# Patient Record
Sex: FEMALE | Race: WHITE | NOT HISPANIC OR LATINO | Employment: PART TIME | URBAN - METROPOLITAN AREA
[De-identification: names, ages, dates, MRNs, and addresses within clinical notes are randomized per-mention and may not be internally consistent; named-entity substitution may affect disease eponyms.]

---

## 2018-03-06 ENCOUNTER — OFFICE VISIT (OUTPATIENT)
Dept: DERMATOLOGY | Facility: IMAGING CENTER | Age: 43
End: 2018-03-06
Payer: COMMERCIAL

## 2018-03-06 VITALS — TEMPERATURE: 97.9 F | HEIGHT: 63 IN | BODY MASS INDEX: 23.92 KG/M2 | WEIGHT: 135 LBS

## 2018-03-06 DIAGNOSIS — L21.9 SEBORRHEIC DERMATITIS: ICD-10-CM

## 2018-03-06 DIAGNOSIS — L70.0 ACNE VULGARIS: ICD-10-CM

## 2018-03-06 PROCEDURE — 99203 OFFICE O/P NEW LOW 30 MIN: CPT | Performed by: DERMATOLOGY

## 2018-03-06 RX ORDER — SPIRONOLACTONE 50 MG/1
TABLET, FILM COATED ORAL
Qty: 180 TAB | Refills: 1 | Status: SHIPPED | OUTPATIENT
Start: 2018-03-06 | End: 2018-09-09

## 2018-03-06 RX ORDER — TAZAROTENE 1 MG/G
CREAM TOPICAL NIGHTLY
COMMUNITY
End: 2019-06-06

## 2018-03-06 ASSESSMENT — ENCOUNTER SYMPTOMS
FEVER: 0
CHILLS: 0

## 2018-03-06 NOTE — PROGRESS NOTES
Dermatology New Patient Visit    Chief Complaint   Patient presents with   • Acne       Subjective:     HPI:   Evelyn Mullins is a 43 y.o. female presenting for    Acne   Has been an issue on and off since youth  Just finished 1 month of doxycycline, so face is doing very well right now, but states has been on multiple courses of antibiotics since teen years  Has flare-ups ~ every other week  Does not flare with menstrual cycles   Prior treatments: doxycycline , Tazorac , clindamycin cream, accutane, light therapy  No family h/o breast/gynecologic cancers    Notes rash around the nose  Red, itchy  Started summer 2017  No treatments -- seemed to improve on its own a month or so ago    History of skin cancer: No  History of biopsies:No  History of blistering/severe sunburns:Yes, Details:  Teenager   Family history of skin cancer:No  Family history of atypical moles:No        No past medical history on file.    Current Outpatient Prescriptions on File Prior to Visit   Medication Sig Dispense Refill   • Cetirizine HCl (ZYRTEC ALLERGY PO) Take  by mouth.     • levothyroxine (SYNTHROID) 150 MCG Tab Take 150 mcg by mouth Every morning on an empty stomach.     • clindamycin (CLEOCIN T) 1 % Gel Apply  to affected area(s) 2 times a day.       No current facility-administered medications on file prior to visit.        Allergies   Allergen Reactions   • Pcn [Penicillins] Anaphylaxis       No family history on file.    Social History     Social History   • Marital status: Single     Spouse name: N/A   • Number of children: N/A   • Years of education: N/A     Occupational History   • Not on file.     Social History Main Topics   • Smoking status: Not on file   • Smokeless tobacco: Not on file   • Alcohol use Not on file   • Drug use: Unknown   • Sexual activity: Not on file     Other Topics Concern   • Not on file     Social History Narrative   • No narrative on file       Review of Systems   Constitutional: Negative for chills  "and fever.   Skin: Negative for itching and rash.   All other systems reviewed and are negative.       Objective:     A focused cutaneous exam was completed including: scalp, hair, ears, face, eyelids, conjunctiva, lips, neck, upper chest with the following pertinent findings listed below. Remaining above-listed examined areas within normal limits / negative for rashes or lesions.    Temperature 36.6 °C (97.9 °F), height 1.6 m (5' 3\"), weight 61.2 kg (135 lb).    Physical Exam   Constitutional: She is oriented to person, place, and time and well-developed, well-nourished, and in no distress.   HENT:   Head: Normocephalic and atraumatic.       Eyes: Conjunctivae and lids are normal.   Neck: Normal range of motion.   Pulmonary/Chest: Effort normal.   Neurological: She is alert and oriented to person, place, and time.   Skin: Skin is warm and dry.   Psychiatric: Mood and affect normal.   Vitals reviewed.      DATA: none applicable to review    Assessment and Plan:     1. Acne vulgaris, inflammatory , moderate  - educated patient about diagnosis, management options, and expectations of treatment  - given patient has been on multiple different antibiotics, light therapy, as well as accuane during youth, we discussed the option of spironolactone for treatment, which patient would like to try  - Start spironolactone 50mg qhs for 2 weeks; if tolerating well w/o side effects, increase dose to 100mg qhs   Discussed side effects, possible risks, that it is not FDA approved for acne treatment. Patient needs to be on birth control if taking this medication (patient's partner has had a vasectomy -- I told her if partner ever changes, she needs to inform me). She is aware of the teratogenicity risk with this medication. Discussed the reported associated inc risk of gynecologic CA in the literature, black box warning of increased risk of tumor growth. Increased breast tenderness, irregular menstrual bleeding, dizziness, theoretical " increase in K+, agranulocytosis, discussed    2. Seborrheic dermatitis  - educated patient about diagnosis, management options, and expectations of treatment  - instructed to start hydrocortisone 1% cream twice daily to the affected area on the face prn  - side effects of topical steroids discussed, including skin thinning, appearance of stretch marks (striae), easy bruising, telangiectasias, and possible increased hair growth   - instructed to start head & shoulders shampoo (or other pyrithione zinc vs selenium sulfide containing shampoo) 2-3 times/week; to leave on the scalp for at least 5-10 minutes prior to rinsing.    Followup: Return in about 3 months (around 6/6/2018) for f/u acne.    Amirah Rodriguez M.D.

## 2018-06-12 ENCOUNTER — HOSPITAL ENCOUNTER (OUTPATIENT)
Dept: LAB | Facility: MEDICAL CENTER | Age: 43
End: 2018-06-12
Attending: DERMATOLOGY
Payer: COMMERCIAL

## 2018-06-12 ENCOUNTER — OFFICE VISIT (OUTPATIENT)
Dept: DERMATOLOGY | Facility: IMAGING CENTER | Age: 43
End: 2018-06-12
Payer: COMMERCIAL

## 2018-06-12 DIAGNOSIS — Z51.81 MEDICATION MONITORING ENCOUNTER: ICD-10-CM

## 2018-06-12 DIAGNOSIS — L70.0 ACNE VULGARIS: ICD-10-CM

## 2018-06-12 LAB — POTASSIUM SERPL-SCNC: 4 MMOL/L (ref 3.6–5.5)

## 2018-06-12 PROCEDURE — 84132 ASSAY OF SERUM POTASSIUM: CPT

## 2018-06-12 PROCEDURE — 99213 OFFICE O/P EST LOW 20 MIN: CPT | Performed by: DERMATOLOGY

## 2018-06-12 PROCEDURE — 36415 COLL VENOUS BLD VENIPUNCTURE: CPT

## 2018-06-12 ASSESSMENT — ENCOUNTER SYMPTOMS
CHILLS: 0
FEVER: 0

## 2018-06-12 NOTE — PROGRESS NOTES
Dermatology Return Patient Visit    Chief Complaint   Patient presents with   • Acne       Subjective:     HPI:   Evelyn Mullins is a 43 y.o. female presenting for    Follow up, acne  Much improved  Has not had a break out for several weeks, just 1 new spot on the chin over the past 2-3 days  Taking spironolactone 100mg qhs - tolerating w/o any side effects  Using tazorac topically from old sample as well  Does not like benzoyl peroxide gel (2/2 bleaching side effect)    Initial hx:  Has been an issue on and off since youth  Has been on multiple courses of antibiotics since teen years  Has flare-ups ~ every other week  Does not flare with menstrual cycles   Prior treatments: doxycycline , Tazorac , clindamycin cream, accutane, light therapy  No family h/o breast/gynecologic cancers    Seb derm - perinasal area  Improved  hc 1% cream prn  Red, itchy when active  Started summer 2017    History of skin cancer: No  History of biopsies:No  History of blistering/severe sunburns:Yes, Details:  Teenager   Family history of skin cancer:No  Family history of atypical moles:No        No past medical history on file.    Current Outpatient Prescriptions on File Prior to Visit   Medication Sig Dispense Refill   • spironolactone (ALDACTONE) 50 MG Tab 1 tablet by mouth nightly x 2 weeks, then increased to 2 tablets nightly 180 Tab 1   • tazorotene (TAZORAC) 0.1 % cream Apply  to affected area(s) every evening.     • Cetirizine HCl (ZYRTEC ALLERGY PO) Take  by mouth.     • clindamycin (CLEOCIN T) 1 % Gel Apply  to affected area(s) 2 times a day.     • levothyroxine (SYNTHROID) 150 MCG Tab Take 150 mcg by mouth Every morning on an empty stomach.       No current facility-administered medications on file prior to visit.        Allergies   Allergen Reactions   • Pcn [Penicillins] Anaphylaxis       No family history on file.    Social History     Social History   • Marital status: Single     Spouse name: N/A   • Number of children: N/A    • Years of education: N/A     Occupational History   • Not on file.     Social History Main Topics   • Smoking status: Not on file   • Smokeless tobacco: Not on file   • Alcohol use Not on file   • Drug use: Unknown   • Sexual activity: Not on file     Other Topics Concern   • Not on file     Social History Narrative   • No narrative on file       Review of Systems   Constitutional: Negative for chills and fever.   Skin: Negative for itching and rash.   All other systems reviewed and are negative.       Objective:     A focused cutaneous exam was completed including: scalp, hair, ears, face, eyelids, conjunctiva, lips, neck, upper chest with the following pertinent findings listed below. Remaining above-listed examined areas within normal limits / negative for rashes or lesions.    There were no vitals taken for this visit.    Physical Exam   Constitutional: She is oriented to person, place, and time and well-developed, well-nourished, and in no distress.   HENT:   Head: Normocephalic and atraumatic.       Eyes: Conjunctivae and lids are normal.   Neck: Normal range of motion.   Pulmonary/Chest: Effort normal.   Neurological: She is alert and oriented to person, place, and time.   Skin: Skin is warm and dry.   Psychiatric: Mood and affect normal.       DATA: none applicable to review    Assessment and Plan:     1. Acne vulgaris, inflammatory , moderate - imrpoved  - continue spironolactone 100mg qhs  - check K*  - re-discussed side effects, possible risks, that it is not FDA approved for acne treatment. Patient needs to be on birth control if taking this medication (patient's partner has had a vasectomy -- I told her if partner ever changes, she needs to inform me). She is aware of the teratogenicity risk with this medication. Discussed the reported associated inc risk of gynecologic CA in the literature, black box warning of increased risk of tumor growth. Increased breast tenderness, irregular menstrual  bleeding, dizziness, theoretical increase in K+, agranulocytosis, discussed  - recommended OTC La Roche Posay Effaclar Duo vs Differin -- will add RX strength in the future if not controlling (patient states will likely not get BP gel 2/2 her problems with bleaching    2. Seborrheic dermatitis - improved  - hydrocortisone 1% cream twice daily to the affected area on the face prn  - side effects of topical steroids discussed, including skin thinning, appearance of stretch marks (striae), easy bruising, telangiectasias, and possible increased hair growth   - recommended head & shoulders shampoo (or other pyrithione zinc vs selenium sulfide containing shampoo) 2-3 times/week; to leave on the scalp for at least 5-10 minutes prior to rinsing.    Followup: Return in about 6 months (around 12/12/2018) for f/u acne.    Amirah Rodriguez M.D.

## 2018-06-13 ENCOUNTER — TELEPHONE (OUTPATIENT)
Dept: DERMATOLOGY | Facility: IMAGING CENTER | Age: 43
End: 2018-06-13

## 2018-06-13 NOTE — TELEPHONE ENCOUNTER
----- Message from Amirah oRdriguez M.D. sent at 6/13/2018  6:27 AM PDT -----  [facility],     Can one of you notify this patient via letter of benign pathology results please?     Thank you!

## 2018-09-09 RX ORDER — SPIRONOLACTONE 100 MG/1
TABLET, FILM COATED ORAL
Qty: 90 TAB | Refills: 1 | Status: SHIPPED | OUTPATIENT
Start: 2018-09-09

## 2018-12-17 ENCOUNTER — HOSPITAL ENCOUNTER (OUTPATIENT)
Dept: RADIOLOGY | Facility: MEDICAL CENTER | Age: 43
End: 2018-12-17
Attending: FAMILY MEDICINE
Payer: COMMERCIAL

## 2018-12-17 DIAGNOSIS — Z12.31 VISIT FOR SCREENING MAMMOGRAM: ICD-10-CM

## 2018-12-17 PROCEDURE — 77067 SCR MAMMO BI INCL CAD: CPT

## 2019-01-24 ENCOUNTER — OFFICE VISIT (OUTPATIENT)
Dept: DERMATOLOGY | Facility: IMAGING CENTER | Age: 44
End: 2019-01-24
Payer: COMMERCIAL

## 2019-01-24 DIAGNOSIS — L70.0 ACNE VULGARIS: ICD-10-CM

## 2019-01-24 PROCEDURE — 99213 OFFICE O/P EST LOW 20 MIN: CPT | Performed by: DERMATOLOGY

## 2019-01-24 RX ORDER — TRETINOIN 0.8 MG/G
1 GEL TOPICAL
Qty: 50 G | Refills: 3 | Status: SHIPPED | OUTPATIENT
Start: 2019-01-24 | End: 2023-06-19

## 2019-01-24 RX ORDER — DAPSONE 75 MG/G
1 GEL TOPICAL DAILY
Qty: 30 G | Refills: 3 | Status: SHIPPED | OUTPATIENT
Start: 2019-01-24 | End: 2021-06-07

## 2019-01-24 ASSESSMENT — ENCOUNTER SYMPTOMS
CHILLS: 0
FEVER: 0

## 2019-01-24 NOTE — PROGRESS NOTES
Dermatology Return Patient Visit    Chief Complaint   Patient presents with   • Follow-Up       Subjective:     HPI:   Evelyn Mullins is a 43 y.o. female presenting for    Follow up acne   Not much improvement since last visit .  Has discontinued the Spirolactone - PCP discontinued 2/2 concern for cause of elevated LFTs - no change  using Tazorac cream daily  Her insurance will not cover the Tazorac , would like to discuss an alternative treatment  Currently active around the chin, eyebrows  Still flares ~monthly   Does not like benzoyl peroxide gel (2/2 bleaching side effect)    Initial hx:  Has been an issue on and off since youth  Has been on multiple courses of antibiotics since teen years  Has flare-ups ~ every other week  Does not flare with menstrual cycles   Prior treatments: doxycycline , Tazorac , clindamycin cream, accutane, light therapy  No family h/o breast/gynecologic cancers    Seb derm - under control, not discussed    History of skin cancer: No  History of biopsies:No  History of blistering/severe sunburns:Yes, Details:  Teenager   Family history of skin cancer:No  Family history of atypical moles:No        No past medical history on file.    Current Outpatient Prescriptions on File Prior to Visit   Medication Sig Dispense Refill   • spironolactone (ALDACTONE) 100 MG Tab TAKE ONE TABLET BY MOUTH NIGHTLY 90 Tab 1   • tazorotene (TAZORAC) 0.1 % cream Apply  to affected area(s) every evening.     • Cetirizine HCl (ZYRTEC ALLERGY PO) Take  by mouth.     • clindamycin (CLEOCIN T) 1 % Gel Apply  to affected area(s) 2 times a day.     • levothyroxine (SYNTHROID) 150 MCG Tab Take 150 mcg by mouth Every morning on an empty stomach.       No current facility-administered medications on file prior to visit.        Allergies   Allergen Reactions   • Pcn [Penicillins] Anaphylaxis     Review of Systems   Constitutional: Negative for chills and fever.   Skin: Negative for itching and rash.   All other systems  reviewed and are negative.       Objective:     A focused cutaneous exam was completed including: scalp, hair, ears, face, eyelids, conjunctiva, lips, neck, upper chest with the following pertinent findings listed below. Remaining above-listed examined areas within normal limits / negative for rashes or lesions.    There were no vitals taken for this visit.    Physical Exam   Constitutional: She is oriented to person, place, and time and well-developed, well-nourished, and in no distress.   HENT:   Head: Normocephalic and atraumatic.       Eyes: Conjunctivae and lids are normal.   Neck: Normal range of motion.   Pulmonary/Chest: Effort normal.   Neurological: She is alert and oriented to person, place, and time.   Skin: Skin is warm and dry.   Psychiatric: Mood and affect normal.       DATA: none applicable to review    Assessment and Plan:     1. Acne vulgaris, inflammatory ,mild/ moderate - flaring  - start dapsone 7.5% gel daily to affected area on the face  - Instructed to start retin-a 0.08% gel qhs. To use pea-sized amount on entire face; start 2-3 nights/week and titrate up as tolerated. S/e irritation discussed. Discussed cannot be used during pregnancy.  - sal acid wipes daily  - moisturizers discussed  - discussed importance of regular sun protection/sunscreen use, SPF 30 or greater with broad spectrum coverage, need for reapplication every  minutes    Followup: Return in about 3 months (around 4/24/2019).    Amirah Rodriguez M.D.

## 2019-04-25 ENCOUNTER — OFFICE VISIT (OUTPATIENT)
Dept: DERMATOLOGY | Facility: IMAGING CENTER | Age: 44
End: 2019-04-25
Payer: COMMERCIAL

## 2019-04-25 DIAGNOSIS — L71.0 PERIORIFICIAL DERMATITIS: ICD-10-CM

## 2019-04-25 PROCEDURE — 99213 OFFICE O/P EST LOW 20 MIN: CPT | Performed by: DERMATOLOGY

## 2019-04-25 RX ORDER — DOXYCYCLINE HYCLATE 100 MG
TABLET ORAL
Qty: 42 TAB | Refills: 0 | Status: SHIPPED | OUTPATIENT
Start: 2019-04-25 | End: 2019-09-23

## 2019-04-25 RX ORDER — PIMECROLIMUS 10 MG/G
1 CREAM TOPICAL 2 TIMES DAILY
Qty: 30 G | Refills: 3 | Status: SHIPPED | OUTPATIENT
Start: 2019-04-25 | End: 2019-04-28 | Stop reason: SDUPTHER

## 2019-04-25 ASSESSMENT — ENCOUNTER SYMPTOMS
CHILLS: 0
FEVER: 0

## 2019-04-25 NOTE — PROGRESS NOTES
Dermatology Return Patient Visit    Chief Complaint   Patient presents with   • Rash       Subjective:     HPI:   Evelyn Mullins is a 43 y.o. female presenting for    HPI: rash around eyes and nose   Has tried  Head and shoulders on scalp. States it was discussed at the last visit - did not help     Onset: 1 month ago  Aggravating factors: none   Alleviating factors: none  Itchy, red, bumpy  New creams/topicals: none  New medications (up to last 6 months): no  New travel: no  Other exposures: no  Treatments: none besides above  Last time this happened was summer 2017 - lasted awhile, then improved (no treatments), has not really flared since then  Stress is up in her life right now, which she wonders if this contributes    Acne doing well with dapsone / retin-a  Could not use tazorac 2/2 price  Does not like benzoyl peroxide gel (2/2 bleaching side effect)  Initial hx:  Has been an issue on and off since youth  Has been on multiple courses of antibiotics since teen years  Has flare-ups ~ every other week  Does not flare with menstrual cycles   Prior treatments: doxycycline , Tazorac , clindamycin cream, accutane, light therapy  No family h/o breast/gynecologic cancers    Seb derm - under control, not discussed    History of skin cancer: No  History of biopsies:No  History of blistering/severe sunburns:Yes, Details:  Teenager   Family history of skin cancer:No  Family history of atypical moles:No        No past medical history on file.    Current Outpatient Prescriptions on File Prior to Visit   Medication Sig Dispense Refill   • Dapsone 7.5 % Gel 1 Application by Apply externally route every day. 30 g 3   • Tretinoin Microsphere (RETIN-A MICRO PUMP) 0.08 % Gel 1 Application by Apply externally route every bedtime. 50 g 3   • spironolactone (ALDACTONE) 100 MG Tab TAKE ONE TABLET BY MOUTH NIGHTLY 90 Tab 1   • tazorotene (TAZORAC) 0.1 % cream Apply  to affected area(s) every evening.     • Cetirizine HCl (ZYRTEC  ALLERGY PO) Take  by mouth.     • levothyroxine (SYNTHROID) 150 MCG Tab Take 150 mcg by mouth Every morning on an empty stomach.       No current facility-administered medications on file prior to visit.        Allergies   Allergen Reactions   • Pcn [Penicillins] Anaphylaxis     Review of Systems   Constitutional: Negative for chills and fever.   Skin: Positive for itching and rash.   All other systems reviewed and are negative.       Objective:     A focused cutaneous exam was completed including: scalp, hair, ears, face, eyelids, conjunctiva, lips, neck, upper chest with the following pertinent findings listed below. Remaining above-listed examined areas within normal limits / negative for rashes or lesions.    There were no vitals taken for this visit.    Physical Exam   Constitutional: She is oriented to person, place, and time and well-developed, well-nourished, and in no distress.   HENT:   Head: Normocephalic and atraumatic.       Eyes: Conjunctivae and lids are normal.   Neck: Normal range of motion.   Pulmonary/Chest: Effort normal.   Neurological: She is alert and oriented to person, place, and time.   Skin: Skin is warm and dry.   Psychiatric: Mood and affect normal.       DATA: none applicable to review    Assessment and Plan:     1. Periorificial dermatitis  - start doxycycline 100mg BID x 2 weeks, then daily x 2 weeks. Instructed to take with food & water. Side effects including, but not limited to, GI upset, photosensitivity (and need for photoprotection) discussed.   - start Elidel 1% cream BID to the face. S/e burning discussed. Black box warning of cancer discussed  - discussed importance of regular sun protection/sunscreen use, SPF 30 or greater with broad spectrum coverage, need for reapplication every  minutes    Acne not discussed    Followup: Return in about 6 weeks (around 6/6/2019).    Amirah Rodriguez M.D.

## 2019-04-26 ENCOUNTER — TELEPHONE (OUTPATIENT)
Dept: DERMATOLOGY | Facility: IMAGING CENTER | Age: 44
End: 2019-04-26

## 2019-04-26 NOTE — TELEPHONE ENCOUNTER
Pt left message about Peter and the [facility] pharmacy, I called her back, no answer, left message for her to let me exactly what is going on.

## 2019-04-26 NOTE — TELEPHONE ENCOUNTER
Pt called back, [facility] does not take her insurance and medication will be $100. While talking to pt I discovered a coupon for RX Access program for [facility] and told pt about it. She knows her insurance can go through [facility], but pt wants to do mail order [facility]. I put that pharamcy in her chart and let her know I will send a messge to Dr Rodriguez to send that Elidel Rx to the mail order pharmacy. I gave pt all the info on the coupon card and told her to call and get registered. I also told her she will need to find out if mail order [facility] uses this coupon. Pt understood.

## 2019-04-28 RX ORDER — PIMECROLIMUS 10 MG/G
1 CREAM TOPICAL 2 TIMES DAILY
Qty: 30 G | Refills: 3 | Status: SHIPPED | OUTPATIENT
Start: 2019-04-28 | End: 2021-06-07

## 2019-06-06 ENCOUNTER — OFFICE VISIT (OUTPATIENT)
Dept: DERMATOLOGY | Facility: IMAGING CENTER | Age: 44
End: 2019-06-06
Payer: COMMERCIAL

## 2019-06-06 DIAGNOSIS — L71.9 ACNE ROSACEA: ICD-10-CM

## 2019-06-06 PROCEDURE — 11901 INJECT SKIN LESIONS >7: CPT | Performed by: DERMATOLOGY

## 2019-06-06 PROCEDURE — 99213 OFFICE O/P EST LOW 20 MIN: CPT | Mod: 25 | Performed by: DERMATOLOGY

## 2019-06-06 RX ORDER — DOXYCYCLINE 40 MG/1
40 CAPSULE ORAL EVERY MORNING
Qty: 90 CAP | Refills: 1 | Status: SHIPPED | OUTPATIENT
Start: 2019-06-06 | End: 2019-09-23

## 2019-06-06 ASSESSMENT — ENCOUNTER SYMPTOMS
FEVER: 0
CHILLS: 0

## 2019-06-06 NOTE — PROGRESS NOTES
Dermatology Return Patient Visit    Chief Complaint   Patient presents with   • Rash       Subjective:     HPI:   Evelyn Mullins is a 43 y.o. female presenting for    Follow up Periorificial dermatitis,  Finished doxy (1-2 weeks ago) and using generic elidel, has cleared up.  States acne is acting up, cysts around the nose that don't heal.  One one nasal tip, one on crease of r ala - both present x 2-3 weeks  Red, painful  Tretinoin, dapsone being used daily - no improvement    History of skin cancer: No  History of biopsies:No  History of blistering/severe sunburns:Yes, Details:  Teenager   Family history of skin cancer:No  Family history of atypical moles:No      Rash   Pertinent negatives include no fever.       No past medical history on file.    Current Outpatient Prescriptions on File Prior to Visit   Medication Sig Dispense Refill   • pimecrolimus (ELIDEL) 1 % cream Apply 1 Application to affected area(s) 2 times a day. face 30 g 3   • Dapsone 7.5 % Gel 1 Application by Apply externally route every day. 30 g 3   • Tretinoin Microsphere (RETIN-A MICRO PUMP) 0.08 % Gel 1 Application by Apply externally route every bedtime. 50 g 3   • levothyroxine (SYNTHROID) 150 MCG Tab Take 150 mcg by mouth Every morning on an empty stomach.     • doxycycline (VIBRAMYCIN) 100 MG Tab 1 pill twice daily for 2 weeks, then daily x 2 weeks (Patient not taking: Reported on 6/6/2019) 42 Tab 0   • spironolactone (ALDACTONE) 100 MG Tab TAKE ONE TABLET BY MOUTH NIGHTLY (Patient not taking: Reported on 6/6/2019) 90 Tab 1   • tazorotene (TAZORAC) 0.1 % cream Apply  to affected area(s) every evening.     • Cetirizine HCl (ZYRTEC ALLERGY PO) Take  by mouth.       No current facility-administered medications on file prior to visit.        Allergies   Allergen Reactions   • Pcn [Penicillins] Anaphylaxis     Review of Systems   Constitutional: Negative for chills and fever.   Skin: Positive for itching and rash.   All other systems reviewed  and are negative.       Objective:     A focused cutaneous exam was completed including: scalp, hair, ears, face, eyelids, conjunctiva, lips, neck, upper chest with the following pertinent findings listed below. Remaining above-listed examined areas within normal limits / negative for rashes or lesions.    There were no vitals taken for this visit.    Physical Exam   Constitutional: She is oriented to person, place, and time and well-developed, well-nourished, and in no distress.   HENT:   Head: Normocephalic and atraumatic.       Eyes: Conjunctivae and lids are normal.   Neck: Normal range of motion.   Pulmonary/Chest: Effort normal.   Neurological: She is alert and oriented to person, place, and time.   Skin: Skin is warm and dry.   Psychiatric: Mood and affect normal.       DATA: none applicable to review    Assessment and Plan:     1. Acne rosacea  - start Oracea 40mg daily; take with food and water; s/e GI upset sun sensitivity discussed  - cont dapsone 7.5% gel daily to affected area on the face  - cont retin-a 0.08% gel qhs. To use pea-sized amount on entire face; s/e irritation discussed. Discussed cannot be used during pregnancy.  - discussed dietary influence - patient has already tried/failed recs  INTRALESIONAL KENALOG:  Injection 2/2 pain in active lesion on nose. Discussed risks and benefits of intralesional kenalog injections, including skin atrophy, discoloration, minimal risk of infection. Patient verbally agreed. ILK 2.5mg/cc x 0.1cc injected into each lesion on the nose. Patient tolerated procedure well, no complications. Aftercare discussed.     Followup: Return in about 3 months (around 9/6/2019), or if symptoms worsen or fail to improve.    Amirah Rodriguez M.D.

## 2019-09-23 ENCOUNTER — OFFICE VISIT (OUTPATIENT)
Dept: DERMATOLOGY | Facility: IMAGING CENTER | Age: 44
End: 2019-09-23
Payer: COMMERCIAL

## 2019-09-23 DIAGNOSIS — L71.0 PERIORAL DERMATITIS: ICD-10-CM

## 2019-09-23 PROCEDURE — 99212 OFFICE O/P EST SF 10 MIN: CPT | Performed by: DERMATOLOGY

## 2019-09-23 RX ORDER — DOXYCYCLINE HYCLATE 20 MG
20 TABLET ORAL 2 TIMES DAILY
Qty: 180 TAB | Refills: 1 | Status: SHIPPED | OUTPATIENT
Start: 2019-09-23 | End: 2019-11-04 | Stop reason: SDUPTHER

## 2019-09-23 ASSESSMENT — ENCOUNTER SYMPTOMS
FEVER: 0
CHILLS: 0

## 2019-09-23 NOTE — PROGRESS NOTES
Dermatology Return Patient Visit    Chief Complaint   Patient presents with   • Rash       Subjective:     HPI:   Evelyn Mullins is a 43 y.o. female presenting for    Follow up perioral dermatitis  Flaring around the nose   elidel x 2 weeks, not improving  Never got oracea - too expensive  Using dapsone on chin intermittently, holding off on retin-a 2/2 irritation  Still has a spot on the nasal tip - has been picking off the scab  Also with scab/non-healing spot on right cheek x 1+ month    Rash on the right foot  Was cycling in [place] x 6 weeks prior  Got a blistering eruption - itchy  Used antifungal cream - improved, but still scaly  No longer itchy    History of skin cancer: No  History of biopsies:No  History of blistering/severe sunburns:Yes, Details:  Teenager   Family history of skin cancer:No  Family history of atypical moles:No    Rash   Pertinent negatives include no fever.       No past medical history on file.    Current Outpatient Medications on File Prior to Visit   Medication Sig Dispense Refill   • doxycycline (ORACEA) 40 MG capsule Take 1 Cap by mouth every morning. 90 Cap 1   • pimecrolimus (ELIDEL) 1 % cream Apply 1 Application to affected area(s) 2 times a day. face 30 g 3   • doxycycline (VIBRAMYCIN) 100 MG Tab 1 pill twice daily for 2 weeks, then daily x 2 weeks (Patient not taking: Reported on 6/6/2019) 42 Tab 0   • Dapsone 7.5 % Gel 1 Application by Apply externally route every day. 30 g 3   • Tretinoin Microsphere (RETIN-A MICRO PUMP) 0.08 % Gel 1 Application by Apply externally route every bedtime. 50 g 3   • spironolactone (ALDACTONE) 100 MG Tab TAKE ONE TABLET BY MOUTH NIGHTLY (Patient not taking: Reported on 6/6/2019) 90 Tab 1   • Cetirizine HCl (ZYRTEC ALLERGY PO) Take  by mouth.     • levothyroxine (SYNTHROID) 150 MCG Tab Take 150 mcg by mouth Every morning on an empty stomach.       No current facility-administered medications on file prior to visit.        Allergies   Allergen  Reactions   • Pcn [Penicillins] Anaphylaxis     Review of Systems   Constitutional: Negative for chills and fever.   Skin: Positive for itching and rash.   All other systems reviewed and are negative.       Objective:     A focused cutaneous exam was completed including: scalp, hair, ears, face, eyelids, conjunctiva, lips, neck, upper chest with the following pertinent findings listed below. Remaining above-listed examined areas within normal limits / negative for rashes or lesions.    There were no vitals taken for this visit.    Physical Exam   Constitutional: She is oriented to person, place, and time and well-developed, well-nourished, and in no distress.   HENT:   Head: Normocephalic and atraumatic.       Eyes: Conjunctivae and lids are normal.   Neck: Normal range of motion.   Pulmonary/Chest: Effort normal.   Neurological: She is alert and oriented to person, place, and time.   Skin: Skin is warm and dry.        Psychiatric: Mood and affect normal.       DATA: none applicable to review    Assessment and Plan:     1. Perioral dermatitis  - start doxy 20mg BID; take with food and water; s/e GI upset sun sensitivity discussed  - hold retina/dapsone for now  - trial soolantra BID - if no improvement, elidel/clinda  - needs to stop picking/traumatizing cheek/nose, use aquaphor locally bid until healed  - if no improvement, BX (nose)  - discussed dietary influence - patient has already tried/failed recs    2. Foot dermatitis - improved (?bullous tinea vs eczematous)  - aquaphor Bid  - consider adding TAC if no improvement/more symptomatic    Followup: Return in about 6 weeks (around 11/4/2019).    Amirah Rodriguez M.D.

## 2019-11-04 ENCOUNTER — OFFICE VISIT (OUTPATIENT)
Dept: DERMATOLOGY | Facility: IMAGING CENTER | Age: 44
End: 2019-11-04
Payer: COMMERCIAL

## 2019-11-04 DIAGNOSIS — L70.0 ACNE VULGARIS: ICD-10-CM

## 2019-11-04 DIAGNOSIS — L71.0 PERIORAL DERMATITIS: ICD-10-CM

## 2019-11-04 PROCEDURE — 99213 OFFICE O/P EST LOW 20 MIN: CPT | Performed by: DERMATOLOGY

## 2019-11-04 RX ORDER — DOXYCYCLINE HYCLATE 20 MG
20 TABLET ORAL 2 TIMES DAILY
Qty: 60 TAB | Refills: 6 | Status: SHIPPED | OUTPATIENT
Start: 2019-11-04 | End: 2021-06-07

## 2019-11-04 ASSESSMENT — ENCOUNTER SYMPTOMS
FEVER: 0
CHILLS: 0

## 2019-11-05 NOTE — PROGRESS NOTES
Dermatology Return Patient Visit    Chief Complaint   Patient presents with   • Follow-Up   • Rash       Subjective:     HPI:   Evelyn Mullins is a 43 y.o. female presenting for    Follow up perioral dermatitis on face   Significant improvement around the nose  taking doxy 20mg BID  soolantra did not help    Acne - havign breakouts on the chin/lower face occ  Using retina micro 0.08%  No dapsone    Food dermatitis improved    History of skin cancer: No  History of biopsies:No  History of blistering/severe sunburns:Yes, Details:  Teenager   Family history of skin cancer:No  Family history of atypical moles:No    Rash   Pertinent negatives include no fever.       No past medical history on file.    Current Outpatient Medications on File Prior to Visit   Medication Sig Dispense Refill   • doxycycline (PERIOSTAT) 20 MG tablet Take 1 Tab by mouth 2 times a day. 180 Tab 1   • pimecrolimus (ELIDEL) 1 % cream Apply 1 Application to affected area(s) 2 times a day. face 30 g 3   • Dapsone 7.5 % Gel 1 Application by Apply externally route every day. 30 g 3   • Tretinoin Microsphere (RETIN-A MICRO PUMP) 0.08 % Gel 1 Application by Apply externally route every bedtime. 50 g 3   • spironolactone (ALDACTONE) 100 MG Tab TAKE ONE TABLET BY MOUTH NIGHTLY (Patient not taking: Reported on 6/6/2019) 90 Tab 1   • Cetirizine HCl (ZYRTEC ALLERGY PO) Take  by mouth.     • levothyroxine (SYNTHROID) 150 MCG Tab Take 150 mcg by mouth Every morning on an empty stomach.       No current facility-administered medications on file prior to visit.        Allergies   Allergen Reactions   • Pcn [Penicillins] Anaphylaxis     Review of Systems   Constitutional: Negative for chills and fever.   Skin: Positive for rash. Negative for itching.        Objective:     A focused cutaneous exam was completed including: scalp, hair, ears, face, eyelids, conjunctiva, lips, neck, upper chest with the following pertinent findings listed below. Remaining  above-listed examined areas within normal limits / negative for rashes or lesions.    There were no vitals taken for this visit.    Physical Exam   Constitutional: She is oriented to person, place, and time and well-developed, well-nourished, and in no distress.   HENT:   Head: Normocephalic and atraumatic.       Eyes: Conjunctivae and lids are normal.   Neck: Normal range of motion.   Pulmonary/Chest: Effort normal.   Neurological: She is alert and oriented to person, place, and time.   Skin: Skin is warm and dry.        Psychiatric: Mood and affect normal.       DATA: none applicable to review    Assessment and Plan:     1. Perioral dermatitis - improved, minimal activity  - cont doxy 20mg BID; take with food and water; s/e GI upset sun sensitivity discussed  - no picking/traumatizing cheek/nose lesions  - discussed dietary influence - patient has already tried/failed recs    2. Acne vulgaris  - restart dapsone 7.5% gel daily to affected area on the face  -cont retin-a 0.08% gel qhs. To use pea-sized amount on entire face; start 2-3 nights/week and titrate up as tolerated. S/e irritation discussed. Discussed cannot be used during pregnancy.  - discussed importance of regular sun protection/sunscreen use, SPF 30 or greater with broad spectrum coverage, need for reapplication every  minutes    Followup: Return in about 6 months (around 5/4/2020).    Amirah Rodriguez M.D.

## 2020-01-30 ENCOUNTER — HOSPITAL ENCOUNTER (OUTPATIENT)
Dept: RADIOLOGY | Facility: MEDICAL CENTER | Age: 45
End: 2020-01-30
Attending: FAMILY MEDICINE
Payer: COMMERCIAL

## 2020-01-30 DIAGNOSIS — Z12.39 BREAST SCREENING: ICD-10-CM

## 2020-01-30 PROCEDURE — 77067 SCR MAMMO BI INCL CAD: CPT

## 2020-02-04 ENCOUNTER — HOSPITAL ENCOUNTER (OUTPATIENT)
Dept: RADIOLOGY | Facility: MEDICAL CENTER | Age: 45
End: 2020-02-04
Attending: FAMILY MEDICINE
Payer: COMMERCIAL

## 2020-02-04 DIAGNOSIS — R92.8 ABNORMAL MAMMOGRAM: ICD-10-CM

## 2020-02-04 PROCEDURE — G0279 TOMOSYNTHESIS, MAMMO: HCPCS | Mod: RT

## 2020-04-22 ENCOUNTER — APPOINTMENT (RX ONLY)
Dept: URBAN - METROPOLITAN AREA CLINIC 4 | Facility: CLINIC | Age: 45
Setting detail: DERMATOLOGY
End: 2020-04-22

## 2020-04-22 DIAGNOSIS — L70.0 ACNE VULGARIS: ICD-10-CM | Status: INADEQUATELY CONTROLLED

## 2020-04-22 PROCEDURE — ? TREATMENT REGIMEN

## 2020-04-22 PROCEDURE — ? COUNSELING

## 2020-04-22 PROCEDURE — 99202 OFFICE O/P NEW SF 15 MIN: CPT

## 2020-04-22 PROCEDURE — ? PRESCRIPTION

## 2020-04-22 PROCEDURE — ? ORDER TESTS

## 2020-04-22 NOTE — PROCEDURE: ORDER TESTS
Billing Type: Third-Party Bill
Bill For Surgical Tray: no
Expected Date Of Service: 04/22/2020
Performing Laboratory: [id number]
Lab Facility: [id number]

## 2020-04-22 NOTE — PROCEDURE: COUNSELING
Topical Clindamycin Counseling: Patient counseled that this medication may cause skin irritation or allergic reactions.  In the event of skin irritation, the patient was advised to reduce the amount of the drug applied or use it less frequently.   The patient verbalized understanding of the proper use and possible adverse effects of clindamycin.  All of the patient's questions and concerns were addressed.
Minocycline Pregnancy And Lactation Text: This medication is Pregnancy Category D and not consider safe during pregnancy. It is also excreted in breast milk.
Spironolactone Pregnancy And Lactation Text: This medication can cause feminization of the male fetus and should be avoided during pregnancy. The active metabolite is also found in breast milk.
Include Pregnancy/Lactation Warning?: No
Doxycycline Counseling:  Patient counseled regarding possible photosensitivity and increased risk for sunburn.  Patient instructed to avoid sunlight, if possible.  When exposed to sunlight, patients should wear protective clothing, sunglasses, and sunscreen.  The patient was instructed to call the office immediately if the following severe adverse effects occur:  hearing changes, easy bruising/bleeding, severe headache, or vision changes.  The patient verbalized understanding of the proper use and possible adverse effects of doxycycline.  All of the patient's questions and concerns were addressed.
Bactrim Pregnancy And Lactation Text: This medication is Pregnancy Category D and is known to cause fetal risk.  It is also excreted in breast milk.
Birth Control Pills Counseling: Birth Control Pill Counseling: I discussed with the patient the potential side effects of OCPs including but not limited to increased risk of stroke, heart attack, thrombophlebitis, deep venous thrombosis, hepatic adenomas, breast changes, GI upset, headaches, and depression.  The patient verbalized understanding of the proper use and possible adverse effects of OCPs. All of the patient's questions and concerns were addressed.
Tetracycline Counseling: Patient counseled regarding possible photosensitivity and increased risk for sunburn.  Patient instructed to avoid sunlight, if possible.  When exposed to sunlight, patients should wear protective clothing, sunglasses, and sunscreen.  The patient was instructed to call the office immediately if the following severe adverse effects occur:  hearing changes, easy bruising/bleeding, severe headache, or vision changes.  The patient verbalized understanding of the proper use and possible adverse effects of tetracycline.  All of the patient's questions and concerns were addressed. Patient understands to avoid pregnancy while on therapy due to potential birth defects.
Isotretinoin Pregnancy And Lactation Text: This medication is Pregnancy Category X and is considered extremely dangerous during pregnancy. It is unknown if it is excreted in breast milk.
Topical Retinoid counseling:  Patient advised to apply a pea-sized amount only at bedtime and wait 30 minutes after washing their face before applying.  If too drying, patient may add a non-comedogenic moisturizer. The patient verbalized understanding of the proper use and possible adverse effects of retinoids.  All of the patient's questions and concerns were addressed.
Doxycycline Pregnancy And Lactation Text: This medication is Pregnancy Category D and not consider safe during pregnancy. It is also excreted in breast milk but is considered safe for shorter treatment courses.
Sarecycline Counseling: Patient advised regarding possible photosensitivity and discoloration of the teeth, skin, lips, tongue and gums.  Patient instructed to avoid sunlight, if possible.  When exposed to sunlight, patients should wear protective clothing, sunglasses, and sunscreen.  The patient was instructed to call the office immediately if the following severe adverse effects occur:  hearing changes, easy bruising/bleeding, severe headache, or vision changes.  The patient verbalized understanding of the proper use and possible adverse effects of sarecycline.  All of the patient's questions and concerns were addressed.
Detail Level: Zone
Azithromycin Counseling:  I discussed with the patient the risks of azithromycin including but not limited to GI upset, allergic reaction, drug rash, diarrhea, and yeast infections.
Topical Clindamycin Pregnancy And Lactation Text: This medication is Pregnancy Category B and is considered safe during pregnancy. It is unknown if it is excreted in breast milk.
High Dose Vitamin A Counseling: Side effects reviewed, pt to contact office should one occur.
Birth Control Pills Pregnancy And Lactation Text: This medication should be avoided if pregnant and for the first 30 days post-partum.
Topical Retinoid Pregnancy And Lactation Text: This medication is Pregnancy Category C. It is unknown if this medication is excreted in breast milk.
Erythromycin Counseling:  I discussed with the patient the risks of erythromycin including but not limited to GI upset, allergic reaction, drug rash, diarrhea, increase in liver enzymes, and yeast infections.
Tazorac Counseling:  Patient advised that medication is irritating and drying.  Patient may need to apply sparingly and wash off after an hour before eventually leaving it on overnight.  The patient verbalized understanding of the proper use and possible adverse effects of tazorac.  All of the patient's questions and concerns were addressed.
Topical Sulfur Applications Counseling: Topical Sulfur Counseling: Patient counseled that this medication may cause skin irritation or allergic reactions.  In the event of skin irritation, the patient was advised to reduce the amount of the drug applied or use it less frequently.   The patient verbalized understanding of the proper use and possible adverse effects of topical sulfur application.  All of the patient's questions and concerns were addressed.
Azithromycin Pregnancy And Lactation Text: This medication is considered safe during pregnancy and is also secreted in breast milk.
Topical Sulfur Applications Pregnancy And Lactation Text: This medication is Pregnancy Category C and has an unknown safety profile during pregnancy. It is unknown if this topical medication is excreted in breast milk.
High Dose Vitamin A Pregnancy And Lactation Text: High dose vitamin A therapy is contraindicated during pregnancy and breast feeding.
Bactrim Counseling:  I discussed with the patient the risks of sulfa antibiotics including but not limited to GI upset, allergic reaction, drug rash, diarrhea, dizziness, photosensitivity, and yeast infections.  Rarely, more serious reactions can occur including but not limited to aplastic anemia, agranulocytosis, methemoglobinemia, blood dyscrasias, liver or kidney failure, lung infiltrates or desquamative/blistering drug rashes.
Dapsone Counseling: I discussed with the patient the risks of dapsone including but not limited to hemolytic anemia, agranulocytosis, rashes, methemoglobinemia, kidney failure, peripheral neuropathy, headaches, GI upset, and liver toxicity.  Patients who start dapsone require monitoring including baseline LFTs and weekly CBCs for the first month, then every month thereafter.  The patient verbalized understanding of the proper use and possible adverse effects of dapsone.  All of the patient's questions and concerns were addressed.
Dapsone Pregnancy And Lactation Text: This medication is Pregnancy Category C and is not considered safe during pregnancy or breast feeding.
Spironolactone Counseling: Patient advised regarding risks of diarrhea, abdominal pain, hyperkalemia, birth defects (for female patients), liver toxicity and renal toxicity. The patient may need blood work to monitor liver and kidney function and potassium levels while on therapy. The patient verbalized understanding of the proper use and possible adverse effects of spironolactone.  All of the patient's questions and concerns were addressed.
Benzoyl Peroxide Counseling: Patient counseled that medicine may cause skin irritation and bleach clothing.  In the event of skin irritation, the patient was advised to reduce the amount of the drug applied or use it less frequently.   The patient verbalized understanding of the proper use and possible adverse effects of benzoyl peroxide.  All of the patient's questions and concerns were addressed.
Erythromycin Pregnancy And Lactation Text: This medication is Pregnancy Category B and is considered safe during pregnancy. It is also excreted in breast milk.
Isotretinoin Counseling: Patient should get monthly blood tests, not donate blood, not drive at night if vision affected, not share medication, and not undergo elective surgery for 6 months after tx completed. Side effects reviewed, pt to contact office should one occur.
Benzoyl Peroxide Pregnancy And Lactation Text: This medication is Pregnancy Category C. It is unknown if benzoyl peroxide is excreted in breast milk.
Minocycline Counseling: Patient advised regarding possible photosensitivity and discoloration of the teeth, skin, lips, tongue and gums.  Patient instructed to avoid sunlight, if possible.  When exposed to sunlight, patients should wear protective clothing, sunglasses, and sunscreen.  The patient was instructed to call the office immediately if the following severe adverse effects occur:  hearing changes, easy bruising/bleeding, severe headache, or vision changes.  The patient verbalized understanding of the proper use and possible adverse effects of minocycline.  All of the patient's questions and concerns were addressed.
Tazorac Pregnancy And Lactation Text: This medication is not safe during pregnancy. It is unknown if this medication is excreted in breast milk.

## 2020-04-22 NOTE — PROCEDURE: TREATMENT REGIMEN
Discontinue Regimen: doxycycline
Continue Regimen: retin-A micro QHS
Detail Level: Zone
Plan: will follow labs closely, if any changes, will d/c\\nnot good candidate for isotretinoin given undergoing liver w/u
Initiate Treatment: spironolactone

## 2020-09-11 ENCOUNTER — APPOINTMENT (RX ONLY)
Dept: URBAN - METROPOLITAN AREA CLINIC 4 | Facility: CLINIC | Age: 45
Setting detail: DERMATOLOGY
End: 2020-09-11

## 2020-09-11 DIAGNOSIS — L70.0 ACNE VULGARIS: ICD-10-CM | Status: RESOLVING

## 2020-09-11 DIAGNOSIS — B35.3 TINEA PEDIS: ICD-10-CM

## 2020-09-11 PROCEDURE — ? COUNSELING

## 2020-09-11 PROCEDURE — ? PRESCRIPTION

## 2020-09-11 PROCEDURE — 99213 OFFICE O/P EST LOW 20 MIN: CPT

## 2020-09-11 PROCEDURE — ? ADDITIONAL NOTES

## 2020-09-11 RX ORDER — CICLOPIROX OLAMINE 7.7 MG/G
1 CREAM TOPICAL BID
Qty: 1 | Refills: 3 | Status: ERX | COMMUNITY
Start: 2020-09-11

## 2020-09-11 RX ORDER — ECONAZOLE NITRATE 10 MG/G
1 CREAM TOPICAL BID
Qty: 1 | Refills: 3 | Status: ERX | COMMUNITY
Start: 2020-09-11

## 2020-09-11 RX ADMIN — ECONAZOLE NITRATE 1: 10 CREAM TOPICAL at 00:00

## 2020-09-11 RX ADMIN — CICLOPIROX OLAMINE 1: 7.7 CREAM TOPICAL at 00:00

## 2020-09-11 ASSESSMENT — LOCATION DETAILED DESCRIPTION DERM
LOCATION DETAILED: INFERIOR MID FOREHEAD
LOCATION DETAILED: RIGHT DORSAL FOOT

## 2020-09-11 ASSESSMENT — LOCATION SIMPLE DESCRIPTION DERM
LOCATION SIMPLE: INFERIOR FOREHEAD
LOCATION SIMPLE: RIGHT FOOT

## 2020-09-11 ASSESSMENT — LOCATION ZONE DERM
LOCATION ZONE: FEET
LOCATION ZONE: FACE

## 2020-09-11 NOTE — PROCEDURE: COUNSELING
Dapsone Pregnancy And Lactation Text: This medication is Pregnancy Category C and is not considered safe during pregnancy or breast feeding.
Azithromycin Counseling:  I discussed with the patient the risks of azithromycin including but not limited to GI upset, allergic reaction, drug rash, diarrhea, and yeast infections.
High Dose Vitamin A Counseling: Side effects reviewed, pt to contact office should one occur.
Doxycycline Pregnancy And Lactation Text: This medication is Pregnancy Category D and not consider safe during pregnancy. It is also excreted in breast milk but is considered safe for shorter treatment courses.
Minocycline Counseling: Patient advised regarding possible photosensitivity and discoloration of the teeth, skin, lips, tongue and gums.  Patient instructed to avoid sunlight, if possible.  When exposed to sunlight, patients should wear protective clothing, sunglasses, and sunscreen.  The patient was instructed to call the office immediately if the following severe adverse effects occur:  hearing changes, easy bruising/bleeding, severe headache, or vision changes.  The patient verbalized understanding of the proper use and possible adverse effects of minocycline.  All of the patient's questions and concerns were addressed.
Tetracycline Pregnancy And Lactation Text: This medication is Pregnancy Category D and not consider safe during pregnancy. It is also excreted in breast milk.
Topical Clindamycin Counseling: Patient counseled that this medication may cause skin irritation or allergic reactions.  In the event of skin irritation, the patient was advised to reduce the amount of the drug applied or use it less frequently.   The patient verbalized understanding of the proper use and possible adverse effects of clindamycin.  All of the patient's questions and concerns were addressed.
Use Enhanced Medication Counseling?: No
Benzoyl Peroxide Pregnancy And Lactation Text: This medication is Pregnancy Category C. It is unknown if benzoyl peroxide is excreted in breast milk.
Topical Sulfur Applications Counseling: Topical Sulfur Counseling: Patient counseled that this medication may cause skin irritation or allergic reactions.  In the event of skin irritation, the patient was advised to reduce the amount of the drug applied or use it less frequently.   The patient verbalized understanding of the proper use and possible adverse effects of topical sulfur application.  All of the patient's questions and concerns were addressed.
Bactrim Counseling:  I discussed with the patient the risks of sulfa antibiotics including but not limited to GI upset, allergic reaction, drug rash, diarrhea, dizziness, photosensitivity, and yeast infections.  Rarely, more serious reactions can occur including but not limited to aplastic anemia, agranulocytosis, methemoglobinemia, blood dyscrasias, liver or kidney failure, lung infiltrates or desquamative/blistering drug rashes.
Erythromycin Pregnancy And Lactation Text: This medication is Pregnancy Category B and is considered safe during pregnancy. It is also excreted in breast milk.
Sarecycline Counseling: Patient advised regarding possible photosensitivity and discoloration of the teeth, skin, lips, tongue and gums.  Patient instructed to avoid sunlight, if possible.  When exposed to sunlight, patients should wear protective clothing, sunglasses, and sunscreen.  The patient was instructed to call the office immediately if the following severe adverse effects occur:  hearing changes, easy bruising/bleeding, severe headache, or vision changes.  The patient verbalized understanding of the proper use and possible adverse effects of sarecycline.  All of the patient's questions and concerns were addressed.
Birth Control Pills Counseling: Birth Control Pill Counseling: I discussed with the patient the potential side effects of OCPs including but not limited to increased risk of stroke, heart attack, thrombophlebitis, deep venous thrombosis, hepatic adenomas, breast changes, GI upset, headaches, and depression.  The patient verbalized understanding of the proper use and possible adverse effects of OCPs. All of the patient's questions and concerns were addressed.
Detail Level: Zone
Topical Retinoid Pregnancy And Lactation Text: This medication is Pregnancy Category C. It is unknown if this medication is excreted in breast milk.
Spironolactone Counseling: Patient advised regarding risks of diarrhea, abdominal pain, hyperkalemia, birth defects (for female patients), liver toxicity and renal toxicity. The patient may need blood work to monitor liver and kidney function and potassium levels while on therapy. The patient verbalized understanding of the proper use and possible adverse effects of spironolactone.  All of the patient's questions and concerns were addressed.
Isotretinoin Pregnancy And Lactation Text: This medication is Pregnancy Category X and is considered extremely dangerous during pregnancy. It is unknown if it is excreted in breast milk.
Dapsone Counseling: I discussed with the patient the risks of dapsone including but not limited to hemolytic anemia, agranulocytosis, rashes, methemoglobinemia, kidney failure, peripheral neuropathy, headaches, GI upset, and liver toxicity.  Patients who start dapsone require monitoring including baseline LFTs and weekly CBCs for the first month, then every month thereafter.  The patient verbalized understanding of the proper use and possible adverse effects of dapsone.  All of the patient's questions and concerns were addressed.
Tazorac Pregnancy And Lactation Text: This medication is not safe during pregnancy. It is unknown if this medication is excreted in breast milk.
High Dose Vitamin A Pregnancy And Lactation Text: High dose vitamin A therapy is contraindicated during pregnancy and breast feeding.
Tetracycline Counseling: Patient counseled regarding possible photosensitivity and increased risk for sunburn.  Patient instructed to avoid sunlight, if possible.  When exposed to sunlight, patients should wear protective clothing, sunglasses, and sunscreen.  The patient was instructed to call the office immediately if the following severe adverse effects occur:  hearing changes, easy bruising/bleeding, severe headache, or vision changes.  The patient verbalized understanding of the proper use and possible adverse effects of tetracycline.  All of the patient's questions and concerns were addressed. Patient understands to avoid pregnancy while on therapy due to potential birth defects.
Topical Clindamycin Pregnancy And Lactation Text: This medication is Pregnancy Category B and is considered safe during pregnancy. It is unknown if it is excreted in breast milk.
Doxycycline Counseling:  Patient counseled regarding possible photosensitivity and increased risk for sunburn.  Patient instructed to avoid sunlight, if possible.  When exposed to sunlight, patients should wear protective clothing, sunglasses, and sunscreen.  The patient was instructed to call the office immediately if the following severe adverse effects occur:  hearing changes, easy bruising/bleeding, severe headache, or vision changes.  The patient verbalized understanding of the proper use and possible adverse effects of doxycycline.  All of the patient's questions and concerns were addressed.
Azithromycin Pregnancy And Lactation Text: This medication is considered safe during pregnancy and is also secreted in breast milk.
Erythromycin Counseling:  I discussed with the patient the risks of erythromycin including but not limited to GI upset, allergic reaction, drug rash, diarrhea, increase in liver enzymes, and yeast infections.
Benzoyl Peroxide Counseling: Patient counseled that medicine may cause skin irritation and bleach clothing.  In the event of skin irritation, the patient was advised to reduce the amount of the drug applied or use it less frequently.   The patient verbalized understanding of the proper use and possible adverse effects of benzoyl peroxide.  All of the patient's questions and concerns were addressed.
Topical Retinoid counseling:  Patient advised to apply a pea-sized amount only at bedtime and wait 30 minutes after washing their face before applying.  If too drying, patient may add a non-comedogenic moisturizer. The patient verbalized understanding of the proper use and possible adverse effects of retinoids.  All of the patient's questions and concerns were addressed.
Topical Sulfur Applications Pregnancy And Lactation Text: This medication is Pregnancy Category C and has an unknown safety profile during pregnancy. It is unknown if this topical medication is excreted in breast milk.
Bactrim Pregnancy And Lactation Text: This medication is Pregnancy Category D and is known to cause fetal risk.  It is also excreted in breast milk.
Birth Control Pills Pregnancy And Lactation Text: This medication should be avoided if pregnant and for the first 30 days post-partum.
Isotretinoin Counseling: Patient should get monthly blood tests, not donate blood, not drive at night if vision affected, not share medication, and not undergo elective surgery for 6 months after tx completed. Side effects reviewed, pt to contact office should one occur.
Tazorac Counseling:  Patient advised that medication is irritating and drying.  Patient may need to apply sparingly and wash off after an hour before eventually leaving it on overnight.  The patient verbalized understanding of the proper use and possible adverse effects of tazorac.  All of the patient's questions and concerns were addressed.
Spironolactone Pregnancy And Lactation Text: This medication can cause feminization of the male fetus and should be avoided during pregnancy. The active metabolite is also found in breast milk.

## 2020-09-11 NOTE — PROCEDURE: ADDITIONAL NOTES
Additional Notes: avoiding systemic given ongoing w/u for ?unknown elevated LFTs (so far labs, imaging unrevealing per GI - next step is BX)
Detail Level: Simple
Additional Notes: continue spironolactone 100mg QHS\\ncontinue retin-A qHS

## 2021-02-19 ENCOUNTER — RX ONLY (OUTPATIENT)
Age: 46
Setting detail: RX ONLY
End: 2021-02-19

## 2021-02-19 RX ORDER — SPIRONOLACTONE 50 MG/1
1 TABLET, FILM COATED ORAL BID
Qty: 180 | Refills: 3 | Status: ERX

## 2021-04-29 ENCOUNTER — RX ONLY (OUTPATIENT)
Age: 46
Setting detail: RX ONLY
End: 2021-04-29

## 2021-04-29 RX ORDER — TRETINOIN 0.8 MG/G
GEL TOPICAL
Qty: 1 | Refills: 0 | Status: ERX | COMMUNITY
Start: 2021-04-29

## 2021-06-07 PROBLEM — S42.025D: Status: ACTIVE | Noted: 2021-06-07

## 2022-02-03 ENCOUNTER — HOSPITAL ENCOUNTER (OUTPATIENT)
Dept: RADIOLOGY | Facility: MEDICAL CENTER | Age: 47
End: 2022-02-03
Attending: FAMILY MEDICINE
Payer: COMMERCIAL

## 2022-02-03 DIAGNOSIS — Z12.31 VISIT FOR SCREENING MAMMOGRAM: ICD-10-CM

## 2022-02-03 PROCEDURE — 77063 BREAST TOMOSYNTHESIS BI: CPT

## 2022-11-22 ENCOUNTER — APPOINTMENT (RX ONLY)
Dept: URBAN - METROPOLITAN AREA CLINIC 6 | Facility: CLINIC | Age: 47
Setting detail: DERMATOLOGY
End: 2022-11-22

## 2022-11-22 DIAGNOSIS — L70.0 ACNE VULGARIS: ICD-10-CM | Status: STABLE

## 2022-11-22 DIAGNOSIS — B35.3 TINEA PEDIS: ICD-10-CM | Status: STABLE

## 2022-11-22 PROCEDURE — ? ADDITIONAL NOTES

## 2022-11-22 PROCEDURE — 99213 OFFICE O/P EST LOW 20 MIN: CPT

## 2022-11-22 PROCEDURE — ? PRESCRIPTION MEDICATION MANAGEMENT

## 2022-11-22 PROCEDURE — ? TREATMENT REGIMEN

## 2022-11-22 PROCEDURE — ? PRESCRIPTION

## 2022-11-22 PROCEDURE — ? COUNSELING

## 2022-11-22 RX ORDER — SPIRONOLACTONE 50 MG/1
2 TABLET, FILM COATED ORAL QHS
Qty: 60 | Refills: 11 | Status: ERX | COMMUNITY
Start: 2022-11-22

## 2022-11-22 RX ADMIN — SPIRONOLACTONE 2: 50 TABLET, FILM COATED ORAL at 00:00

## 2022-11-22 ASSESSMENT — LOCATION SIMPLE DESCRIPTION DERM
LOCATION SIMPLE: INFERIOR FOREHEAD
LOCATION SIMPLE: RIGHT FOOT

## 2022-11-22 ASSESSMENT — LOCATION ZONE DERM
LOCATION ZONE: FACE
LOCATION ZONE: FEET

## 2022-11-22 ASSESSMENT — LOCATION DETAILED DESCRIPTION DERM
LOCATION DETAILED: RIGHT DORSAL FOOT
LOCATION DETAILED: INFERIOR MID FOREHEAD

## 2022-11-22 NOTE — PROCEDURE: TREATMENT REGIMEN
Plan: Discussed if worsens can RTC for reevaluation and potentially prescribe oral medication.
Detail Level: Zone

## 2022-11-22 NOTE — PROCEDURE: COUNSELING
Detail Level: Zone
Dapsone Pregnancy And Lactation Text: This medication is Pregnancy Category C and is not considered safe during pregnancy or breast feeding.
Azithromycin Counseling:  I discussed with the patient the risks of azithromycin including but not limited to GI upset, allergic reaction, drug rash, diarrhea, and yeast infections.
High Dose Vitamin A Counseling: Side effects reviewed, pt to contact office should one occur.
Doxycycline Pregnancy And Lactation Text: This medication is Pregnancy Category D and not consider safe during pregnancy. It is also excreted in breast milk but is considered safe for shorter treatment courses.
Minocycline Counseling: Patient advised regarding possible photosensitivity and discoloration of the teeth, skin, lips, tongue and gums.  Patient instructed to avoid sunlight, if possible.  When exposed to sunlight, patients should wear protective clothing, sunglasses, and sunscreen.  The patient was instructed to call the office immediately if the following severe adverse effects occur:  hearing changes, easy bruising/bleeding, severe headache, or vision changes.  The patient verbalized understanding of the proper use and possible adverse effects of minocycline.  All of the patient's questions and concerns were addressed.
Tetracycline Pregnancy And Lactation Text: This medication is Pregnancy Category D and not consider safe during pregnancy. It is also excreted in breast milk.
Topical Clindamycin Counseling: Patient counseled that this medication may cause skin irritation or allergic reactions.  In the event of skin irritation, the patient was advised to reduce the amount of the drug applied or use it less frequently.   The patient verbalized understanding of the proper use and possible adverse effects of clindamycin.  All of the patient's questions and concerns were addressed.
Use Enhanced Medication Counseling?: No
Benzoyl Peroxide Pregnancy And Lactation Text: This medication is Pregnancy Category C. It is unknown if benzoyl peroxide is excreted in breast milk.
Topical Sulfur Applications Counseling: Topical Sulfur Counseling: Patient counseled that this medication may cause skin irritation or allergic reactions.  In the event of skin irritation, the patient was advised to reduce the amount of the drug applied or use it less frequently.   The patient verbalized understanding of the proper use and possible adverse effects of topical sulfur application.  All of the patient's questions and concerns were addressed.
Bactrim Counseling:  I discussed with the patient the risks of sulfa antibiotics including but not limited to GI upset, allergic reaction, drug rash, diarrhea, dizziness, photosensitivity, and yeast infections.  Rarely, more serious reactions can occur including but not limited to aplastic anemia, agranulocytosis, methemoglobinemia, blood dyscrasias, liver or kidney failure, lung infiltrates or desquamative/blistering drug rashes.
Erythromycin Pregnancy And Lactation Text: This medication is Pregnancy Category B and is considered safe during pregnancy. It is also excreted in breast milk.
Sarecycline Counseling: Patient advised regarding possible photosensitivity and discoloration of the teeth, skin, lips, tongue and gums.  Patient instructed to avoid sunlight, if possible.  When exposed to sunlight, patients should wear protective clothing, sunglasses, and sunscreen.  The patient was instructed to call the office immediately if the following severe adverse effects occur:  hearing changes, easy bruising/bleeding, severe headache, or vision changes.  The patient verbalized understanding of the proper use and possible adverse effects of sarecycline.  All of the patient's questions and concerns were addressed.
Birth Control Pills Counseling: Birth Control Pill Counseling: I discussed with the patient the potential side effects of OCPs including but not limited to increased risk of stroke, heart attack, thrombophlebitis, deep venous thrombosis, hepatic adenomas, breast changes, GI upset, headaches, and depression.  The patient verbalized understanding of the proper use and possible adverse effects of OCPs. All of the patient's questions and concerns were addressed.
Topical Retinoid Pregnancy And Lactation Text: This medication is Pregnancy Category C. It is unknown if this medication is excreted in breast milk.
Spironolactone Counseling: Patient advised regarding risks of diarrhea, abdominal pain, hyperkalemia, birth defects (for female patients), liver toxicity and renal toxicity. The patient may need blood work to monitor liver and kidney function and potassium levels while on therapy. The patient verbalized understanding of the proper use and possible adverse effects of spironolactone.  All of the patient's questions and concerns were addressed.
Isotretinoin Pregnancy And Lactation Text: This medication is Pregnancy Category X and is considered extremely dangerous during pregnancy. It is unknown if it is excreted in breast milk.
Dapsone Counseling: I discussed with the patient the risks of dapsone including but not limited to hemolytic anemia, agranulocytosis, rashes, methemoglobinemia, kidney failure, peripheral neuropathy, headaches, GI upset, and liver toxicity.  Patients who start dapsone require monitoring including baseline LFTs and weekly CBCs for the first month, then every month thereafter.  The patient verbalized understanding of the proper use and possible adverse effects of dapsone.  All of the patient's questions and concerns were addressed.
Tazorac Pregnancy And Lactation Text: This medication is not safe during pregnancy. It is unknown if this medication is excreted in breast milk.
High Dose Vitamin A Pregnancy And Lactation Text: High dose vitamin A therapy is contraindicated during pregnancy and breast feeding.
Tetracycline Counseling: Patient counseled regarding possible photosensitivity and increased risk for sunburn.  Patient instructed to avoid sunlight, if possible.  When exposed to sunlight, patients should wear protective clothing, sunglasses, and sunscreen.  The patient was instructed to call the office immediately if the following severe adverse effects occur:  hearing changes, easy bruising/bleeding, severe headache, or vision changes.  The patient verbalized understanding of the proper use and possible adverse effects of tetracycline.  All of the patient's questions and concerns were addressed. Patient understands to avoid pregnancy while on therapy due to potential birth defects.
Topical Clindamycin Pregnancy And Lactation Text: This medication is Pregnancy Category B and is considered safe during pregnancy. It is unknown if it is excreted in breast milk.
Doxycycline Counseling:  Patient counseled regarding possible photosensitivity and increased risk for sunburn.  Patient instructed to avoid sunlight, if possible.  When exposed to sunlight, patients should wear protective clothing, sunglasses, and sunscreen.  The patient was instructed to call the office immediately if the following severe adverse effects occur:  hearing changes, easy bruising/bleeding, severe headache, or vision changes.  The patient verbalized understanding of the proper use and possible adverse effects of doxycycline.  All of the patient's questions and concerns were addressed.
Azithromycin Pregnancy And Lactation Text: This medication is considered safe during pregnancy and is also secreted in breast milk.
Erythromycin Counseling:  I discussed with the patient the risks of erythromycin including but not limited to GI upset, allergic reaction, drug rash, diarrhea, increase in liver enzymes, and yeast infections.
Benzoyl Peroxide Counseling: Patient counseled that medicine may cause skin irritation and bleach clothing.  In the event of skin irritation, the patient was advised to reduce the amount of the drug applied or use it less frequently.   The patient verbalized understanding of the proper use and possible adverse effects of benzoyl peroxide.  All of the patient's questions and concerns were addressed.
Topical Retinoid counseling:  Patient advised to apply a pea-sized amount only at bedtime and wait 30 minutes after washing their face before applying.  If too drying, patient may add a non-comedogenic moisturizer. The patient verbalized understanding of the proper use and possible adverse effects of retinoids.  All of the patient's questions and concerns were addressed.
Topical Sulfur Applications Pregnancy And Lactation Text: This medication is Pregnancy Category C and has an unknown safety profile during pregnancy. It is unknown if this topical medication is excreted in breast milk.
Bactrim Pregnancy And Lactation Text: This medication is Pregnancy Category D and is known to cause fetal risk.  It is also excreted in breast milk.
Birth Control Pills Pregnancy And Lactation Text: This medication should be avoided if pregnant and for the first 30 days post-partum.
Isotretinoin Counseling: Patient should get monthly blood tests, not donate blood, not drive at night if vision affected, not share medication, and not undergo elective surgery for 6 months after tx completed. Side effects reviewed, pt to contact office should one occur.
Tazorac Counseling:  Patient advised that medication is irritating and drying.  Patient may need to apply sparingly and wash off after an hour before eventually leaving it on overnight.  The patient verbalized understanding of the proper use and possible adverse effects of tazorac.  All of the patient's questions and concerns were addressed.
Spironolactone Pregnancy And Lactation Text: This medication can cause feminization of the male fetus and should be avoided during pregnancy. The active metabolite is also found in breast milk.

## 2022-11-22 NOTE — PROCEDURE: PRESCRIPTION MEDICATION MANAGEMENT
Plan: Advised patient can taper off medication if remains clear, RTC if flares/not well controlled with current RX
Detail Level: Zone
Render In Strict Bullet Format?: No
Continue Regimen: Spironolactone 100mg Qhs

## 2022-11-29 RX ORDER — SPIRONOLACTONE 50 MG/1
2 TABLET, FILM COATED ORAL QHS
Qty: 180 | Refills: 4 | Status: ERX

## 2023-02-06 ENCOUNTER — HOSPITAL ENCOUNTER (OUTPATIENT)
Dept: RADIOLOGY | Facility: MEDICAL CENTER | Age: 48
End: 2023-02-06
Attending: FAMILY MEDICINE
Payer: COMMERCIAL

## 2023-02-06 DIAGNOSIS — Z12.31 VISIT FOR SCREENING MAMMOGRAM: ICD-10-CM

## 2023-02-06 PROCEDURE — 77063 BREAST TOMOSYNTHESIS BI: CPT

## 2023-02-23 ENCOUNTER — HOSPITAL ENCOUNTER (OUTPATIENT)
Dept: RADIOLOGY | Facility: MEDICAL CENTER | Age: 48
End: 2023-02-23
Attending: FAMILY MEDICINE
Payer: COMMERCIAL

## 2023-02-23 DIAGNOSIS — R92.8 ABNORMAL MAMMOGRAM: ICD-10-CM

## 2023-02-23 PROCEDURE — 77065 DX MAMMO INCL CAD UNI: CPT | Mod: LT

## 2023-03-23 ENCOUNTER — APPOINTMENT (OUTPATIENT)
Dept: RADIOLOGY | Facility: MEDICAL CENTER | Age: 48
End: 2023-03-23
Attending: FAMILY MEDICINE
Payer: COMMERCIAL

## 2023-05-26 ENCOUNTER — HOSPITAL ENCOUNTER (OUTPATIENT)
Dept: RADIOLOGY | Facility: MEDICAL CENTER | Age: 48
End: 2023-05-26
Attending: FAMILY MEDICINE
Payer: COMMERCIAL

## 2023-05-26 DIAGNOSIS — R92.8 ABNORMAL FINDINGS ON DIAGNOSTIC IMAGING OF BREAST: ICD-10-CM

## 2023-05-26 LAB — PATHOLOGY CONSULT NOTE: NORMAL

## 2023-05-26 PROCEDURE — 88342 IMHCHEM/IMCYTCHM 1ST ANTB: CPT

## 2023-05-26 PROCEDURE — 88341 IMHCHEM/IMCYTCHM EA ADD ANTB: CPT

## 2023-05-26 PROCEDURE — 88360 TUMOR IMMUNOHISTOCHEM/MANUAL: CPT | Mod: 91

## 2023-05-26 PROCEDURE — 88305 TISSUE EXAM BY PATHOLOGIST: CPT

## 2023-05-26 PROCEDURE — 77065 DX MAMMO INCL CAD UNI: CPT | Mod: LT

## 2023-05-30 ENCOUNTER — TELEPHONE (OUTPATIENT)
Dept: RADIOLOGY | Facility: MEDICAL CENTER | Age: 48
End: 2023-05-30
Payer: COMMERCIAL

## 2023-05-31 ENCOUNTER — TELEPHONE (OUTPATIENT)
Dept: RADIOLOGY | Facility: MEDICAL CENTER | Age: 48
End: 2023-05-31
Payer: COMMERCIAL

## 2023-06-13 ENCOUNTER — PATIENT OUTREACH (OUTPATIENT)
Dept: ONCOLOGY | Facility: MEDICAL CENTER | Age: 48
End: 2023-06-13
Payer: COMMERCIAL

## 2023-06-13 ENCOUNTER — APPOINTMENT (OUTPATIENT)
Dept: ADMISSIONS | Facility: MEDICAL CENTER | Age: 48
End: 2023-06-13
Attending: SURGERY
Payer: COMMERCIAL

## 2023-06-13 DIAGNOSIS — Z65.8 PSYCHOSOCIAL DISTRESS: ICD-10-CM

## 2023-06-13 NOTE — PROGRESS NOTES
"  ONCOLOGY NURSE NAVIGATION (ONN) ASSESSMENT:  Patient attended the Newly Diagnosed Breast Cancer Class today.  She arrived alone to the class.  Patient reported a pre-education distress score of 8. Jenni Yin & Zohaib introduced ourselves, I shared my role and resources at [facility]. Patient received packet of information/flyers on resources.  MIRA Penny taught the class & together we answered questions as we went along.  Patient has seen Dr. Au and has surgery scheduled for 6/27/23.  She shared a new patient appointment with Dr. Morrisberg is pending after her surgery.  She shared Dr. Au discussed both chemotherapy and radiation therapy consultation visits.      The practical problems contributing to her distress were insurance/financial, work, dealing with partner, nervousness, disrupted eating/sleeping and nausea.  MIRA Yin explained I am here to provide support, education and guidance throughout her healthcare journey and to assist with any barriers to care.  Patient shared her understanding of the information provided, but admitted being overwhelmed.  Patient has an understanding of the plan of care and that she will have consultation visits with medical oncology and radiation oncology.   ONCIERRA reviewed possible barriers for care.  She shared \"my job is detail oriented and it's been difficult to focus\".   We discussed counseling and FMLA paperwork (continuous vs intermittent) MIRA reviewed the Oncology Support Services team member's roles.  Urgent OSW referral placed today.  Patient verbalized understanding of information provided.  Patient's support team is her significant other, Zev, family, friends &running/dietary coaches.  MIRA Yin spoke with Tiffanie, our registered dietitian, and she will reach out to the patient per the patient's request.  Tiffanie denied need for official referral.  MIRA Yin escorted patient to [facility] 2nd floor to view Same Day Surgery & 1st " floor to view pre-admit.  On ground floor MIRA Yin shared Radiation Oncology Department.  Patient's current questions have been answered & she is encouraged to call for any future assistance.           BARRIERS ASSESSMENT:    TRANSPORTATION:  Denies barriers   EMPLOYMENT:  Patient works part-time as a .  States her employer is supportive.  Shared some mental distraction at work since diagnosis.  FINANCIAL:  Denies barriers.  [facility]  SUPPORT SYSTEM:  Significant other, Zev.  Family, friends, running and dietary coaches.    PSYCHOLOGICAL: Patient visibly anxious/distressed.  Reviewed OSW role and benefit of counseling.  Referral placed for OSW today.  DST pre-education=8 , post-education=7.  COMMUNICATION:   No barriers.  FAMILY CARE:   No barriers.  SELF CARE:   Independent    INTERVENTION:    Class tailored for patient's specific plan of care.  Packet provided with ONN introduction letter & business card.  Multiple other informational flyers/calenders provided today.  Patient encouraged to [employer].

## 2023-06-15 ENCOUNTER — TELEPHONE (OUTPATIENT)
Dept: RADIATION ONCOLOGY | Facility: MEDICAL CENTER | Age: 48
End: 2023-06-15
Payer: COMMERCIAL

## 2023-06-15 NOTE — TELEPHONE ENCOUNTER
Nutrition Services:  Telephone Encounter  RD received a verbal referral from MIRA. RD called pt and spoke to her on the phone. Per pt, she only has 5 minutes to talk and prefers to talk with RD next week. We discussed a f/u phone call next Tuesday 6/20. RD provided RD contact info.     Please contact as needed.  [phone]

## 2023-06-16 ENCOUNTER — PATIENT OUTREACH (OUTPATIENT)
Dept: ONCOLOGY | Facility: MEDICAL CENTER | Age: 48
End: 2023-06-16
Payer: COMMERCIAL

## 2023-06-16 NOTE — PROGRESS NOTES
"On June 16, 2023, Oncology Social Worker Anabell Nunn contacted pt. via telephone to follow up on urgent referral from Oncology Nurse Navigation.  OSW Nunn introduced herself and could hear a lot of noise.  OSW Edouard asked if this was a good time to speak.  Pt. stated it was not and asked for OSOBDULIA Nunn to contact her on Monday.  OSW Edouard informed pt. she would be out of the office all next week and informed pt. she could connect the following week.  Pt. shared next week was better and stated \"have a great time off.\"  OSOBDULIA Nunn attempted to explain she would be out of the office for a professional conference.  OSOBDULIA Nunn informed pt. she would send her contact information and encouraged pt. to reach out following week if she was interested in resources and/or support.    "

## 2023-06-16 NOTE — PROGRESS NOTES
"Nurse Navigator placed call to patient to evaluate level of distress following class on 6/13/23.  Patient answered & difficult to hear due to loud background noises.  Patient stated she was \"busy\"  She requested we \"catchup on Monday\" as she was trying to \"start enjoying the weekend\".  NN confirmed I will call back on Monday.  Patient ended call.  "

## 2023-06-19 ENCOUNTER — PRE-ADMISSION TESTING (OUTPATIENT)
Dept: ADMISSIONS | Facility: MEDICAL CENTER | Age: 48
End: 2023-06-19
Attending: SURGERY
Payer: COMMERCIAL

## 2023-06-20 ENCOUNTER — PATIENT OUTREACH (OUTPATIENT)
Dept: ONCOLOGY | Facility: MEDICAL CENTER | Age: 48
End: 2023-06-20
Payer: COMMERCIAL

## 2023-06-20 ENCOUNTER — TELEPHONE (OUTPATIENT)
Dept: RADIATION ONCOLOGY | Facility: MEDICAL CENTER | Age: 48
End: 2023-06-20
Payer: COMMERCIAL

## 2023-06-20 ENCOUNTER — HOSPITAL ENCOUNTER (OUTPATIENT)
Dept: HEMATOLOGY ONCOLOGY | Facility: MEDICAL CENTER | Age: 48
End: 2023-06-20
Attending: INTERNAL MEDICINE
Payer: COMMERCIAL

## 2023-06-20 ENCOUNTER — PRE-ADMISSION TESTING (OUTPATIENT)
Dept: ADMISSIONS | Facility: MEDICAL CENTER | Age: 48
End: 2023-06-20
Attending: SURGERY
Payer: COMMERCIAL

## 2023-06-20 ENCOUNTER — DOCUMENTATION (OUTPATIENT)
Dept: RADIATION ONCOLOGY | Facility: MEDICAL CENTER | Age: 48
End: 2023-06-20

## 2023-06-20 VITALS
RESPIRATION RATE: 15 BRPM | DIASTOLIC BLOOD PRESSURE: 70 MMHG | SYSTOLIC BLOOD PRESSURE: 104 MMHG | OXYGEN SATURATION: 97 % | WEIGHT: 141.43 LBS | HEIGHT: 63 IN | HEART RATE: 80 BPM | TEMPERATURE: 99.2 F | BODY MASS INDEX: 25.06 KG/M2

## 2023-06-20 DIAGNOSIS — Z17.0 MALIGNANT NEOPLASM OF UPPER-OUTER QUADRANT OF LEFT BREAST IN FEMALE, ESTROGEN RECEPTOR POSITIVE (HCC): ICD-10-CM

## 2023-06-20 DIAGNOSIS — C50.412 MALIGNANT NEOPLASM OF UPPER-OUTER QUADRANT OF LEFT BREAST IN FEMALE, ESTROGEN RECEPTOR POSITIVE (HCC): ICD-10-CM

## 2023-06-20 DIAGNOSIS — Z01.812 PRE-OPERATIVE LABORATORY EXAMINATION: ICD-10-CM

## 2023-06-20 LAB
ANION GAP SERPL CALC-SCNC: 9 MMOL/L (ref 7–16)
BASOPHILS # BLD AUTO: 0.5 % (ref 0–1.8)
BASOPHILS # BLD: 0.05 K/UL (ref 0–0.12)
BUN SERPL-MCNC: 18 MG/DL (ref 8–22)
CALCIUM SERPL-MCNC: 9.7 MG/DL (ref 8.5–10.5)
CHLORIDE SERPL-SCNC: 102 MMOL/L (ref 96–112)
CO2 SERPL-SCNC: 25 MMOL/L (ref 20–33)
CREAT SERPL-MCNC: 0.96 MG/DL (ref 0.5–1.4)
EOSINOPHIL # BLD AUTO: 0.32 K/UL (ref 0–0.51)
EOSINOPHIL NFR BLD: 3.4 % (ref 0–6.9)
ERYTHROCYTE [DISTWIDTH] IN BLOOD BY AUTOMATED COUNT: 48.2 FL (ref 35.9–50)
GFR SERPLBLD CREATININE-BSD FMLA CKD-EPI: 72 ML/MIN/1.73 M 2
GLUCOSE SERPL-MCNC: 83 MG/DL (ref 65–99)
HCT VFR BLD AUTO: 38.4 % (ref 37–47)
HGB BLD-MCNC: 12.8 G/DL (ref 12–16)
IMM GRANULOCYTES # BLD AUTO: 0.02 K/UL (ref 0–0.11)
IMM GRANULOCYTES NFR BLD AUTO: 0.2 % (ref 0–0.9)
LYMPHOCYTES # BLD AUTO: 2.41 K/UL (ref 1–4.8)
LYMPHOCYTES NFR BLD: 25.4 % (ref 22–41)
MCH RBC QN AUTO: 33.7 PG (ref 27–33)
MCHC RBC AUTO-ENTMCNC: 33.3 G/DL (ref 32.2–35.5)
MCV RBC AUTO: 101.1 FL (ref 81.4–97.8)
MONOCYTES # BLD AUTO: 0.49 K/UL (ref 0–0.85)
MONOCYTES NFR BLD AUTO: 5.2 % (ref 0–13.4)
NEUTROPHILS # BLD AUTO: 6.21 K/UL (ref 1.82–7.42)
NEUTROPHILS NFR BLD: 65.3 % (ref 44–72)
NRBC # BLD AUTO: 0 K/UL
NRBC BLD-RTO: 0 /100 WBC (ref 0–0.2)
PLATELET # BLD AUTO: 295 K/UL (ref 164–446)
PMV BLD AUTO: 10.3 FL (ref 9–12.9)
POTASSIUM SERPL-SCNC: 3.9 MMOL/L (ref 3.6–5.5)
RBC # BLD AUTO: 3.8 M/UL (ref 4.2–5.4)
SODIUM SERPL-SCNC: 136 MMOL/L (ref 135–145)
WBC # BLD AUTO: 9.5 K/UL (ref 4.8–10.8)

## 2023-06-20 PROCEDURE — 99204 OFFICE O/P NEW MOD 45 MIN: CPT | Performed by: INTERNAL MEDICINE

## 2023-06-20 PROCEDURE — 80048 BASIC METABOLIC PNL TOTAL CA: CPT

## 2023-06-20 PROCEDURE — 99212 OFFICE O/P EST SF 10 MIN: CPT | Performed by: INTERNAL MEDICINE

## 2023-06-20 PROCEDURE — 85025 COMPLETE CBC W/AUTO DIFF WBC: CPT

## 2023-06-20 PROCEDURE — 36415 COLL VENOUS BLD VENIPUNCTURE: CPT

## 2023-06-20 RX ORDER — FLUTICASONE PROPIONATE 50 MCG
1 SPRAY, SUSPENSION (ML) NASAL DAILY
COMMUNITY

## 2023-06-20 ASSESSMENT — ENCOUNTER SYMPTOMS
NEUROLOGICAL NEGATIVE: 1
MUSCULOSKELETAL NEGATIVE: 1
EYES NEGATIVE: 1
CONSTITUTIONAL NEGATIVE: 1
CARDIOVASCULAR NEGATIVE: 1
GASTROINTESTINAL NEGATIVE: 1
RESPIRATORY NEGATIVE: 1
PSYCHIATRIC NEGATIVE: 1

## 2023-06-20 NOTE — PROGRESS NOTES
Nurse Navigator placed a call to patient after listening to a voice message left today at 0824.  No answer.  Navigator left a voice message requesting a call back at there first convenience and provided my contact information.    6/20/23 @ 1500.  Patient returned call to Navigator.  She is at [facility] & able to drop off her Advanced Directives paperwork to scan into her chart.  She entered Radiation Therapy jacque delacruz.  MIRA Yin copied her document and returned the original.  She shared she has spoken with the OSW and oncology dietitian.  Patient and her significant other's questions have been answered and she will call with any future questions or concerns.

## 2023-06-20 NOTE — ADDENDUM NOTE
Encounter addended by: Jose Miguel Fields, Med Ass't on: 6/20/2023 3:32 PM   Actions taken: Charge Capture section accepted

## 2023-06-20 NOTE — PROGRESS NOTES
Consult:  Hematology/Oncology      Referring Physician:  Cynthia Sanders M.D.  Primary Care:  Cynthia Sanders M.D.    Diagnosis: At least microinvasive ductal carcinoma    Chief Complaint: Newly diagnosed at least microinvasive ductal carcinoma    History of Presenting Illness:  Evelyn Mullins is a 48 y.o. premenopausal white female who had a routine mammogram on 2/23/2023 that showed determinate calcifications in the left breast BI-RADS 4A.  On 5/26/2023 she underwent a stereotactic biopsy of this area which showed DCIS with at least microinvasive ductal carcinoma, grade 2, ER positive greater than 90%, MA positive greater than 90%, Ki-67 less than 10%, HER2 indeterminate as there was not enough tissue to make a diagnosis.  She has seen Dr. Au in consultation and will undergo partial mastectomy and sentinel lymph node biopsy next week.  She is nulliparous with a somewhat irregular.  But no hot flashes.  No family history of breast cancer.  Genetic testing is pending.  She is extremely healthy other than hypothyroidism well controlled and runs ultramarathons.      Past Medical History:   Diagnosis Date    Bronchitis     Cancer (HCC) 05/30/23    High cholesterol     Technically high but not being treated    Urinary incontinence     Sneezing, jumping       Past Surgical History:   Procedure Laterality Date    NO PERTINENT PAST SURGICAL HISTORY         Social History     Tobacco Use    Smoking status: Never     Passive exposure: Never    Smokeless tobacco: Never   Vaping Use    Vaping Use: Never used   Substance Use Topics    Alcohol use: Not Currently    Drug use: Not Currently     Types: Inhaled     Comment: Tried marijuana a few times        History reviewed. No pertinent family history.    Allergies as of 06/20/2023 - Reviewed 06/20/2023   Allergen Reaction Noted    Pcn [penicillins] Anaphylaxis 08/06/2016    Shellfish allergy Swelling 06/19/2023         Current Outpatient Medications:     Omega-3  Fatty Acids (FISH OIL PO), Take 1,200 mg by mouth every day., Disp: , Rfl:     VITAMIN D PO, Take 2,000 Int'l Units by mouth every day., Disp: , Rfl:     MAGNESIUM PO, Take 400 mg by mouth every day., Disp: , Rfl:     Coenzyme Q10 (COQ10 PO), Take 100 mg by mouth every day., Disp: , Rfl:     Glutamine 500 MG Cap, Take 2,000 mg by mouth every day., Disp: , Rfl:     COLLAGEN PO, Take 10 mg by mouth every day., Disp: , Rfl:     multivitamin Tab, Take 1 Tablet by mouth every day., Disp: , Rfl:     spironolactone (ALDACTONE) 100 MG Tab, TAKE ONE TABLET BY MOUTH NIGHTLY (Patient taking differently: Take 100 mg by mouth every evening.), Disp: 90 Tab, Rfl: 1    Cetirizine HCl (ZYRTEC ALLERGY PO), Take 10 mg by mouth every day., Disp: , Rfl:     levothyroxine (SYNTHROID) 150 MCG Tab, Take 175 mcg by mouth every morning on an empty stomach. 5 days per week, 150 mg 2 days per week, Disp: , Rfl:     Review of Systems:  Review of Systems   Constitutional: Negative.    HENT: Negative.     Eyes: Negative.    Respiratory: Negative.     Cardiovascular: Negative.    Gastrointestinal: Negative.    Genitourinary: Negative.    Musculoskeletal: Negative.    Skin: Negative.    Neurological: Negative.    Endo/Heme/Allergies: Negative.    Psychiatric/Behavioral: Negative.            Physical Exam:  There were no vitals filed for this visit.    DESC; KARNOFSKY SCALE WITH ECOG EQUIVALENT: 100, Fully active, able to carry on all pre-disease performed without restriction (ECOG equivalent 0)    DISTRESS LEVEL: no acute distress    Physical Exam was not performed today      Labs:  Hospital Outpatient Visit on 05/26/2023   Component Date Value Ref Range Status    Pathology Request 05/26/2023 Sent to Histo   Final       Imaging:   All listed images below have been independently reviewed by me. I agree with the findings as summarized below:    SB-STEREOTACTIC BIOPSY LEFT INITIAL LESION    Addendum Date: 6/5/2023    Addendum dictated on 5/30/2023 by  Dr. Grace as follows: Biopsy results were discussed with the patient 5/30/2023 approximately 1545 hours. Pathologic result: FINAL DIAGNOSIS: A. Left breast, calcifications, posterior to nipple, slight superior, stereotactic biopsy: At least micro-invasive ductal carcinoma with associated DCIS. Provisional stGstrstastdstest:st st1st Tubule score: 3, Nuclear score: 2, Mitotic score: 1 Tumor Size: at least 2 mm (this limited core sample may not be reflective of the actual tumor size). Microcalcifications: Not identified Lymphovascular Invasion: Not identified Prognostic Studies: Estrogen receptors: Positive, moderate, > 90% Progesterone receptors: Positive, strong, > 90% Ki-67: < 10% tumor cells staining HER2: [pending will be attempted on block A1] Comment: This case has been reviewed by a second pathologist, Dr. Motley, who concurs with the diagnosis. The radiologic and pathologic results are concordant. Surgical consultation is recommended and that should be facilitated by her primary care physician Cynthia Sanders. She has no adverse symptoms referrable to the biopsy site.    Result Date: 6/5/2023 5/26/2023 12:39 PM HISTORY/REASON FOR EXAM:  Group of calcifications anterior superior left breast COMPARISON:  02/23/2023 TECHNIQUE/EXAM DESCRIPTION AND NUMBER OF VIEWS:  Left breast stereotactic-guided vacuum-assisted core biopsy. The procedure was discussed with the patient. Risks, benefits, and alternatives were discussed. Informed written consent was obtained. A timeout was performed. The calcifications was targeted from a CC approach. The skin was cleansed with Betadine. The overlying skin and underlying breast tissue were anesthetized with 2 mL of 1% lidocaine. A small skin incision was made. 10 mL 1% lidocaine with epinephrine was injected through the biopsy needle. 12 cores were obtained using a 9g Eviva Petite biopsy device. A Eviva hourglass clip marker was placed at biopsy site. Calcifications are noted within the specimen.  Hemostasis was obtained with direct pressure. There were no apparent complications. Following stereotactic biopsy the patient was sent to a dedicated mammography unit for mammogram of the left breast there is successful marker deployment. Some residual punctate calcifications are noted in the region.. FINDINGS:  The calcifications was targeted from a CC approach. The skin was cleansed with Betadine. The overlying skin and underlying breast tissue were anesthetized with 2 mL of 1% lidocaine. A small skin incision was made. 10 mL 1% lidocaine with epinephrine was injected through the biopsy needle. 12 cores were obtained using a 9g Eviva Petite biopsy device. A Eviva hourglass clip marker was placed at biopsy site. Calcifications are noted within the specimen. Hemostasis was obtained with direct pressure. There were no apparent complications. Following stereotactic biopsy the patient was sent to a dedicated mammography unit for mammogram of the left breast there is successful marker deployment. Some residual punctate calcifications are noted in the region..     1.  Successful stereotactic biopsy of group of calcifications superior left breast near 12:00 position. 2.  Stereotactically guided placement of biopsy marker at the biopsy site. 3.  Two-view procedure mammogram demonstrates successful marker clip deployment.        Pathology:      Assessment & Plan:    1.  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  She is scheduled for lumpectomy and sentinel lymph node biopsy    Plan: She will be a good candidate for whole breast radiation therapy and likely endocrine therapy with tamoxifen based on the final pathologic results.  The course of action was reviewed with her in detail and she understands and agrees.    Any questions and concerns raised by the  patient were answered to the best of my ability. Thank you for allowing me to participate in the care for this patient. Please feel free to contact me for any questions or concerns.     Dirk Womack M.D.

## 2023-06-20 NOTE — LETTER
[facility]   [address],   [address]  [place] [address]  Phone: [phone]  Fax: [phone]              Evelyn Mullins  1975    Encounter Date: 6/20/2023    Dirk Womack M.D.          PROGRESS NOTE:  No notes on file      Cynthia Sanders M.D.  [address]  [place] [address]  Via Fax: [phone]     Antionette Au M.D.  [address] [place] [address] [address]  [place] [address]  Via Fax: [phone]

## 2023-06-20 NOTE — TELEPHONE ENCOUNTER
Nutrition Services: Telephone Encounter     RD called for nutrition check-in and establish. Py did not answer, though RD able to leave voicemessage with contact info for callback.     Please contact [phone]

## 2023-06-20 NOTE — PROGRESS NOTES
"Nutrition Services: RD Consultation  Evelyn Mullins is a 48 y.o. female with diagnosis of left breast cancer per Dr. Au    RD called pt to assess current intake, appetite, and nutritional status.  Pt answers, confirms is scheduled for a lumpectomy on 6/27. States appetite has decreased relative to stress associated with diagnosis. Mentions activity level is high and has worked with nutrition coaches and sports dietitians before. Pt wondering what she should be eating relative to her diagnosis. States also has an appointment with Dr. Womack today. Pt did not express any further nutrition-related questions or concerns at this time.     Dietary intake pattern:  Describes diet as plant-forward;; denies lactose intolerance, does not eat eggplant and has allergy to shellfish:  Breakfast: oats with fruit and yogurt or avocado toast with eggs  Lunch: sandwich with meat and salad on the side, sometimes including brown rice or crackers for added carbohydrates  Dinner: fish or chicken or red meat, starch, and vegetable    Snacks: fruit with deli meat or fig bars, sometimes includes smoothie with a protein source (tofu/ whey protein powder), fruit and oats    Past Medical History:   Diagnosis Date    Bronchitis     Cancer (HCC) 05/30/23    High cholesterol     Technically high but not being treated    Urinary incontinence     Sneezing, jumping     Past Surgical History:   Procedure Laterality Date    NO PERTINENT PAST SURGICAL HISTORY       Biochemical/ Anthropometric Assessment:  Treatment Regimen: TBD, surgical intervention with Dr. Au   Pertinent Labs: reviewed lab work from 2018  Pertinent Meds: synthroid, fish oil, vitamin D, magnesium, coenzyme Q10, glutamine, collagen peptides, MVM   Wt Readings from Last 1 Encounters:   06/20/23 64.1 kg (141 lb 6.8 oz)      Ht Readings from Last 1 Encounters:   06/20/23 1.6 m (5' 3\")      BMI Readings from Last 1 Encounters:   06/20/23 25.05 kg/m²   BMI Classification: " Overweight   Nutrition-Focused Physical Exam: Unable t assess given telephone encounter     Weight History:  Wt Readings from Last 6 Encounters:   06/20/23 64.1 kg (141 lb 6.8 oz)   06/07/21 61.2 kg (135 lb)   03/06/18 61.2 kg (135 lb)   08/06/16 66.7 kg (147 lb)     Weight Change/Malnutrition Risk: limited wt hx for review. Does not present with malnutrition at this time in congruence with ASPEN criteria related to current information obtained.     Interventions:  Introduced self and discussed role of dietitian throughout treatment process   Supported current dietary intake as does align with AICR recommendations for healthy diet.   Discussed incorporating smoothie given decrease in appetite if eating remains challenging.   Discussed reviewing supplements with Dr. Womack and can review again with RD pending changes to POC following appointment.  RD to reach out following surgery to reassess POC and recommendations accordingly.     Pt reports understanding and was receptive to information provided.   RD provided contact information. Encouraged pt to reach out as questions/concerns arise.   RD available PRN   [phone]

## 2023-06-21 ENCOUNTER — NON-PROVIDER VISIT (OUTPATIENT)
Dept: GENETICS | Facility: MEDICAL CENTER | Age: 48
End: 2023-06-21
Payer: COMMERCIAL

## 2023-06-21 DIAGNOSIS — Z17.0 MALIGNANT NEOPLASM OF UPPER-OUTER QUADRANT OF LEFT BREAST IN FEMALE, ESTROGEN RECEPTOR POSITIVE (HCC): ICD-10-CM

## 2023-06-21 DIAGNOSIS — C50.412 MALIGNANT NEOPLASM OF UPPER-OUTER QUADRANT OF LEFT BREAST IN FEMALE, ESTROGEN RECEPTOR POSITIVE (HCC): ICD-10-CM

## 2023-06-21 NOTE — PROGRESS NOTES
Evelyn Mullins is a 48 y.o. female here for a non-provider visit for: Lab Draws  on 6/21/2023 at 9:24 AM    Procedure Performed: Venipuncture     Anatomical site: Left Antecubital Area (AC)    Equipment used: 25 butterfly    Labs drawn: [employer] (two lavender EDTA tubes)    Ordering Provider: [email or web address]     Driss By: Debra Benitez, Med Ass't

## 2023-06-27 ENCOUNTER — HOSPITAL ENCOUNTER (OUTPATIENT)
Facility: MEDICAL CENTER | Age: 48
End: 2023-06-27
Attending: SURGERY | Admitting: SURGERY
Payer: COMMERCIAL

## 2023-06-27 ENCOUNTER — APPOINTMENT (OUTPATIENT)
Dept: RADIOLOGY | Facility: MEDICAL CENTER | Age: 48
End: 2023-06-27
Attending: SURGERY
Payer: COMMERCIAL

## 2023-06-27 ENCOUNTER — ANESTHESIA EVENT (OUTPATIENT)
Dept: SURGERY | Facility: MEDICAL CENTER | Age: 48
End: 2023-06-27
Payer: COMMERCIAL

## 2023-06-27 ENCOUNTER — APPOINTMENT (OUTPATIENT)
Dept: HEMATOLOGY ONCOLOGY | Facility: MEDICAL CENTER | Age: 48
End: 2023-06-27
Payer: COMMERCIAL

## 2023-06-27 ENCOUNTER — ANESTHESIA (OUTPATIENT)
Dept: SURGERY | Facility: MEDICAL CENTER | Age: 48
End: 2023-06-27
Payer: COMMERCIAL

## 2023-06-27 VITALS
HEIGHT: 63 IN | DIASTOLIC BLOOD PRESSURE: 78 MMHG | HEART RATE: 53 BPM | RESPIRATION RATE: 13 BRPM | BODY MASS INDEX: 24.61 KG/M2 | TEMPERATURE: 97 F | OXYGEN SATURATION: 99 % | WEIGHT: 138.89 LBS | SYSTOLIC BLOOD PRESSURE: 118 MMHG

## 2023-06-27 DIAGNOSIS — G89.18 POSTOPERATIVE PAIN: ICD-10-CM

## 2023-06-27 DIAGNOSIS — Z17.0 MALIGNANT NEOPLASM OF OVERLAPPING SITES OF LEFT BREAST IN FEMALE, ESTROGEN RECEPTOR POSITIVE (HCC): ICD-10-CM

## 2023-06-27 DIAGNOSIS — C50.812 MALIGNANT NEOPLASM OF OVERLAPPING SITES OF LEFT BREAST IN FEMALE, ESTROGEN RECEPTOR POSITIVE (HCC): ICD-10-CM

## 2023-06-27 LAB
HCG UR QL: NEGATIVE
PATHOLOGY CONSULT NOTE: NORMAL

## 2023-06-27 PROCEDURE — 88307 TISSUE EXAM BY PATHOLOGIST: CPT

## 2023-06-27 PROCEDURE — 38792 RA TRACER ID OF SENTINL NODE: CPT | Mod: LT

## 2023-06-27 PROCEDURE — 700101 HCHG RX REV CODE 250: Performed by: STUDENT IN AN ORGANIZED HEALTH CARE EDUCATION/TRAINING PROGRAM

## 2023-06-27 PROCEDURE — 700101 HCHG RX REV CODE 250: Performed by: SURGERY

## 2023-06-27 PROCEDURE — 160035 HCHG PACU - 1ST 60 MINS PHASE I: Performed by: SURGERY

## 2023-06-27 PROCEDURE — 700102 HCHG RX REV CODE 250 W/ 637 OVERRIDE(OP): Performed by: SURGERY

## 2023-06-27 PROCEDURE — 81025 URINE PREGNANCY TEST: CPT

## 2023-06-27 PROCEDURE — A9270 NON-COVERED ITEM OR SERVICE: HCPCS | Performed by: SURGERY

## 2023-06-27 PROCEDURE — 700105 HCHG RX REV CODE 258: Performed by: SURGERY

## 2023-06-27 PROCEDURE — 160047 HCHG PACU  - EA ADDL 30 MINS PHASE II: Performed by: SURGERY

## 2023-06-27 PROCEDURE — 160046 HCHG PACU - 1ST 60 MINS PHASE II: Performed by: SURGERY

## 2023-06-27 PROCEDURE — 160002 HCHG RECOVERY MINUTES (STAT): Performed by: SURGERY

## 2023-06-27 PROCEDURE — 160025 RECOVERY II MINUTES (STATS): Performed by: SURGERY

## 2023-06-27 PROCEDURE — 01610 ANES ALL PX NRV MUSC SHO&AX: CPT | Performed by: STUDENT IN AN ORGANIZED HEALTH CARE EDUCATION/TRAINING PROGRAM

## 2023-06-27 PROCEDURE — 160041 HCHG SURGERY MINUTES - EA ADDL 1 MIN LEVEL 4: Performed by: SURGERY

## 2023-06-27 PROCEDURE — 700111 HCHG RX REV CODE 636 W/ 250 OVERRIDE (IP): Performed by: STUDENT IN AN ORGANIZED HEALTH CARE EDUCATION/TRAINING PROGRAM

## 2023-06-27 PROCEDURE — 160048 HCHG OR STATISTICAL LEVEL 1-5: Performed by: SURGERY

## 2023-06-27 PROCEDURE — 160009 HCHG ANES TIME/MIN: Performed by: SURGERY

## 2023-06-27 PROCEDURE — 19281 PERQ DEVICE BREAST 1ST IMAG: CPT | Mod: LT

## 2023-06-27 PROCEDURE — 700111 HCHG RX REV CODE 636 W/ 250 OVERRIDE (IP): Performed by: SURGERY

## 2023-06-27 PROCEDURE — 160029 HCHG SURGERY MINUTES - 1ST 30 MINS LEVEL 4: Performed by: SURGERY

## 2023-06-27 PROCEDURE — 88342 IMHCHEM/IMCYTCHM 1ST ANTB: CPT

## 2023-06-27 PROCEDURE — 76098 X-RAY EXAM SURGICAL SPECIMEN: CPT | Mod: LT

## 2023-06-27 RX ORDER — DIPHENHYDRAMINE HYDROCHLORIDE 50 MG/ML
12.5 INJECTION INTRAMUSCULAR; INTRAVENOUS
Status: DISCONTINUED | OUTPATIENT
Start: 2023-06-27 | End: 2023-06-27 | Stop reason: HOSPADM

## 2023-06-27 RX ORDER — ONDANSETRON 2 MG/ML
4 INJECTION INTRAMUSCULAR; INTRAVENOUS
Status: DISCONTINUED | OUTPATIENT
Start: 2023-06-27 | End: 2023-06-27 | Stop reason: HOSPADM

## 2023-06-27 RX ORDER — CEFAZOLIN SODIUM 1 G/3ML
INJECTION, POWDER, FOR SOLUTION INTRAMUSCULAR; INTRAVENOUS PRN
Status: DISCONTINUED | OUTPATIENT
Start: 2023-06-27 | End: 2023-06-27 | Stop reason: SURG

## 2023-06-27 RX ORDER — BUPIVACAINE HYDROCHLORIDE AND EPINEPHRINE 5; 5 MG/ML; UG/ML
INJECTION, SOLUTION EPIDURAL; INTRACAUDAL; PERINEURAL
Status: DISCONTINUED | OUTPATIENT
Start: 2023-06-27 | End: 2023-06-27 | Stop reason: HOSPADM

## 2023-06-27 RX ORDER — TRAMADOL HYDROCHLORIDE 50 MG/1
50 TABLET ORAL EVERY 6 HOURS PRN
Qty: 8 TABLET | Refills: 0 | Status: SHIPPED | OUTPATIENT
Start: 2023-06-27 | End: 2023-06-29

## 2023-06-27 RX ORDER — LABETALOL HYDROCHLORIDE 5 MG/ML
5 INJECTION, SOLUTION INTRAVENOUS
Status: DISCONTINUED | OUTPATIENT
Start: 2023-06-27 | End: 2023-06-27 | Stop reason: HOSPADM

## 2023-06-27 RX ORDER — ONDANSETRON 4 MG/1
4 TABLET, FILM COATED ORAL EVERY 8 HOURS PRN
Qty: 9 EACH | Refills: 0 | Status: SHIPPED | OUTPATIENT
Start: 2023-06-27 | End: 2023-06-30

## 2023-06-27 RX ORDER — LIDOCAINE AND PRILOCAINE 25; 25 MG/G; MG/G
CREAM TOPICAL ONCE
Status: COMPLETED | OUTPATIENT
Start: 2023-06-27 | End: 2023-06-27

## 2023-06-27 RX ORDER — LIDOCAINE HYDROCHLORIDE 20 MG/ML
INJECTION, SOLUTION EPIDURAL; INFILTRATION; INTRACAUDAL; PERINEURAL PRN
Status: DISCONTINUED | OUTPATIENT
Start: 2023-06-27 | End: 2023-06-27 | Stop reason: SURG

## 2023-06-27 RX ORDER — HYDROMORPHONE HYDROCHLORIDE 1 MG/ML
0.2 INJECTION, SOLUTION INTRAMUSCULAR; INTRAVENOUS; SUBCUTANEOUS
Status: DISCONTINUED | OUTPATIENT
Start: 2023-06-27 | End: 2023-06-27 | Stop reason: HOSPADM

## 2023-06-27 RX ORDER — ISOSULFAN BLUE 50 MG/5ML
INJECTION, SOLUTION SUBCUTANEOUS
Status: DISCONTINUED | OUTPATIENT
Start: 2023-06-27 | End: 2023-06-27 | Stop reason: HOSPADM

## 2023-06-27 RX ORDER — BUPIVACAINE HYDROCHLORIDE AND EPINEPHRINE 5; 5 MG/ML; UG/ML
INJECTION, SOLUTION EPIDURAL; INTRACAUDAL; PERINEURAL
Status: DISCONTINUED
Start: 2023-06-27 | End: 2023-06-27 | Stop reason: HOSPADM

## 2023-06-27 RX ORDER — EPHEDRINE SULFATE 50 MG/ML
5 INJECTION, SOLUTION INTRAVENOUS
Status: DISCONTINUED | OUTPATIENT
Start: 2023-06-27 | End: 2023-06-27 | Stop reason: HOSPADM

## 2023-06-27 RX ORDER — HYDRALAZINE HYDROCHLORIDE 20 MG/ML
5 INJECTION INTRAMUSCULAR; INTRAVENOUS
Status: DISCONTINUED | OUTPATIENT
Start: 2023-06-27 | End: 2023-06-27 | Stop reason: HOSPADM

## 2023-06-27 RX ORDER — OXYCODONE HCL 5 MG/5 ML
10 SOLUTION, ORAL ORAL
Status: DISCONTINUED | OUTPATIENT
Start: 2023-06-27 | End: 2023-06-27 | Stop reason: HOSPADM

## 2023-06-27 RX ORDER — MIDAZOLAM HYDROCHLORIDE 1 MG/ML
INJECTION INTRAMUSCULAR; INTRAVENOUS PRN
Status: DISCONTINUED | OUTPATIENT
Start: 2023-06-27 | End: 2023-06-27 | Stop reason: SURG

## 2023-06-27 RX ORDER — EPHEDRINE SULFATE 50 MG/ML
INJECTION, SOLUTION INTRAVENOUS PRN
Status: DISCONTINUED | OUTPATIENT
Start: 2023-06-27 | End: 2023-06-27 | Stop reason: SURG

## 2023-06-27 RX ORDER — SODIUM CHLORIDE, SODIUM LACTATE, POTASSIUM CHLORIDE, CALCIUM CHLORIDE 600; 310; 30; 20 MG/100ML; MG/100ML; MG/100ML; MG/100ML
INJECTION, SOLUTION INTRAVENOUS CONTINUOUS
Status: DISCONTINUED | OUTPATIENT
Start: 2023-06-27 | End: 2023-06-27 | Stop reason: HOSPADM

## 2023-06-27 RX ORDER — MEPERIDINE HYDROCHLORIDE 25 MG/ML
12.5 INJECTION INTRAMUSCULAR; INTRAVENOUS; SUBCUTANEOUS
Status: DISCONTINUED | OUTPATIENT
Start: 2023-06-27 | End: 2023-06-27 | Stop reason: HOSPADM

## 2023-06-27 RX ORDER — ALPRAZOLAM 0.25 MG/1
.25-.5 TABLET ORAL
Status: COMPLETED | OUTPATIENT
Start: 2023-06-27 | End: 2023-06-27

## 2023-06-27 RX ORDER — OXYCODONE HCL 5 MG/5 ML
5 SOLUTION, ORAL ORAL
Status: DISCONTINUED | OUTPATIENT
Start: 2023-06-27 | End: 2023-06-27 | Stop reason: HOSPADM

## 2023-06-27 RX ORDER — HYDROMORPHONE HYDROCHLORIDE 1 MG/ML
0.4 INJECTION, SOLUTION INTRAMUSCULAR; INTRAVENOUS; SUBCUTANEOUS
Status: DISCONTINUED | OUTPATIENT
Start: 2023-06-27 | End: 2023-06-27 | Stop reason: HOSPADM

## 2023-06-27 RX ORDER — HALOPERIDOL 5 MG/ML
1 INJECTION INTRAMUSCULAR
Status: DISCONTINUED | OUTPATIENT
Start: 2023-06-27 | End: 2023-06-27 | Stop reason: HOSPADM

## 2023-06-27 RX ORDER — ISOSULFAN BLUE 50 MG/5ML
INJECTION, SOLUTION SUBCUTANEOUS
Status: DISCONTINUED
Start: 2023-06-27 | End: 2023-06-27 | Stop reason: HOSPADM

## 2023-06-27 RX ORDER — HYDROMORPHONE HYDROCHLORIDE 1 MG/ML
0.1 INJECTION, SOLUTION INTRAMUSCULAR; INTRAVENOUS; SUBCUTANEOUS
Status: DISCONTINUED | OUTPATIENT
Start: 2023-06-27 | End: 2023-06-27 | Stop reason: HOSPADM

## 2023-06-27 RX ORDER — DEXAMETHASONE SODIUM PHOSPHATE 4 MG/ML
INJECTION, SOLUTION INTRA-ARTICULAR; INTRALESIONAL; INTRAMUSCULAR; INTRAVENOUS; SOFT TISSUE PRN
Status: DISCONTINUED | OUTPATIENT
Start: 2023-06-27 | End: 2023-06-27 | Stop reason: SURG

## 2023-06-27 RX ADMIN — PROPOFOL 70 MG: 10 INJECTION, EMULSION INTRAVENOUS at 10:28

## 2023-06-27 RX ADMIN — DEXAMETHASONE SODIUM PHOSPHATE 4 MG: 4 INJECTION INTRA-ARTICULAR; INTRALESIONAL; INTRAMUSCULAR; INTRAVENOUS; SOFT TISSUE at 10:33

## 2023-06-27 RX ADMIN — EPHEDRINE SULFATE 5 MG: 50 INJECTION, SOLUTION INTRAVENOUS at 10:28

## 2023-06-27 RX ADMIN — SODIUM CHLORIDE, POTASSIUM CHLORIDE, SODIUM LACTATE AND CALCIUM CHLORIDE: 600; 310; 30; 20 INJECTION, SOLUTION INTRAVENOUS at 10:23

## 2023-06-27 RX ADMIN — CEFAZOLIN 2 G: 1 INJECTION, POWDER, FOR SOLUTION INTRAMUSCULAR; INTRAVENOUS at 10:26

## 2023-06-27 RX ADMIN — FENTANYL CITRATE 50 MCG: 50 INJECTION, SOLUTION INTRAMUSCULAR; INTRAVENOUS at 10:50

## 2023-06-27 RX ADMIN — ALPRAZOLAM 0.5 MG: 0.25 TABLET ORAL at 07:03

## 2023-06-27 RX ADMIN — MIDAZOLAM 2 MG: 1 INJECTION, SOLUTION INTRAMUSCULAR; INTRAVENOUS at 10:23

## 2023-06-27 RX ADMIN — FENTANYL CITRATE 50 MCG: 50 INJECTION, SOLUTION INTRAMUSCULAR; INTRAVENOUS at 10:48

## 2023-06-27 RX ADMIN — LIDOCAINE HYDROCHLORIDE 100 MG: 20 INJECTION, SOLUTION EPIDURAL; INFILTRATION; INTRACAUDAL at 10:28

## 2023-06-27 RX ADMIN — LIDOCAINE AND PRILOCAINE 2.5 %: 25; 25 CREAM TOPICAL at 06:59

## 2023-06-27 ASSESSMENT — PAIN DESCRIPTION - PAIN TYPE
TYPE: SURGICAL PAIN

## 2023-06-27 NOTE — OP REPORT
Operative Report    Date: 6/27/2023      Pre-operative Diagnosis: Malignant neoplasm left upper breast (overlapping sites)    Post-operative Diagnosis: Same    Procedure:   Wire localized left partial mastectomy with lattice  Donut mastopexy  Left sentinel lymph node mapping including axillary reverse mapping, with excision of deep axillary nodes      Surgeon: Antionette Au M.D.    Assistant: None    Anesthesiologist: Jaren Wells DO      Anesthesia:  General    Pre-Op Meds:  Ancef    ASA class:  2    Indications:   This is a 48 year old female with left breast cancer, cT1mi cN0 M0.  After discussing risks and benefits of her options, she is ready to proceed with surgery.     Findings:   Two sentinel nodes, counts to 2000.  No blue lymphatics present.    Clip and residual biopsy cavity present, along with intact wire tip.  Pathologist felt the closest margin to the evident lesion/cavity was 2mm from inferior, which was re-excised (suture marks new margin)    Summary:   The patient underwent wire localization and radiotracer injection by Radiology.  She was taken to the operating room and anesthetized, then prepped and draped in sterile fashion.  Time out was confirmed.  Local anesthetic was injected prior to all incisions.    For axillary reverse mapping, 3cc of isosulfan blue dye and 7cc of saline was injected into the upper inner arm, and lymphatic massage was performed.    I started with a small axillary incision following her skin lines.  The clavipectoral fascia was incised and the gamma probe was used to locate the sentinel nodes which were excised using the harmonic scalpel, also using suture ligation to reapproximate lymphatic channels.   No blue arm lymphatics were present.   Background counts were less than 10% and there were no suspicious palpable nodes.  I irrigated and ensured hemostasis. I closed the fascia and deep dermal layer, then 4-0 monocryl was used for skin.      I then made a circumareolar  incision, followed by a wider concentric incision.  I de-epithelialized in between, then dissected along the anterior mammary fascial plane to the area localized in the superior breast.  There was a residual superficial post biopsy area that abutted the skin near the wire.  I carefully excised the thick firm biopsy site from the skin, then excised widely around the remaining palpable mass.  I placed usual orienting sutures and specimen mammogram was performed.    Pathology evaluated the specimen grossly for margins.  Additional inferior margin was excised.  I mobilized additional tissue medially and laterally to minimize dimpling.  I ensured hemostasis, then placed small clips to debby the edges of the cavity.  I used a 2-0 PDS to create a two layer lattice.      The larger outer incision was advanced with adjacent vascularized skin to the inner incision to decrease the breast envelope and minimize skin rippling.  3-0 vicryls were used for buried interrupted, and 4-0 Fiberwire was used for purse string at the deep dermal layer.  4-0 Stratafix was used for skin.  Dressings were placed and I was informed all counts were correct.       [place] Node Biopsy for Breast Cancer  Operation performed with curative intent Yes   Tracer(s) used to identify sentinel nodes in the upfront surgery (non-neoadjuvant) setting Radioactive tracer   Tracer(s) used to identify sentinel nodes in the neoadjuvant setting N/A   All nodes (colored or non-colored) present at the end of a dye-filled lymphatic channel were removed N/A   All significantly radioactive nodes were removed Yes   All palpably suspicious nodes were removed N/A   Biopsy-proven positive nodes marked with clips prior to chemotherapy were identified and removed N/A

## 2023-06-27 NOTE — ANESTHESIA TIME REPORT
Anesthesia Start and Stop Event Times     Date Time Event    6/27/2023 1008 Ready for Procedure     1023 Anesthesia Start     1215 Anesthesia Stop        Responsible Staff  06/27/23    Name Role Begin End    Jaren Wells D.O. Anesth 1023 1215        Overtime Reason:  no overtime (within assigned shift)    Comments:

## 2023-06-27 NOTE — ANESTHESIA POSTPROCEDURE EVALUATION
Patient: Evelyn Mullins    Procedure Summary     Date: 06/27/23 Room / Location: [facility] ROOM 28 / SURGERY SAME DAY [facility]    Anesthesia Start: 1023 Anesthesia Stop: 1215    Procedures:       WIRE LOCALIZED LEFT PARTIAL MASTECTOMY, MASTOPEXY, LEFT SENTINEL LYMPH INJECTION WITH EXCISION OF AXILLARY NODES (Left: Breast)      MASTOPEXY (Left: Breast)      BIOPSY, LYMPH NODE, SENTINEL (Left: Axilla) Diagnosis: (LEFT BREAST CANCER FEMALE OVERLAPPING SITES)    Surgeons: Antionette Au M.D. Responsible Provider: Jaren Wells D.O.    Anesthesia Type: general ASA Status: 2          Final Anesthesia Type: general  Last vitals  BP   Blood Pressure: 113/62    Temp   36.1 °C (97 °F)    Pulse   85   Resp   (!) 26    SpO2   98 %      Anesthesia Post Evaluation    Patient location during evaluation: PACU  Patient participation: complete - patient participated  Level of consciousness: awake and alert    Airway patency: patent  Anesthetic complications: no  Cardiovascular status: hemodynamically stable  Respiratory status: acceptable  Hydration status: euvolemic    PONV: none          No notable events documented.     Nurse Pain Score: 2 (NPRS)

## 2023-06-27 NOTE — DISCHARGE INSTRUCTIONS
HOME CARE INSTRUCTIONS    ACTIVITY: Rest and take it easy for the first 24 hours.  A responsible adult is recommended to remain with you during that time.  It is normal to feel sleepy.  We encourage you to not do anything that requires balance, judgment or coordination.    FOR 24 HOURS DO NOT:  Drive, operate machinery or run household appliances.  Drink beer or alcoholic beverages.  Make important decisions or sign legal documents.    SPECIAL INSTRUCTIONS: May shower, but no baths for four weeks.  Wear binder over sports bra for compression 24/7 except when showering.  Walk frequently.  Follow your post op instructions provided by Dr. Au in pre-op packet.    DIET: To avoid nausea, slowly advance diet as tolerated, avoiding spicy or greasy foods for the first day.  Add more substantial food to your diet according to your physician's instructions.  Babies can be fed formula or breast milk as soon as they are hungry.  INCREASE FLUIDS AND FIBER TO AVOID CONSTIPATION.    MEDICATIONS: Resume taking daily medication.  Take prescribed pain medication with food.  If no medication is prescribed, you may take non-aspirin pain medication if needed.  PAIN MEDICATION CAN BE VERY CONSTIPATING.  Take a stool softener or laxative such as senokot, pericolace, or milk of magnesia if needed.    Prescription given for: Tramadol and Zofran    A follow-up appointment should be arranged with your doctor; call to schedule.    You should CALL YOUR PHYSICIAN if you develop:  Fever greater than 101 degrees F.  Pain not relieved by medication, or persistent nausea or vomiting.  Excessive bleeding (blood soaking through dressing) or unexpected drainage from the wound.  Extreme redness or swelling around the incision site, drainage of pus or foul smelling drainage.  Inability to urinate or empty your bladder within 8 hours.  Problems with breathing or chest pain.    You should call 911 if you develop problems with breathing or chest pain.  If  you are unable to contact your doctor or surgical center, you should go to the nearest emergency room or urgent care center.    Physician's telephone #: Dr. Au [phone]    MILD FLU-LIKE SYMPTOMS ARE NORMAL.  YOU MAY EXPERIENCE GENERALIZED MUSCLE ACHES, THROAT IRRITATION, HEADACHE AND/OR SOME NAUSEA.    If any questions arise, call your doctor.  If your doctor is not available, please feel free to call the Surgical Center at [phone].  The Center is open Monday through Friday from 7AM to 7PM.      A registered nurse may call you a few days after your surgery to see how you are doing after your procedure.    You may also receive a survey in the mail within the next two weeks and we ask that you take a few moments to complete the survey and return it to us.  Our goal is to provide you with very good care and we value your comments.     Depression / Suicide Risk    As you are discharged from this [facility] facility, it is important to learn how to keep safe from harming yourself.    Recognize the warning signs:  Abrupt changes in personality, positive or negative- including increase in energy   Giving away possessions  Change in eating patterns- significant weight changes-  positive or negative  Change in sleeping patterns- unable to sleep or sleeping all the time   Unwillingness or inability to communicate  Depression  Unusual sadness, discouragement and loneliness  Talk of wanting to die  Neglect of personal appearance   Rebelliousness- reckless behavior  Withdrawal from people/activities they love  Confusion- inability to concentrate     If you or a loved one observes any of these behaviors or has concerns about self-harm, here's what you can do:  Talk about it- your feelings and reasons for harming yourself  Remove any means that you might use to hurt yourself (examples: pills, rope, extension cords, firearm)  Get professional help from the community (Mental Health, Substance Abuse, psychological  counseling)  Do not be alone:Call your Safe Contact- someone whom you trust who will be there for you.  Call your local CRISIS HOTLINE 856-2447 or [phone]  Call your local Children's Mobile Crisis Response Team [place] (721) 812-5157 or www.[employer]  Call the toll free National Suicide Prevention Hotlines   National Suicide Prevention Lifeline 589-277-PEYD (5361)  [facility] Line Network 800-SUICIDE (976-6564)    I acknowledge receipt and understanding of these Home Care instructions.

## 2023-06-27 NOTE — ANESTHESIA PREPROCEDURE EVALUATION
Case: [id number] Date/Time: 06/27/23 1015    Procedures:       WIRE LOCALIZED LEFT PARTIAL MASTECTOMY, POSSIBLE MASTOPEXY, LEFT SENTINEL LYMPH INJECTION WITH EXCISION OF AXILLARY NODES      MASTOPEXY      BIOPSY, LYMPH NODE, SENTINEL    Pre-op diagnosis: LEFT BREAST CANCER OVERLAPPING SITES    Location: [facility] ROOM 28 / SURGERY SAME DAY [facility]    Surgeons: Antionette Au M.D.          Relevant Problems   No relevant active problems       Physical Exam    Airway   Mallampati: II  TM distance: >3 FB  Neck ROM: full       Cardiovascular - normal exam  Rhythm: regular  Rate: normal  (-) murmur     Dental - normal exam           Pulmonary - normal exam  Breath sounds clear to auscultation     Abdominal    Neurological - normal exam                 Anesthesia Plan    ASA 2       Plan - general       Airway plan will be LMA          Induction: intravenous    Postoperative Plan: Postoperative administration of opioids is intended.    Pertinent diagnostic labs and testing reviewed    Informed Consent:    Anesthetic plan and risks discussed with patient.    Use of blood products discussed with: patient whom consented to blood products.

## 2023-06-27 NOTE — OR NURSING
1208- Patient arrived to PACU. Report received from anesthesia and OR RN. Patient on 5L of oxygen via mask, oral airway in place. Placed on monitor. Patients surgical site      1225 Oral airway Dc'ed    1245 Patient having sips of water and juice, denies pain or nausea.     1330 Spouse brought to bedside. No additional needs at this time.    1414 Patient discharged with spouse. Discharge education provided and questions answered. Educated on medications sent to pharmacy. Binder and sports bra in place. PIV removed. All belongings gathered and sent with patient.

## 2023-07-05 ASSESSMENT — LIFESTYLE VARIABLES
SMOKING_STATUS: NO
TOBACCO_USE: NO

## 2023-07-06 ENCOUNTER — HOSPITAL ENCOUNTER (OUTPATIENT)
Dept: RADIATION ONCOLOGY | Facility: MEDICAL CENTER | Age: 48
End: 2023-07-31
Attending: RADIOLOGY
Payer: COMMERCIAL

## 2023-07-06 VITALS
DIASTOLIC BLOOD PRESSURE: 82 MMHG | SYSTOLIC BLOOD PRESSURE: 118 MMHG | WEIGHT: 138.67 LBS | BODY MASS INDEX: 24.57 KG/M2 | HEIGHT: 63 IN | HEART RATE: 85 BPM | OXYGEN SATURATION: 98 %

## 2023-07-06 DIAGNOSIS — Z17.0 MALIGNANT NEOPLASM OF UPPER-OUTER QUADRANT OF LEFT BREAST IN FEMALE, ESTROGEN RECEPTOR POSITIVE (HCC): ICD-10-CM

## 2023-07-06 DIAGNOSIS — C50.412 MALIGNANT NEOPLASM OF UPPER-OUTER QUADRANT OF LEFT BREAST IN FEMALE, ESTROGEN RECEPTOR POSITIVE (HCC): ICD-10-CM

## 2023-07-06 PROCEDURE — 99205 OFFICE O/P NEW HI 60 MIN: CPT | Performed by: RADIOLOGY

## 2023-07-06 PROCEDURE — 99214 OFFICE O/P EST MOD 30 MIN: CPT | Performed by: RADIOLOGY

## 2023-07-06 RX ORDER — LEVOTHYROXINE SODIUM 175 UG/1
TABLET ORAL
COMMUNITY
Start: 2023-05-14

## 2023-07-06 ASSESSMENT — PAIN SCALES - GENERAL: PAINLEVEL: NO PAIN

## 2023-07-06 NOTE — CT SIMULATION
PATIENT NAME Evelyn Mullins   PRIMARY PHYSICIAN Cynthia Sanders [id number]   REFERRING PHYSICIAN Antionette Au M.D. 1975     Malignant neoplasm of upper-outer quadrant of left breast in female, estrogen receptor positive (HCC)  Staging form: Breast, AJCC 8th Edition  - Pathologic stage from 7/6/2023: Stage IA (pT1c, pN0(i+), cM0, G1, ER+, TN+, HER2-) - Signed by Adi Macias M.D. on 7/6/2023  Stage prefix: Initial diagnosis  Histologic grading system: 3 grade system         Treatment Planning CT Simulation        Order Questions       Question Answer Comment    Is this for a new course of treatment? Yes     Is this an Addendum? No     Implanted Device/Pacemaker No     Simulation Status Initial     Treatment Site Breast     Laterality Left     Treatment Technique 3D CRT     Other Technique(s) 3D     Treatment Pattern/Frequency Daily     Concurrent Chemotherapy No     CT Technique 3D DIBH possible     DIBH     Slice Thickness 3mm     Scan Extent Chest     Bowel Preparation No     Treatment Device(s) Vac Sravani      Wing Board     Treatment Marker Scar     Patient Attire Gown     Patient Position Supine     Patient Orientation Head First     Arm Position Up     Treatment Machine No preference     Treatment Image Guidance Ports      CBCT     Frequency (ports) Weekly     Frequency (CBCT) Daily for boost    Other Orders Weekly Physics Check                   Comments       Awaiting Oncotype results

## 2023-07-06 NOTE — PROGRESS NOTES
"Patient was seen today in clinic with Dr. Macias for consultation.  Vitals signs and weight were obtained and pain assessment was completed.  Allergies and medications were reviewed with the patient.      Vitals/Pain:  Vitals:    07/06/23 1409   BP: 118/82   BP Location: Right arm   Patient Position: Sitting   BP Cuff Size: Adult   Pulse: 85   SpO2: 98%   Weight: 62.9 kg (138 lb 10.7 oz)   Height: 1.6 m (5' 3\")   Pain Score: No pain        Allergies:   Pcn [penicillins] and Shellfish allergy    Current Medications:  Current Outpatient Medications   Medication Sig Dispense Refill    levothyroxine (SYNTHROID) 175 MCG Tab       fluticasone (FLONASE) 50 MCG/ACT nasal spray Administer 1 Spray into affected nostril(S) every day.      VITAMIN D PO Take 2,000 Int'l Units by mouth every day.      MAGNESIUM PO Take 400 mg by mouth every day.      multivitamin Tab Take 1 Tablet by mouth every day.      spironolactone (ALDACTONE) 100 MG Tab TAKE ONE TABLET BY MOUTH NIGHTLY (Patient taking differently: Take 100 mg by mouth every evening.) 90 Tab 1    Cetirizine HCl (ZYRTEC ALLERGY PO) Take 10 mg by mouth every day.      levothyroxine (SYNTHROID) 150 MCG Tab Take 175 mcg by mouth every morning on an empty stomach. 5 days per week, 150 mg 2 days per week       No current facility-administered medications for this encounter.         PCP:  Tommy Santos R.N.   "

## 2023-07-06 NOTE — CONSULTS
RADIATION ONCOLOGY CONSULT    DATE OF SERVICE: 7/6/2023    IDENTIFICATION:  Stage IA ([id number]) G1 invasive ductal carcinoma of the left upper outer quadrant of breast ER/SC positive HER2/adalberto negative with a Ki-67 of less than 10% status post lumpectomy and sentinel lymph node biopsy on 6/27/2023.      HISTORY OF PRESENT ILLNESS: I had the pleasure of seeing Ms. Mullins today in consultation at the request of Dr. Au for her breast cancer.  Patient is a healthy 48-year-old woman who by mammography was appreciated to have heterogeneously dense breasts with new calcifications in the left upper outer quadrant of her breast.  Diagnostic mammogram and ultrasound showed persistent calcifications in this location.  Biopsy confirmed at least microinvasive invasive ductal carcinoma with associated ductal carcinoma in situ.  This was felt to appear grade 2 in nature.  Tumor was ER/SC positive HER2/adalberto negative with a Ki-67 of less than 10%.  Patient elected to proceed with breast conservation surgery and had a lumpectomy and sentinel lymph node biopsy on 6/27/2023.  This confirmed a 1.6 cm invasive ductal carcinoma with associated intermediate to high-grade DCIS.  The invasive component was downgraded to grade 1.  Her margins were negative.  She did have 1 of 2 lymph nodes with isolated tumor cells.  She does plan to proceed with endocrine therapy.  She is premenopausal in nature.  She will see Dr. Womack back on July 12 to discuss her surgical findings.  She presents with me to further discuss radiation as a part of her breast conservation therapy.    PAST MEDICAL HISTORY:   Past Medical History:   Diagnosis Date    Bronchitis     Cancer (HCC) 05/30/23    High cholesterol     Technically high but not being treated    Malignant neoplasm of upper-outer quadrant of left breast in female, estrogen receptor positive (HCC) 6/20/2023    Urinary incontinence     Sneezing, jumping       PAST SURGICAL HISTORY:  Past Surgical  History:   Procedure Laterality Date    PB MASTECTOMY, PARTIAL Left 2023    Procedure: WIRE LOCALIZED LEFT PARTIAL MASTECTOMY, MASTOPEXY, LEFT SENTINEL LYMPH INJECTION WITH EXCISION OF AXILLARY NODES;  Surgeon: Antionette Au M.D.;  Location: SURGERY SAME DAY [facility];  Service: General    NE SUSPENSION OF BREAST Left 2023    Procedure: MASTOPEXY;  Surgeon: Antionette Au M.D.;  Location: SURGERY SAME DAY [facility];  Service: General    NODE BIOPSY SENTINEL Left 2023    Procedure: BIOPSY, LYMPH NODE, SENTINEL;  Surgeon: Antionette Au M.D.;  Location: SURGERY SAME DAY [facility];  Service: General    NO PERTINENT PAST SURGICAL HISTORY         GYNECOLOGICAL STATUS:  : 0    HORMONE USE:  Contraceptive hormone use 15-20 years    CURRENT MEDICATIONS:  Current Outpatient Medications   Medication Sig Dispense Refill    levothyroxine (SYNTHROID) 175 MCG Tab       fluticasone (FLONASE) 50 MCG/ACT nasal spray Administer 1 Spray into affected nostril(S) every day.      VITAMIN D PO Take 2,000 Int'l Units by mouth every day.      MAGNESIUM PO Take 400 mg by mouth every day.      multivitamin Tab Take 1 Tablet by mouth every day.      spironolactone (ALDACTONE) 100 MG Tab TAKE ONE TABLET BY MOUTH NIGHTLY (Patient taking differently: Take 100 mg by mouth every evening.) 90 Tab 1    Cetirizine HCl (ZYRTEC ALLERGY PO) Take 10 mg by mouth every day.      levothyroxine (SYNTHROID) 150 MCG Tab Take 175 mcg by mouth every morning on an empty stomach. 5 days per week, 150 mg 2 days per week       No current facility-administered medications for this encounter.       ALLERGIES:    Pcn [penicillins] and Shellfish allergy    FAMILY HISTORY:    History reviewed. No pertinent family history.    SOCIAL HISTORY:    Social History     Tobacco Use    Smoking status: Never     Passive exposure: Never    Smokeless tobacco: Never   Vaping Use    Vaping Use: Never used   Substance Use Topics    Alcohol use: Not Currently     "Drug use: Not Currently     Types: Inhaled     Comment: Tried marijuana a few times     Patient is working as a  .   Lives with: Life Partner     REVIEW OF SYSTEMS:  A complete review of systems was completed in patient's chart on 2023.  All are negative with relationship to this diagnosis with the exception of:  Patient is healing well from surgery, does not have any suspicious lesions in the breast or axilla, denies any acute areas of bony tenderness or deformity, denies any new headaches or neurologic symptoms    PHYSICAL EXAM:    Vitals:    23 1409   BP: 118/82   BP Location: Right arm   Patient Position: Sitting   BP Cuff Size: Adult   Pulse: 85   SpO2: 98%   Weight: 62.9 kg (138 lb 10.7 oz)   Height: 1.6 m (5' 3\")   Pain Score: No pain        ECO= Fully active, able to carry on all pre-disease performance without restriction.    PAIN:  Patient denies any acute/ chronic pain     GENERAL: No apparent distress.  HEENT:  Pupils are equal, round, and reactive to light.  Extraocular muscles   are intact. Sclerae nonicteric.  Conjunctivae pink.  Oral cavity, tongue   protrudes midline.   NECK:  Supple without evidence of thyromegaly.  NODES:  No peripheral adenopathy of the neck, supraclavicular fossa or axillae   bilaterally.  LUNGS:  Clear to ascultation bilaterally   HEART:  Regular rate and rhythm.  No murmur appreciated  BREAST: No suspicious lesions found in either breast or axilla.  ABDOMEN:  Soft. No evidence of hepatosplenomegaly.  Positive bowel sounds.  EXTREMITIES:  Without Edema.  NEUROLOGIC:  Cranial nerves II through XII were intact. Normal stance and gait motor and sensory grossly within normal limits       IMPRESSION:    Stage IA ([id number]) G1 invasive ductal carcinoma of the left upper outer quadrant of breast ER/[place] positive HER2/adalberto negative with a Ki-67 of less than 10% status post lumpectomy and sentinel lymph node biopsy on 2023.        RECOMMENDATIONS:    I " discussed the diagnosis, prognosis, and treatment options over a 1 hr 5 min time period, 95% of that time dedicated to ongoing treatment management.  I discussed with the patient and her partner the variables important for her staging.  We went over the role of hormone receptor status and hormone directed therapy.  Next we discussed the possibility of Oncotype testing.  I stated she would go over this further with Dr. Womack as to whether it is required in her case.  She does however have a tumor over 1 cm in size, premenopausal, and an isolated tumor cell lymph node.  After discussing the pros and cons she is inclined to want to know Oncotype status.  Next we discussed the role of radiation.  We discussed the data surrounding lumpectomy and radiation versus mastectomy.  We discussed lumpectomy with and without radiation.  I would anticipate hypofractionated radiation utilizing a high tangent followed by a tumor bed boost.  She understands that if chemotherapy were required it would precede radiation.  In addition she has a trip planned for August.  If she delayed radiation until after her trip this would still be within the optimal time for initiation of radiation.  Given the delay however she may want to consider starting her hormone directed therapy prior to her trip instead of after completion of radiation.  After her visit with Dr. Womack she will contact us for simulation after considering the timing.    We discussed the risks, benefits and side effects of treatment and the patient is amenable to treatment.  If patient has any questions or concerns, she should feel free to contact me.    Thank you for the opportunity to participate in her care.  If any questions or comments, please do not hesitate in calling.

## 2023-07-07 ENCOUNTER — TELEPHONE (OUTPATIENT)
Dept: RADIATION ONCOLOGY | Facility: MEDICAL CENTER | Age: 48
End: 2023-07-07
Payer: COMMERCIAL

## 2023-07-07 NOTE — TELEPHONE ENCOUNTER
Nutrition Services: Brief Update    RD called pt to check in following surgery. Per pt, she is doing well so far with recovery following her surgery. Pt reports she does not have much appetite, and has been in a calorie deficit with recent weight loss. She reports a weight loss of 2 lbs which is not significant for her. Per pt, she was drinking smoothies after surgery with whey protein. Pt states she is in a recovery period for 3 weeks and figuring out a plan for treatment, if she will be getting XRT or chemo. Pt asked if she should remain in a calorie deficit and continue to lose weight? She states she has a good understanding of nutrition and tracks her calories and protein as needed on [employer].     Plan/Recommend:  Recommended for pt to continue her current healthy diet regimen and including snacks between meals for added calories and protein given report recent weight loss and decreased appetite  Discussed that weight maintenance is usually recommended during treatment unless otherwise specified by MD Lesterussed RD to f/u once she starts treatment. Encouraged her to contact RD with any nutrition questions or concerns. She states she has RD phone number    Pt verbalizes understanding and is receptive to information provided.   RD available PRN and will make further recommendations as indicated.   [phone]

## 2023-07-12 ENCOUNTER — HOSPITAL ENCOUNTER (OUTPATIENT)
Dept: HEMATOLOGY ONCOLOGY | Facility: MEDICAL CENTER | Age: 48
End: 2023-07-12
Attending: INTERNAL MEDICINE
Payer: COMMERCIAL

## 2023-07-12 VITALS
BODY MASS INDEX: 24.65 KG/M2 | WEIGHT: 139.11 LBS | HEART RATE: 77 BPM | HEIGHT: 63 IN | TEMPERATURE: 99 F | SYSTOLIC BLOOD PRESSURE: 100 MMHG | OXYGEN SATURATION: 99 % | DIASTOLIC BLOOD PRESSURE: 62 MMHG

## 2023-07-12 DIAGNOSIS — C50.412 MALIGNANT NEOPLASM OF UPPER-OUTER QUADRANT OF LEFT BREAST IN FEMALE, ESTROGEN RECEPTOR POSITIVE (HCC): ICD-10-CM

## 2023-07-12 DIAGNOSIS — Z17.0 MALIGNANT NEOPLASM OF UPPER-OUTER QUADRANT OF LEFT BREAST IN FEMALE, ESTROGEN RECEPTOR POSITIVE (HCC): ICD-10-CM

## 2023-07-12 PROCEDURE — 99214 OFFICE O/P EST MOD 30 MIN: CPT | Performed by: INTERNAL MEDICINE

## 2023-07-12 PROCEDURE — 99212 OFFICE O/P EST SF 10 MIN: CPT | Performed by: INTERNAL MEDICINE

## 2023-07-12 ASSESSMENT — ENCOUNTER SYMPTOMS
RESPIRATORY NEGATIVE: 1
CONSTITUTIONAL NEGATIVE: 1
PSYCHIATRIC NEGATIVE: 1
EYES NEGATIVE: 1
NEUROLOGICAL NEGATIVE: 1
MUSCULOSKELETAL NEGATIVE: 1
GASTROINTESTINAL NEGATIVE: 1
CARDIOVASCULAR NEGATIVE: 1

## 2023-07-12 ASSESSMENT — PAIN SCALES - GENERAL: PAINLEVEL: 2=MINIMAL-SLIGHT

## 2023-07-12 NOTE — PROGRESS NOTES
Medical oncology follow-up visit: 7/12/2023      Referring Physician:  Cynthia Sanders M.D.  Primary Care:  Cynthia Sanders M.D.    Diagnosis: At least microinvasive ductal carcinoma    Chief Complaint: Newly diagnosed at least microinvasive ductal carcinoma    History of Presenting Illness:  Evelyn Mullins is a 48 y.o. premenopausal white female who had a routine mammogram on 2/23/2023 that showed determinate calcifications in the left breast BI-RADS 4A.  On 5/26/2023 she underwent a stereotactic biopsy of this area which showed DCIS with at least microinvasive ductal carcinoma, grade 2, ER positive greater than 90%, CO positive greater than 90%, Ki-67 less than 10%, HER2 indeterminate as there was not enough tissue to make a diagnosis.  She has seen Dr. Au in consultation and will undergo partial mastectomy and sentinel lymph node biopsy next week.  She is nulliparous with a somewhat irregular.  But no hot flashes.  No family history of breast cancer.  Genetic testing is pending.  She is extremely healthy other than hypothyroidism well controlled and runs ultramarathons.    Interval history 7/12/2023.  She underwent partial mastectomy and sentinel lymph node biopsy on 6/27/2023.  Pathology showed invasive ductal carcinoma, grade 1, with extensive intermediate to high-grade DCIS.  The invasive tumor measured 1.6 cm in greatest dimension.  Final margins were clear both invasive and noninvasive tumor.  1 lymph node was positive for isolated tumor cells in 1 lymph node was benign.  Postoperative course was unremarkable and she was back doing full exercise within a week after surgery.  HER2 is still pending.  She is seeing radiation and wishes to defer this till beginning of September due to social events that are planned.      Past Medical History:   Diagnosis Date    Bronchitis     Cancer (HCC) 05/30/23    High cholesterol     Technically high but not being treated    Malignant neoplasm of  upper-outer quadrant of left breast in female, estrogen receptor positive (HCC) 6/20/2023    Urinary incontinence     Sneezing, jumping       Past Surgical History:   Procedure Laterality Date    PB MASTECTOMY, PARTIAL Left 6/27/2023    Procedure: WIRE LOCALIZED LEFT PARTIAL MASTECTOMY, MASTOPEXY, LEFT SENTINEL LYMPH INJECTION WITH EXCISION OF AXILLARY NODES;  Surgeon: Antionette Au M.D.;  Location: SURGERY SAME DAY [facility];  Service: General    LA SUSPENSION OF BREAST Left 6/27/2023    Procedure: MASTOPEXY;  Surgeon: Antionette Au M.D.;  Location: SURGERY SAME DAY [facility];  Service: General    NODE BIOPSY SENTINEL Left 6/27/2023    Procedure: BIOPSY, LYMPH NODE, SENTINEL;  Surgeon: Antionette Au M.D.;  Location: SURGERY SAME DAY [facility];  Service: General    NO PERTINENT PAST SURGICAL HISTORY         Social History     Tobacco Use    Smoking status: Never     Passive exposure: Never    Smokeless tobacco: Never   Vaping Use    Vaping Use: Never used   Substance Use Topics    Alcohol use: Not Currently    Drug use: Not Currently     Types: Inhaled     Comment: Tried marijuana a few times        History reviewed. No pertinent family history.    Allergies as of 07/12/2023 - Reviewed 07/12/2023   Allergen Reaction Noted    Pcn [penicillins] Anaphylaxis 08/06/2016    Shellfish allergy Swelling 06/19/2023         Current Outpatient Medications:     levothyroxine (SYNTHROID) 175 MCG Tab, , Disp: , Rfl:     fluticasone (FLONASE) 50 MCG/ACT nasal spray, Administer 1 Spray into affected nostril(S) every day., Disp: , Rfl:     VITAMIN D PO, Take 2,000 Int'l Units by mouth every day., Disp: , Rfl:     MAGNESIUM PO, Take 400 mg by mouth every day., Disp: , Rfl:     multivitamin Tab, Take 1 Tablet by mouth every day., Disp: , Rfl:     spironolactone (ALDACTONE) 100 MG Tab, TAKE ONE TABLET BY MOUTH NIGHTLY (Patient taking differently: Take 100 mg by mouth every evening.), Disp: 90 Tab, Rfl: 1    Cetirizine HCl (ZYRTEC  "ALLERGY PO), Take 10 mg by mouth every day., Disp: , Rfl:     levothyroxine (SYNTHROID) 150 MCG Tab, Take 175 mcg by mouth every morning on an empty stomach. 5 days per week, 150 mg 2 days per week, Disp: , Rfl:     Review of Systems:  Review of Systems   Constitutional: Negative.    HENT: Negative.     Eyes: Negative.    Respiratory: Negative.     Cardiovascular: Negative.    Gastrointestinal: Negative.    Genitourinary: Negative.    Musculoskeletal: Negative.    Skin: Negative.    Neurological: Negative.    Endo/Heme/Allergies: Negative.    Psychiatric/Behavioral: Negative.            Physical Exam:  Vitals:    07/12/23 1532   BP: 100/62   BP Location: Right arm   Patient Position: Sitting   Pulse: 77   Temp: 37.2 °C (99 °F)   TempSrc: Temporal   SpO2: 99%   Weight: 63.1 kg (139 lb 1.8 oz)   Height: 1.6 m (5' 3\")       DESC; KARNOFSKY SCALE WITH ECOG EQUIVALENT: 100, Fully active, able to carry on all pre-disease performed without restriction (ECOG equivalent 0)    DISTRESS LEVEL: no acute distress    Physical Exam was not performed today      Labs:  Hospital Outpatient Visit on 05/26/2023   Component Date Value Ref Range Status    Pathology Request 05/26/2023 Sent to Histo   Final       Imaging:   All listed images below have been independently reviewed by me. I agree with the findings as summarized below:    SB-STEREOTACTIC BIOPSY LEFT INITIAL LESION    Addendum Date: 6/5/2023    Addendum dictated on 5/30/2023 by Dr. Grace as follows: Biopsy results were discussed with the patient 5/30/2023 approximately 1545 hours. Pathologic result: FINAL DIAGNOSIS: A. Left breast, calcifications, posterior to nipple, slight superior, stereotactic biopsy: At least micro-invasive ductal carcinoma with associated DCIS. Provisional rdGrdrrdarddrderd:rd rd3rd Tubule score: 3, Nuclear score: 2, Mitotic score: 1 Tumor Size: at least 2 mm (this limited core sample may not be reflective of the actual tumor size). Microcalcifications: Not identified " Lymphovascular Invasion: Not identified Prognostic Studies: Estrogen receptors: Positive, moderate, > 90% Progesterone receptors: Positive, strong, > 90% Ki-67: < 10% tumor cells staining HER2: [pending will be attempted on block A1] Comment: This case has been reviewed by a second pathologist, Dr. Motley, who concurs with the diagnosis. The radiologic and pathologic results are concordant. Surgical consultation is recommended and that should be facilitated by her primary care physician Cynthia Sanders. She has no adverse symptoms referrable to the biopsy site.    Result Date: 6/5/2023 5/26/2023 12:39 PM HISTORY/REASON FOR EXAM:  Group of calcifications anterior superior left breast COMPARISON:  02/23/2023 TECHNIQUE/EXAM DESCRIPTION AND NUMBER OF VIEWS:  Left breast stereotactic-guided vacuum-assisted core biopsy. The procedure was discussed with the patient. Risks, benefits, and alternatives were discussed. Informed written consent was obtained. A timeout was performed. The calcifications was targeted from a CC approach. The skin was cleansed with Betadine. The overlying skin and underlying breast tissue were anesthetized with 2 mL of 1% lidocaine. A small skin incision was made. 10 mL 1% lidocaine with epinephrine was injected through the biopsy needle. 12 cores were obtained using a 9g Eviva Petite biopsy device. A Eviva hourglass clip marker was placed at biopsy site. Calcifications are noted within the specimen. Hemostasis was obtained with direct pressure. There were no apparent complications. Following stereotactic biopsy the patient was sent to a dedicated mammography unit for mammogram of the left breast there is successful marker deployment. Some residual punctate calcifications are noted in the region.. FINDINGS:  The calcifications was targeted from a CC approach. The skin was cleansed with Betadine. The overlying skin and underlying breast tissue were anesthetized with 2 mL of 1% lidocaine. A small skin  incision was made. 10 mL 1% lidocaine with epinephrine was injected through the biopsy needle. 12 cores were obtained using a 9g Eviva Petite biopsy device. A Eviva hourglass clip marker was placed at biopsy site. Calcifications are noted within the specimen. Hemostasis was obtained with direct pressure. There were no apparent complications. Following stereotactic biopsy the patient was sent to a dedicated mammography unit for mammogram of the left breast there is successful marker deployment. Some residual punctate calcifications are noted in the region..     1.  Successful stereotactic biopsy of group of calcifications superior left breast near 12:00 position. 2.  Stereotactically guided placement of biopsy marker at the biopsy site. 3.  Two-view procedure mammogram demonstrates successful marker clip deployment.        Pathology:      Assessment & Plan:    1.  At least microinvasive ductal carcinoma in a background of DCIS of the left breast, ER positive greater than 90%, WI positive greater than 90%, Ki-67 less than 10%, HER2 not able to be done based on lack of tissue.  Had partial mastectomy she had stage I cancer (pT1c, pN0i+), grade 1.  HER2 still pending.    Plan: We will obtain an Oncotype DX recurrence score testing to ensure that chemotherapy is not indicated.  We will see her back in 12 weeks assuming her Oncotype is low.  Any questions and concerns raised by the patient were answered to the best of my ability. Thank you for allowing me to participate in the care for this patient. Please feel free to contact me for any questions or concerns.     Dirk Womack M.D.

## 2023-07-12 NOTE — LETTER
[facility]   [address] [place] [address],   [address]  [place] [address]  Phone: [phone]  Fax: [phone]              Evelyn Mullins  1975    Encounter Date: 7/12/2023    Dirk Womack M.D.          PROGRESS NOTE:  Medical oncology follow-up visit: 7/12/2023      Referring Physician:  Cynthia Sanders M.D.  Primary Care:  Cynthia Sanedrs M.D.    Diagnosis: At least microinvasive ductal carcinoma    Chief Complaint: Newly diagnosed at least microinvasive ductal carcinoma    History of Presenting Illness:  Evelyn Mullins is a 48 y.o. premenopausal white female who had a routine mammogram on 2/23/2023 that showed determinate calcifications in the left breast BI-RADS 4A.  On 5/26/2023 she underwent a stereotactic biopsy of this area which showed DCIS with at least microinvasive ductal carcinoma, grade 2, ER positive greater than 90%, [place] positive greater than 90%, Ki-67 less than 10%, HER2 indeterminate as there was not enough tissue to make a diagnosis.  She has seen Dr. Au in consultation and will undergo partial mastectomy and sentinel lymph node biopsy next week.  She is nulliparous with a somewhat irregular.  But no hot flashes.  No family history of breast cancer.  Genetic testing is pending.  She is extremely healthy other than hypothyroidism well controlled and runs ultramarathons.    Interval history 7/12/2023.  She underwent partial mastectomy and sentinel lymph node biopsy on 6/27/2023.  Pathology showed invasive ductal carcinoma, grade 1, with extensive intermediate to high-grade DCIS.  The invasive tumor measured 1.6 cm in greatest dimension.  Final margins were clear both invasive and noninvasive tumor.  1 lymph node was positive for isolated tumor cells in 1 lymph node was benign.  Postoperative course was unremarkable and she was back doing full exercise within a week after surgery.  HER2 is still pending.  She is seeing radiation and wishes to defer this till  beginning of September due to social events that are planned.      Past Medical History:   Diagnosis Date   • Bronchitis    • Cancer (HCC) 05/30/23   • High cholesterol     Technically high but not being treated   • Malignant neoplasm of upper-outer quadrant of left breast in female, estrogen receptor positive (HCC) 6/20/2023   • Urinary incontinence     Sneezing, jumping       Past Surgical History:   Procedure Laterality Date   • PB MASTECTOMY, PARTIAL Left 6/27/2023    Procedure: WIRE LOCALIZED LEFT PARTIAL MASTECTOMY, MASTOPEXY, LEFT SENTINEL LYMPH INJECTION WITH EXCISION OF AXILLARY NODES;  Surgeon: Antionette Au M.D.;  Location: SURGERY SAME DAY [facility];  Service: General   • RI SUSPENSION OF BREAST Left 6/27/2023    Procedure: MASTOPEXY;  Surgeon: Antionette Au M.D.;  Location: SURGERY SAME DAY [facility];  Service: General   • NODE BIOPSY SENTINEL Left 6/27/2023    Procedure: BIOPSY, LYMPH NODE, SENTINEL;  Surgeon: Antionette Au M.D.;  Location: SURGERY SAME DAY [facility];  Service: General   • NO PERTINENT PAST SURGICAL HISTORY         Social History     Tobacco Use   • Smoking status: Never     Passive exposure: Never   • Smokeless tobacco: Never   Vaping Use   • Vaping Use: Never used   Substance Use Topics   • Alcohol use: Not Currently   • Drug use: Not Currently     Types: Inhaled     Comment: Tried marijuana a few times        History reviewed. No pertinent family history.    Allergies as of 07/12/2023 - Reviewed 07/12/2023   Allergen Reaction Noted   • Pcn [penicillins] Anaphylaxis 08/06/2016   • Shellfish allergy Swelling 06/19/2023         Current Outpatient Medications:   •  levothyroxine (SYNTHROID) 175 MCG Tab, , Disp: , Rfl:   •  fluticasone (FLONASE) 50 MCG/ACT nasal spray, Administer 1 Spray into affected nostril(S) every day., Disp: , Rfl:   •  VITAMIN D PO, Take 2,000 Int'l Units by mouth every day., Disp: , Rfl:   •  MAGNESIUM PO, Take 400 mg by mouth every day., Disp: , Rfl:   •   "multivitamin Tab, Take 1 Tablet by mouth every day., Disp: , Rfl:   •  spironolactone (ALDACTONE) 100 MG Tab, TAKE ONE TABLET BY MOUTH NIGHTLY (Patient taking differently: Take 100 mg by mouth every evening.), Disp: 90 Tab, Rfl: 1  •  Cetirizine HCl (ZYRTEC ALLERGY PO), Take 10 mg by mouth every day., Disp: , Rfl:   •  levothyroxine (SYNTHROID) 150 MCG Tab, Take 175 mcg by mouth every morning on an empty stomach. 5 days per week, 150 mg 2 days per week, Disp: , Rfl:     Review of Systems:  Review of Systems   Constitutional: Negative.    HENT: Negative.     Eyes: Negative.    Respiratory: Negative.     Cardiovascular: Negative.    Gastrointestinal: Negative.    Genitourinary: Negative.    Musculoskeletal: Negative.    Skin: Negative.    Neurological: Negative.    Endo/Heme/Allergies: Negative.    Psychiatric/Behavioral: Negative.            Physical Exam:  Vitals:    07/12/23 1532   BP: 100/62   BP Location: Right arm   Patient Position: Sitting   Pulse: 77   Temp: 37.2 °C (99 °F)   TempSrc: Temporal   SpO2: 99%   Weight: 63.1 kg (139 lb 1.8 oz)   Height: 1.6 m (5' 3\")       DESC; KARNOFSKY SCALE WITH ECOG EQUIVALENT: 100, Fully active, able to carry on all pre-disease performed without restriction (ECOG equivalent 0)    DISTRESS LEVEL: no acute distress    Physical Exam was not performed today      Labs:  Hospital Outpatient Visit on 05/26/2023   Component Date Value Ref Range Status   • Pathology Request 05/26/2023 Sent to Histo   Final       Imaging:   All listed images below have been independently reviewed by me. I agree with the findings as summarized below:    SB-STEREOTACTIC BIOPSY LEFT INITIAL LESION    Addendum Date: 6/5/2023    Addendum dictated on 5/30/2023 by Dr. Grace as follows: Biopsy results were discussed with the patient 5/30/2023 approximately 1545 hours. Pathologic result: FINAL DIAGNOSIS: A. Left breast, calcifications, posterior to nipple, slight superior, stereotactic biopsy: At least " micro-invasive ductal carcinoma with associated DCIS. Provisional rdGrdrrdarddrderd:rd rd3rd Tubule score: 3, Nuclear score: 2, Mitotic score: 1 Tumor Size: at least 2 mm (this limited core sample may not be reflective of the actual tumor size). Microcalcifications: Not identified Lymphovascular Invasion: Not identified Prognostic Studies: Estrogen receptors: Positive, moderate, > 90% Progesterone receptors: Positive, strong, > 90% Ki-67: < 10% tumor cells staining HER2: [pending will be attempted on block A1] Comment: This case has been reviewed by a second pathologist, Dr. Motley, who concurs with the diagnosis. The radiologic and pathologic results are concordant. Surgical consultation is recommended and that should be facilitated by her primary care physician Cynthia Sanders. She has no adverse symptoms referrable to the biopsy site.    Result Date: 6/5/2023 5/26/2023 12:39 PM HISTORY/REASON FOR EXAM:  Group of calcifications anterior superior left breast COMPARISON:  02/23/2023 TECHNIQUE/EXAM DESCRIPTION AND NUMBER OF VIEWS:  Left breast stereotactic-guided vacuum-assisted core biopsy. The procedure was discussed with the patient. Risks, benefits, and alternatives were discussed. Informed written consent was obtained. A timeout was performed. The calcifications was targeted from a CC approach. The skin was cleansed with Betadine. The overlying skin and underlying breast tissue were anesthetized with 2 mL of 1% lidocaine. A small skin incision was made. 10 mL 1% lidocaine with epinephrine was injected through the biopsy needle. 12 cores were obtained using a 9g Eviva Petite biopsy device. A Eviva hourglass clip marker was placed at biopsy site. Calcifications are noted within the specimen. Hemostasis was obtained with direct pressure. There were no apparent complications. Following stereotactic biopsy the patient was sent to a dedicated mammography unit for mammogram of the left breast there is successful marker deployment. Some  residual punctate calcifications are noted in the region.. FINDINGS:  The calcifications was targeted from a CC approach. The skin was cleansed with Betadine. The overlying skin and underlying breast tissue were anesthetized with 2 mL of 1% lidocaine. A small skin incision was made. 10 mL 1% lidocaine with epinephrine was injected through the biopsy needle. 12 cores were obtained using a 9g Eviva Petite biopsy device. A Eviva hourglass clip marker was placed at biopsy site. Calcifications are noted within the specimen. Hemostasis was obtained with direct pressure. There were no apparent complications. Following stereotactic biopsy the patient was sent to a dedicated mammography unit for mammogram of the left breast there is successful marker deployment. Some residual punctate calcifications are noted in the region..     1.  Successful stereotactic biopsy of group of calcifications superior left breast near 12:00 position. 2.  Stereotactically guided placement of biopsy marker at the biopsy site. 3.  Two-view procedure mammogram demonstrates successful marker clip deployment.        Pathology:      Assessment & Plan:    1.  At least microinvasive ductal carcinoma in a background of DCIS of the left breast, ER positive greater than 90%, [place] positive greater than 90%, Ki-67 less than 10%, HER2 not able to be done based on lack of tissue.  Had partial mastectomy she had stage I cancer (pT1c, pN0i+), grade 1.  HER2 still pending.    Plan: We will obtain an Oncotype DX recurrence score testing to ensure that chemotherapy is not indicated.  We will see her back in 12 weeks assuming her Oncotype is low.  Any questions and concerns raised by the patient were answered to the best of my ability. Thank you for allowing me to participate in the care for this patient. Please feel free to contact me for any questions or concerns.     Dirk Womack M.D.          Antionette Au M.D.  [address] [place] [address]  [phone]  Via Fax: [phone]

## 2023-07-12 NOTE — ADDENDUM NOTE
Encounter addended by: Jose Miguel Fields, Med Ass't on: 7/12/2023 4:08 PM   Actions taken: Charge Capture section accepted

## 2023-07-14 ENCOUNTER — PATIENT OUTREACH (OUTPATIENT)
Dept: ONCOLOGY | Facility: MEDICAL CENTER | Age: 48
End: 2023-07-14
Payer: COMMERCIAL

## 2023-07-14 NOTE — PROGRESS NOTES
Patient called Nurse Navigator requesting assistance locating a women's breast cancer support group that meets in-person in the [facility].  I looked at several cancer web sites, including [place] [facility], but was unable to locate a group right away.  Patient agrees to a call back later today or tomorrow to review any information I can find.    7/14/2023 @ 0968.  Nurse Navigator called patient and left a voice message with my contact information requesting a call back to talk about an in-person support group option for the patient.    7/14/2023 @ 0901.  Patient returned call to Nurse Navigator.  I shared there is an in-person women's breast cancer support group at [facility]. A , Nia runs the meeting and can be reached at [phone].  The meeting is held at the [facility] for [facility] across the street from [facility].  Patient asked the information be sent to her personal email address & will call back if any problems with receipt.

## 2023-07-25 ENCOUNTER — PATIENT OUTREACH (OUTPATIENT)
Dept: ONCOLOGY | Facility: MEDICAL CENTER | Age: 48
End: 2023-07-25
Payer: COMMERCIAL

## 2023-07-25 NOTE — PROGRESS NOTES
On July 25, 2023, Oncology Social Worker Anabell Nunn attempted telephone contact with pt. to follow up on psychosocial distress screening.  OSW Edouard left voicemail message for pt. requesting pt. call back at earliest convenience.  OSW Edouard left contact information in voicemail message.

## 2023-07-26 ENCOUNTER — PHYSICAL THERAPY (OUTPATIENT)
Dept: PHYSICAL THERAPY | Facility: REHABILITATION | Age: 48
End: 2023-07-26
Attending: SURGERY
Payer: COMMERCIAL

## 2023-07-26 DIAGNOSIS — L90.5 AXILLARY WEB SYNDROME: ICD-10-CM

## 2023-07-26 DIAGNOSIS — L76.82 AXILLARY WEB SYNDROME: ICD-10-CM

## 2023-07-26 PROCEDURE — 97535 SELF CARE MNGMENT TRAINING: CPT

## 2023-07-26 PROCEDURE — 97162 PT EVAL MOD COMPLEX 30 MIN: CPT

## 2023-07-26 NOTE — OP THERAPY EVALUATION
"  Outpatient Physical Therapy  LYMPHEDEMA THERAPY INITIAL EVALUATION    [facility] Physical Therapy [facility] [address]  [address]  [address]  [place] [address]  Phone:  [phone]  Fax:  [phone]    Date of Evaluation: 07/26/2023    Patient: Boogie Mullins  YOB: 1975  MRN: [id number]     Referring Provider: Antionette Au M.D.  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 Axillary web syndrome  L76.82    L90.5       Subjective:   History of Present Illness:     Mechanism of injury:  Per chart: \"Stage IA ([id number]) G1 invasive ductal carcinoma of the left upper outer quadrant of breast ER/OH positive HER2/adalberto negative with a Ki-67 of less than 10% status post lumpectomy and sentinel lymph node biopsy (2) on 6/27/2023. \"    Pt reports she has some questions regarding lymphedema and prevention.       Past Medical History:   Diagnosis Date    Bronchitis     Cancer (HCC) 05/30/23    High cholesterol     Technically high but not being treated    Malignant neoplasm of upper-outer quadrant of left breast in female, estrogen receptor positive (HCC) 6/20/2023    Urinary incontinence     Sneezing, jumping     Past Surgical History:   Procedure Laterality Date    PB MASTECTOMY, PARTIAL Left 6/27/2023    Procedure: WIRE LOCALIZED LEFT PARTIAL MASTECTOMY, MASTOPEXY, LEFT SENTINEL LYMPH INJECTION WITH EXCISION OF AXILLARY NODES;  Surgeon: Antionette Au M.D.;  Location: SURGERY SAME DAY [facility];  Service: General    OH SUSPENSION OF BREAST Left 6/27/2023    Procedure: MASTOPEXY;  Surgeon: Antionette Au M.D.;  Location: SURGERY SAME DAY [facility];  Service: General    NODE BIOPSY SENTINEL Left " 6/27/2023    Procedure: BIOPSY, LYMPH NODE, SENTINEL;  Surgeon: Antionette Au M.D.;  Location: SURGERY SAME DAY [facility];  Service: General    NO PERTINENT PAST SURGICAL HISTORY       Social History     Tobacco Use    Smoking status: Never     Passive exposure: Never    Smokeless tobacco: Never   Substance Use Topics    Alcohol use: Not Currently     Family and Occupational History     Socioeconomic History    Marital status: Single     Spouse name: Not on file    Number of children: Not on file    Years of education: Not on file    Highest education level: Not on file   Occupational History    Not on file       Lymphedema Objective      Therapeutic Exercises (CPT 22957):       Therapeutic Exercise Summary: Re-discussed Spiderman cording exercise with demo and cues for technique.     Therapeutic Treatments and Modalities:     1. Self Care ADL Training (CPT 51425)    Therapeutic Treatment and Modalities Summary: Pt attended the lymphedema prevention class and is familiar with pathology of lymphedema.     Discussed the need for external compression and educated pt regarding compression garment options.  Patient was educated the optimal use of garment (all day, every day, especially during higher risk activities as discussed, remove at night).  Lymphedema risk factors were discussed .  Patient verbalized understanding to all education today.     Pt has been educated on axillary web syndrome which is thought to be a result of injury/trauma to axillary lymphatics likely due to an interruption of the lymphovenous channels which causes hardening and pooling of the lymphovenous fluid, resulting in inflammation, fibrosis, and shortening of the tissue.      Time-based treatments/modalities:    Physical Therapy Timed Treatment Charges  Functional training, self care minutes (CPT 98291): 21 minutes      Assessment and Plan:   Functional Impairments: lacks appropriate home exercise program    Assessment details:  Boogie conde  49yo F who is undergoing treatment for L-sided breast cancer including a lumpectomy in June of this year with x2 sentinel nodes and likely radiation in the fall. She attended the lymphedema prevention class and arrives today with the presence of axillary cording to her L axilla and follow up questions regarding prevention. Boogie is quite active and demonstrates excellent shoulder ROM. This is certainly in her favor regarding lymphangiogenesis and prevention. She will benefit from intervention to ensure she sustains her full ROM in preparation for radiation and that she understands varying techniques to manage potential swelling flares or ROM impairments.  Prognosis: good      Goals:   Short Term Goals:  1. Boogie will be able to verbalize three preventative strategies for lymphedema.  2. Boogie will consistently perform cording exercises to reduce cord adhesions and allow for pain-free horizontal abduction and abduction at shoulder.   3. Boogie will be able to participate in yoga classes and lifting class while utilizing preventative strategies for lymphedema.   Short term goal time span:  2-4 weeks    Long Term Goals:  1. Boogie will reduce her cord adhesions such that she will report no sensation of pull or discomfort for her L UE during overhead reaching and horizontal abduction.  2. Boogie will consistently perform preventative activity for lymphedema including sports bra and sleeve wear when appropriate.   Long term goal time span:  4-6 weeks    Plan:  Therapy options:  Physical therapy treatment to continue  Planned therapy interventions:  Compression glove, compression sleeve, decongestive exercises, gait training (CPT 12056), home exercise program, intermittent compression, manual lymph drainage, myofascial release techniques, orthotic measurements/fitting, patient education, postural exercises, skin/wound care, soft tissue manual techniques (CPT 26268), strengthening exercises, range of motion exercises and  self-care/training (CPT 28384)  Planned education:  Functional anatomy and physiology of the lymphatic system, pathophysiology of lymphedema, lymphedema exercise, lymphedema precautions, proper skin care/nutrition, compression bandaging, scar tissue management, breast cancer rehab, activity guidelines, dietary guidelines, skin care guidelines, home pump use, bandage removal, long term self-management of lymphedema, infection prevention and self massage  Frequency:  1x week  Duration in weeks:  8  Discussed with:  Patient      Functional Assessment Used    UE functional scale    Referring provider co-signature:  I have reviewed this plan of care and my co-signature certifies the need for services.    Certification Period: 07/26/2023 to  09/20/23    Physician Signature: ________________________________ Date: ______________

## 2023-08-01 ENCOUNTER — HOSPITAL ENCOUNTER (OUTPATIENT)
Dept: RADIATION ONCOLOGY | Facility: MEDICAL CENTER | Age: 48
End: 2023-08-31
Attending: RADIOLOGY
Payer: COMMERCIAL

## 2023-08-10 ENCOUNTER — PHYSICAL THERAPY (OUTPATIENT)
Dept: PHYSICAL THERAPY | Facility: REHABILITATION | Age: 48
End: 2023-08-10
Attending: SURGERY
Payer: COMMERCIAL

## 2023-08-10 DIAGNOSIS — L90.5 AXILLARY WEB SYNDROME: ICD-10-CM

## 2023-08-10 DIAGNOSIS — L76.82 AXILLARY WEB SYNDROME: ICD-10-CM

## 2023-08-10 PROCEDURE — 97110 THERAPEUTIC EXERCISES: CPT

## 2023-08-10 PROCEDURE — 97140 MANUAL THERAPY 1/> REGIONS: CPT

## 2023-08-10 NOTE — OP THERAPY DAILY TREATMENT
Outpatient Physical Therapy  LYMPHEDEMA THERAPY DAILY TREATMENT     [facility] Physical Therapy [address]  [address]  [place] [address]  Phone:  [phone]  Fax:  [phone]    Date: 08/10/2023    Patient: Boogie Mullins  YOB: 1975  MRN: [id number]     Time Calculation    Start time: 0730  Stop time: 0816 Time Calculation (min): 46 minutes         Chief Complaint: Shoulder Problem    Visit #: 2    Subjective:   History of Present Illness:     Mechanism of injury:  Arm doing ok. Not pulling quite as much but still has days where it does not feel good.       Lymphedema Objective      Left Upper Extremity Circumferential Measurements  Other: cm  Areola: cm  Breast Crease: cm  Axilla: 29.2 cm  Upper Proximal Humerus: 26.5 cm  Distal Humerus: 24.5 cm  Elbow: 22.4 cm  Mid-Forearm: 19.4 cm  Wrist: 14.4 cm  Distal Hatfield Crease: 17.7 cm  Total: 154.1 cm    Right Upper Extremity Circumferential Measurements  Other: cm  Areola: cm  Breast Crease: cm  Axilla: 29.1 cm  Upper Proximal Humerus: 27.5 cm  Distal Humerus: 24.8 cm  Elbow: 21.6 cm  Mid-Forearm: 20 cm  Wrist: 14.9 cm  Distal Hatfield Crease: 18.3 cm  Total: cm 156.2              Therapeutic Exercises (CPT 92317):     1. Foam Roll Sequence, x10ea, Added to HEP; handout provided    2. BKFO, 60sec, Added to HEP; handout provided    3. wall mitali, 60sec, Added to HEP; handout provided    4. seated spinal extension with flexion at GHJ, inhale/exhale and reach 40sec, added to HEP    Therapeutic Treatments and Modalities:     1. Manual Therapy (CPT 36379)    Therapeutic Treatment and Modalities Summary: Cording techniques to L axilla including hooking, traction, light compression at varying angles of abduction.     Time-based treatments/modalities:    Physical Therapy Timed Treatment Charges  Manual therapy minutes (CPT 21978): 26 minutes  Therapeutic exercise minutes (CPT 69740): 20 minutes      Assessment and Plan:   Assessment  details:  Boogie demonstrates excellent GHJ ROM and has been instructed on basic stretching activity to continue once she initiates radiation sometime next month. Will focus on additional preventative activity/exercises at shoulder, scapula, and thoracic spine to improve circulation, lymphatic flow, and to sustain current ROM.     Plan:  Therapy options:  Physical therapy treatment to continue  Discussed with:  Patient

## 2023-08-21 ENCOUNTER — PHYSICAL THERAPY (OUTPATIENT)
Dept: PHYSICAL THERAPY | Facility: REHABILITATION | Age: 48
End: 2023-08-21
Attending: SURGERY
Payer: COMMERCIAL

## 2023-08-21 ENCOUNTER — TELEPHONE (OUTPATIENT)
Dept: RADIATION ONCOLOGY | Facility: MEDICAL CENTER | Age: 48
End: 2023-08-21
Payer: COMMERCIAL

## 2023-08-21 DIAGNOSIS — L90.5 AXILLARY WEB SYNDROME: ICD-10-CM

## 2023-08-21 DIAGNOSIS — L76.82 AXILLARY WEB SYNDROME: ICD-10-CM

## 2023-08-21 PROCEDURE — 97140 MANUAL THERAPY 1/> REGIONS: CPT

## 2023-08-21 PROCEDURE — 97535 SELF CARE MNGMENT TRAINING: CPT

## 2023-08-21 NOTE — TELEPHONE ENCOUNTER
Nutrition Services: Brief Update    Pt called and LVM stating she has a question about supplements. RD called pt back and spoke on the phone. Pt states she is planning to start XRT on either 9/5 or 9/11. Per pt, she is doing well with nutrition and eating well. She states she is eating a balance diet with lean proteins/fish/chicken and fruits and vegetables. Pt asked if she should be taking any additional supplements for when she starts treatment. She reports she is referring to vitamin supplements and not protein supplements.     Plan/Recommend:  Discussed the benefits of getting vitamins and minerals through whole foods. RD encouraged aiming for 2.5 cups of fruit and 3 cups of vegetables daily and including a variety of fruits and vegetables  Discussed the importance of getting enough vitamin D. She states she has checked her vitamin D labs in the past   RD discussed with pt that if she is considering taking and dietary supplement, can review with RD/MD  Based on pt report, it appears she is eating a balanced diet. RD encouraged her to continue eating balanced meals    Pt verbalizes understanding and is receptive to information provided.   RD available PRN and will make further recommendations as indicated.   [phone]

## 2023-08-21 NOTE — OP THERAPY DAILY TREATMENT
Outpatient Physical Therapy  LYMPHEDEMA THERAPY DAILY TREATMENT     [facility] Physical Therapy [address]  [address]  [place] [address]  Phone:  [phone]  Fax:  [phone]    Date: 08/21/2023    Patient: Boogie Mullins  YOB: 1975  MRN: [id number]     Time Calculation    Start time: 0815  Stop time: 0859 Time Calculation (min): 44 minutes         Chief Complaint: Lymphedema Breast Cancer and Shoulder Problem    Visit #: 3    Subjective:   History of Present Illness:     Mechanism of injury:  Has questions regarding how fast lymphedema can occur. Otherwise, can feel discomfort at thumb during L shoulder horizontal abduction      Lymphedema Objective        Therapeutic Exercises (CPT 56646):     1. Foam Roll Sequence, HEP    2. BKFO, HEP; handout provided    3. wall mitali, HEP; handout provided    4. seated spinal extension with flexion at GHJ, HEP    Therapeutic Treatments and Modalities:     1. Self Care ADL Training (CPT 32536), Discussed lymphedema and prevention. Pt reporting SO with questions regarding appearance. Reviewed likelihood of slow onset and also possibility of flares or situations that may cause swelling but are not lymphedema including bee stings, mosquito bites, etc.    2. Manual Therapy (CPT 23261)    Therapeutic Treatment and Modalities Summary: -Cording techniques to L axilla including hooking, traction, light compression at varying angles of abduction.   -prone PA to thoracic spine. Pt without gross paraspinal muscle mass difference. T2 more prominent at SP, but otherwise, reasonable, pain-free mobility.     Time-based treatments/modalities:    Physical Therapy Timed Treatment Charges  Functional training, self care minutes (CPT 35340): 14 minutes  Manual therapy minutes (CPT 89056): 30 minutes      Assessment and Plan:   Assessment details:  Boogie has been doing well with all preventative strategies as well as ROM to her L UE. Her cords remain present  and taut causing discomfort to her L thumb. She will benefit from further intervention to reduce cord adhesions and prevent development of lymphedema.     Plan:  Therapy options:  Physical therapy treatment to continue  Discussed with:  Patient

## 2023-08-22 NOTE — OP THERAPY PROGRESS SUMMARY
Outpatient Physical Therapy  PROGRESS SUMMARY NOTE      [facility] Physical Therapy [facility]  [address]  [address]  [place] [address]  Phone:  [phone]  Fax:  [phone]    Date of Visit: 08/21/2023    Patient: Boogie Mullins  YOB: 1975  MRN: [id number]     Referring Provider: Antionette Au M.D.  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 Axillary web syndrome  L76.82    L90.5       Rehab Potential: good    Progress Report Period: 7/26/23-8/21/23             Objective Findings and Assessment:   Patient progression towards goals: Boogie has been seen for three visits of physical therapy for lymphedema prevention as well as to address cording to L axilla. She does have symptoms at her L thumb that will require further intervention to reduce cord adhesions and reduce the discomfort radiating there. Currently, her shoulder ROM is excellent bilaterally and she is quite active, which will likely improve her lymphatic connections and increase the capacity of her lymphatics to filter increasing fluid volumes. She will benefit from further intervention to sustain her current full ROM and to address higher level functional tasks in preparation for radiation.     Objective findings and assessment details:   Left Upper Extremity Circumferential Measurements EVAL  Axilla: 29.2 cm  Upper Proximal Humerus: 26.5 cm  Distal Humerus: 24.5 cm  Elbow: 22.4 cm  Mid-Forearm: 19.4 cm  Wrist: 14.4 cm  Distal Hatfield Crease: 17.7 cm  Total: 154.1 cm     Right Upper Extremity Circumferential Measurements EVAL  Axilla: 29.1 cm  Upper Proximal Humerus: 27.5 cm  Distal Humerus: 24.8 cm  Elbow: 21.6 cm  Mid-Forearm: 20 cm  Wrist: 14.9 cm  Distal Hatfield Crease: 18.3 cm  Total: cm 156.2      Goals:   Short Term Goals:   1. Boogie will be able  to verbalize three preventative strategies for lymphedema. Goal Met  2. Boogie will consistently perform cording exercises to reduce cord adhesions and allow for pain-free horizontal abduction and abduction at shoulder. Goal Partially Met  3. Boogie will be able to participate in yoga classes and lifting class while utilizing preventative strategies for lymphedema.Goal Met    Short term goal time span:  2-4 weeks      Long Term Goals:    1. Boogie will reduce her cord adhesions such that she will report no sensation of pull or discomfort for her L UE during overhead reaching and horizontal abduction.GOal Not Met  2. Boogie will consistently perform preventative activity for lymphedema including sports bra and sleeve wear when appropriate. Goal Partially Met    3. Boogie will sustain her current GHJ ROM within 5degrees throughout the completion of her breast cancer treatment.     Long term goal time span:  4-6 weeks    Plan:   Planned therapy interventions:  Functional Training, Self Care (CPT 89414), Manual Therapy (CPT 71677), Therapeutic Activities (CPT 17320) and Therapeutic Exercise (CPT 09144)  Frequency:  1x week  Duration in weeks:  8      Referring provider co-signature:  I have reviewed this plan of care and my co-signature certifies the need for services.     Certification Period: 08/21/2023 to 10/16/23    Physician Signature: ________________________________ Date: ______________

## 2023-08-28 ENCOUNTER — HOSPITAL ENCOUNTER (OUTPATIENT)
Dept: RADIATION ONCOLOGY | Facility: MEDICAL CENTER | Age: 48
End: 2023-08-28

## 2023-08-28 PROCEDURE — 77263 THER RADIOLOGY TX PLNG CPLX: CPT | Performed by: RADIOLOGY

## 2023-08-28 PROCEDURE — 77334 RADIATION TREATMENT AID(S): CPT | Performed by: RADIOLOGY

## 2023-08-28 PROCEDURE — 77334 RADIATION TREATMENT AID(S): CPT | Mod: 26 | Performed by: RADIOLOGY

## 2023-08-28 PROCEDURE — 77290 THER RAD SIMULAJ FIELD CPLX: CPT | Performed by: RADIOLOGY

## 2023-08-28 PROCEDURE — 77290 THER RAD SIMULAJ FIELD CPLX: CPT | Mod: 26 | Performed by: RADIOLOGY

## 2023-08-28 NOTE — RADIATION PLANNING NOTES
Clinical Treatment Planning Note    DATE OF SERVICE: 8/28/2023    DIAGNOSIS:  Malignant neoplasm of upper-outer quadrant of left breast in female, estrogen receptor positive (HCC)  Staging form: Breast, AJCC 8th Edition  - Pathologic stage from 7/6/2023: Stage IA (pT1c, pN0(i+), cM0, G1, ER+, UT+, HER2-) - Signed by Adi Macias M.D. on 7/6/2023  Stage prefix: Initial diagnosis  Histologic grading system: 3 grade system         IMAGING REVIEWED:  [x] CT     [] MRI     [] PET/CT     [] BONE SCAN     [x] MAMMO     [] OTHER      TREATMENT INTENT:   [x] CURATIVE     [] MAINTENANCE     []  PALLIATIVE      []  SUPPORTIVE     []  PROPHYLACTIC     [] BENIGN     []  CONSOLIDATIVE      [] DEFINITIVE   []  OLOGIMETASTATIC      LINE OF TREATMENT:  [x] ADJUVANT   [] DEFINITIVE   [] NEOADJUVANT   [] RE-TREATMENT      TECHNIQUE PLANNED:  [] IMRT   [x] 3D   [] SBRT   [] SRS/SRT   [] HDR   [] ELECTRON       IMRT JUSTIFICATION:  []   An immediately adjacent area has been previously irradiated and abutting portals must be established with high precision.    []  Dose escalation is planned to deliver radiation doses in excess of those commonly utilized for similar tumors with conventional treatment.    []  The target volume is concave or convex, and the critical normal tissues are within or around that convexity or concavity.    []  The target volume is in close proximity to critical structures that must be protected.    []  The volume of interest must be covered with narrow margins to adequately protect  immediately adjacent structures.      FIELDS & BLOCKING:  [x] COMPLEX BLOCKS     []  = 3 TX AREAS     []  ARCS     []  CUSTOM SHEILD        []  SIMPLE BLOCK      CHEMOTHERAPY:  []  CONCURRENT     []  INDUCTION     [] SEQUENTIAL     []  <30 DAYS FROM XRT      NOTES:    OAR CONSTRAINTS: (GUIDELINES ONLY NOT ABSOLUTE)    Target Prescribed Coverage   PTV 95% of PTV covered by 95% (cGy) of RX Dose       LICO Goal   Max point dose PTV  Eval <=110%   Contralateral Breast V5% < 2Gy   Ipsilateral Lung V15% < 20Gy   Ipsilateral Lung V35% < 10Gy   Ipsilateral Lung V50% < 5Gy   Contralateral Lung V10% < 5Gy   Heart (L Sided) V5% < 20Gy   *RTOG 1005, START-A, START-B

## 2023-08-28 NOTE — RADIATION PLANNING NOTES
DATE OF SERVICE: 8/28/2023    DIAGNOSIS:  Malignant neoplasm of upper-outer quadrant of left breast in female, estrogen receptor positive (HCC)  Staging form: Breast, AJCC 8th Edition  - Pathologic stage from 7/6/2023: Stage IA (pT1c, pN0(i+), cM0, G1, ER+, MN+, HER2-) - Signed by Adi Macias M.D. on 7/6/2023  Stage prefix: Initial diagnosis  Histologic grading system: 3 grade system       DATE OF SERVICE: 8/28/2023    TYPE OF SIMULATION: Breast       [] Right    [x] Left      GOAL OF TREATMENT:   [x] Curative  [] Palliative  [] Oligometastatic    COMPLEX:  [x] Complex Blocking   []Arcs  [] Custom Blocks  [] >3 Sites    PROCEDURE: Patient placed in supine position on wing board with VAC NIKOLAI bag with appropriate slant to compensate for slope of chest wall.  Breast/chest wall borders marked CT scan obtained to contain entire volume of interest.      I have personally reviewed the relevant data, performed the target localization, and determined all relevant factors for this patient’s simulation.

## 2023-08-31 ENCOUNTER — PHYSICAL THERAPY (OUTPATIENT)
Dept: PHYSICAL THERAPY | Facility: REHABILITATION | Age: 48
End: 2023-08-31
Attending: SURGERY
Payer: COMMERCIAL

## 2023-08-31 DIAGNOSIS — L90.5 AXILLARY WEB SYNDROME: ICD-10-CM

## 2023-08-31 DIAGNOSIS — L76.82 AXILLARY WEB SYNDROME: ICD-10-CM

## 2023-08-31 PROCEDURE — 77300 RADIATION THERAPY DOSE PLAN: CPT | Mod: 26 | Performed by: RADIOLOGY

## 2023-08-31 PROCEDURE — 97110 THERAPEUTIC EXERCISES: CPT

## 2023-08-31 PROCEDURE — 77295 3-D RADIOTHERAPY PLAN: CPT | Mod: 26 | Performed by: RADIOLOGY

## 2023-08-31 PROCEDURE — 77334 RADIATION TREATMENT AID(S): CPT | Performed by: RADIOLOGY

## 2023-08-31 PROCEDURE — 77300 RADIATION THERAPY DOSE PLAN: CPT | Performed by: RADIOLOGY

## 2023-08-31 PROCEDURE — 77334 RADIATION TREATMENT AID(S): CPT | Mod: 26 | Performed by: RADIOLOGY

## 2023-08-31 PROCEDURE — 77295 3-D RADIOTHERAPY PLAN: CPT | Performed by: RADIOLOGY

## 2023-08-31 PROCEDURE — 97140 MANUAL THERAPY 1/> REGIONS: CPT

## 2023-08-31 NOTE — OP THERAPY DAILY TREATMENT
Outpatient Physical Therapy  LYMPHEDEMA THERAPY DAILY TREATMENT     [facility] Physical [address]  [address]  [place] [address]  Phone:  [phone]  Fax:  [phone]    Date: 08/31/2023    Patient: Boogie Mullins  YOB: 1975  MRN: [id number]     Time Calculation    Start time: 1417  Stop time: 1459 Time Calculation (min): 42 minutes         Chief Complaint: Shoulder Problem    Visit #: 4    Subjective:   History of Present Illness:     Mechanism of injury:  Doing great. Has been performing all HEP and running.       Lymphedema Objective        Therapeutic Exercises (CPT 26672):     1. Foam Roll Sequence, HEP    2. BKFO, HEP; handout provided    3. wall mitali, HEP; handout provided    4. seated spinal extension with flexion at GHJ, HEP    6. wall angle with chin tuck and pelvic rotation, 2x 60sec    7. isometric ER with back rotation, [id number]    8. backwards spider, x5    Therapeutic Treatments and Modalities:     1. Manual Therapy (CPT 15230), cording techniques to L cords including traction, compression, and hooking.    Time-based treatments/modalities:    Physical Therapy Timed Treatment Charges  Manual therapy minutes (CPT 36279): 16 minutes  Therapeutic exercise minutes (CPT 88833): 26 minutes      Assessment and Plan:   Assessment details:  Boogie has sustained her full ROM to her L UE. Cords are bothersome inconsistently. She is about to begin radiation. Will follow up post-radiation to ensure she has sustained her ROM and pain-free mobility.     Plan:  Therapy options:  Physical therapy treatment to continue  Discussed with:  Patient

## 2023-09-01 ENCOUNTER — HOSPITAL ENCOUNTER (OUTPATIENT)
Dept: RADIATION ONCOLOGY | Facility: MEDICAL CENTER | Age: 48
End: 2023-09-30
Attending: RADIOLOGY
Payer: COMMERCIAL

## 2023-09-05 ENCOUNTER — HOSPITAL ENCOUNTER (OUTPATIENT)
Dept: RADIATION ONCOLOGY | Facility: MEDICAL CENTER | Age: 48
End: 2023-09-05

## 2023-09-05 LAB
CHEMOTHERAPY INFUSION START DATE: NORMAL
CHEMOTHERAPY RECORDS: 2.67
CHEMOTHERAPY RECORDS: 4005
CHEMOTHERAPY RECORDS: NORMAL
CHEMOTHERAPY RX CANCER: NORMAL
DATE 1ST CHEMO CANCER: NORMAL
RAD ONC ARIA COURSE LAST TREATMENT DATE: NORMAL
RAD ONC ARIA COURSE TREATMENT ELAPSED DAYS: NORMAL
RAD ONC ARIA REFERENCE POINT DOSAGE GIVEN TO DATE: 2.67
RAD ONC ARIA REFERENCE POINT DOSAGE GIVEN TO DATE: 2.67
RAD ONC ARIA REFERENCE POINT ID: NORMAL
RAD ONC ARIA REFERENCE POINT ID: NORMAL
RAD ONC ARIA REFERENCE POINT SESSION DOSAGE GIVEN: 2.67
RAD ONC ARIA REFERENCE POINT SESSION DOSAGE GIVEN: 2.67

## 2023-09-05 PROCEDURE — 77412 RADIATION TX DELIVERY LVL 3: CPT | Performed by: RADIOLOGY

## 2023-09-05 PROCEDURE — 77280 THER RAD SIMULAJ FIELD SMPL: CPT | Performed by: RADIOLOGY

## 2023-09-05 PROCEDURE — 77280 THER RAD SIMULAJ FIELD SMPL: CPT | Mod: 26 | Performed by: RADIOLOGY

## 2023-09-05 NOTE — CT SIMULATION
DATE OF SERVICE: 9/5/2023    Radiation Therapy Episodes       Active Episodes       Radiation Therapy: 3D CRT                   Radiation Treatments         Plan Last Treated On Elapsed Days Fractions Treated Prescribed Fraction Dose (cGy) Prescribed Total Dose (cGy)    L Breast HT 9/5/2023 0 @ [id number] 1 of 15 267 4,005                  Reference Point Last Treated On Elapsed Days Most Recent Session Dose (cGy) Total Dose (cGy)    L Breast HT 9/5/2023 0 @ [id number] 267 267    L Breast HT CP 9/5/2023 0 @ [id number] 267 267                            First Visit Simple Simulation: Called by [employer] machine to verify treatment parameters including:  treatment site, treatment dose, and treatment setup prior to first treatment. Image derived shifts reviewed in all appropriate planes.  Shifts approved.  Patient treated.    I have personally reviewed the relevant data, performed the target localization, and determined all relevant factors for this patient’s simulation.   Facial cellulitis

## 2023-09-06 ENCOUNTER — APPOINTMENT (OUTPATIENT)
Dept: PHYSICAL THERAPY | Facility: REHABILITATION | Age: 48
End: 2023-09-06
Attending: SURGERY
Payer: COMMERCIAL

## 2023-09-06 ENCOUNTER — HOSPITAL ENCOUNTER (OUTPATIENT)
Dept: RADIATION ONCOLOGY | Facility: MEDICAL CENTER | Age: 48
End: 2023-09-06
Payer: COMMERCIAL

## 2023-09-06 VITALS
BODY MASS INDEX: 23.63 KG/M2 | HEART RATE: 69 BPM | DIASTOLIC BLOOD PRESSURE: 85 MMHG | SYSTOLIC BLOOD PRESSURE: 132 MMHG | OXYGEN SATURATION: 98 % | WEIGHT: 133.38 LBS

## 2023-09-06 LAB
CHEMOTHERAPY INFUSION START DATE: NORMAL
CHEMOTHERAPY RECORDS: 2.67
CHEMOTHERAPY RECORDS: 4005
CHEMOTHERAPY RECORDS: NORMAL
CHEMOTHERAPY RX CANCER: NORMAL
DATE 1ST CHEMO CANCER: NORMAL
RAD ONC ARIA COURSE LAST TREATMENT DATE: NORMAL
RAD ONC ARIA COURSE TREATMENT ELAPSED DAYS: NORMAL
RAD ONC ARIA REFERENCE POINT DOSAGE GIVEN TO DATE: 5.34
RAD ONC ARIA REFERENCE POINT DOSAGE GIVEN TO DATE: 5.34
RAD ONC ARIA REFERENCE POINT ID: NORMAL
RAD ONC ARIA REFERENCE POINT ID: NORMAL
RAD ONC ARIA REFERENCE POINT SESSION DOSAGE GIVEN: 2.67
RAD ONC ARIA REFERENCE POINT SESSION DOSAGE GIVEN: 2.67

## 2023-09-06 PROCEDURE — 77412 RADIATION TX DELIVERY LVL 3: CPT | Performed by: RADIOLOGY

## 2023-09-06 ASSESSMENT — PAIN SCALES - GENERAL: PAINLEVEL: NO PAIN

## 2023-09-06 NOTE — ON TREATMENT VISIT
ON TREATMENT NOTE  RADIATION ONCOLOGY DEPARTMENT    Patient name:  Evelyn Mullins    Primary Physician:  Cynthia Sanders M.D. MRN: [id number]  CSN: [id number]   Referring physician:  Antionette Au M.D. : 1975, 48 y.o.     ENCOUNTER DATE:  23    DIAGNOSIS:    Malignant neoplasm of upper-outer quadrant of left breast in female, estrogen receptor positive (HCC)  Staging form: Breast, AJCC 8th Edition  - Pathologic stage from 2023: Stage IA (pT1c, pN0(i+), cM0, G1, ER+, AR+, HER2-) - Signed by Adi Macias M.D. on 2023  Stage prefix: Initial diagnosis  Histologic grading system: 3 grade system      TREATMENT SUMMARY:  Aria Treatment Information          2023   Aria Course Treatment Dates   Course First Treatment Date 2023    Course Last Treatment Date 2023    Aria Treatment Summary   L Breast HT  Plan from Course [email or web address]_[email or web address]   Fraction 2 of 15   Elapsed Course Days 1 @ [id number]   Prescribed Fraction Dose 267 cGy   Prescribed Total Dose 4,005 cGy   L Breast HT  Reference Point from Course [email or web address]_[email or web address]   Elapsed Course Days 1 @ [id number]   Session Dose 267 cGy   Total Dose 534 cGy   L Breast HT CP  Reference Point from Course [email or web address]_[email or web address]   Elapsed Course Days  @ [id number]   Session Dose 267 cGy   Total Dose 534 cGy      Radiation Treatments       Active   Plans   L Breast HT   Most recent treatment: Dose planned: 267 cGy (fraction 2 of 15 on 2023)   Total: Dose planned: 4,005 cGy   Elapsed Days: 1 @            Historical   No historical radiation treatments to show.               SUBJECTIVE:   Patient is doing well.  She does not have any changes related to her radiation.    VITAL SIGNS:    Encounter Vitals  Blood Pressure: 132/85  Pulse: 69  Pulse Oximetry: 98 %  Weight: 60.5 kg (133 lb 6.1 oz)  Pain Score: No pain      2023     3:09 PM 2023     3:32 PM 2023     2:09 PM   Pain Assessment   Pain Score NO PAIN 2=MINIMAL PA  NO PAIN          PHYSICAL EXAM:  No erythema    TOXICITY      9/6/2023     3:11 PM   Toxicity Assessment   Toxicity Assessment Breast   Fatigue (lethargy, malaise, asthenia) None   Fever (in the absence of neutropenia) None   Radiation Dermatitis None   Lymphatics Normal   RT - Pain due to RT None   Dyspnea Normal         IMPRESSION:  Cancer Staging   Malignant neoplasm of upper-outer quadrant of left breast in female, estrogen receptor positive (HCC)  Staging form: Breast, AJCC 8th Edition  - Pathologic stage from 7/6/2023: Stage IA (pT1c, pN0(i+), cM0, G1, ER+, CO+, HER2-) - Signed by Adi Macias M.D. on 7/6/2023      PLAN:  No change in treatment plan    Disposition:  Treatment plan reviewed. Questions answered. Continue therapy outlined.     Adi Macias M.D.    No orders of the defined types were placed in this encounter.

## 2023-09-07 ENCOUNTER — HOSPITAL ENCOUNTER (OUTPATIENT)
Dept: RADIATION ONCOLOGY | Facility: MEDICAL CENTER | Age: 48
End: 2023-09-07
Payer: COMMERCIAL

## 2023-09-07 LAB
CHEMOTHERAPY INFUSION START DATE: NORMAL
CHEMOTHERAPY RECORDS: 2.67
CHEMOTHERAPY RECORDS: 4005
CHEMOTHERAPY RECORDS: NORMAL
CHEMOTHERAPY RX CANCER: NORMAL
DATE 1ST CHEMO CANCER: NORMAL
RAD ONC ARIA COURSE LAST TREATMENT DATE: NORMAL
RAD ONC ARIA COURSE TREATMENT ELAPSED DAYS: NORMAL
RAD ONC ARIA REFERENCE POINT DOSAGE GIVEN TO DATE: 8.01
RAD ONC ARIA REFERENCE POINT DOSAGE GIVEN TO DATE: 8.01
RAD ONC ARIA REFERENCE POINT ID: NORMAL
RAD ONC ARIA REFERENCE POINT ID: NORMAL
RAD ONC ARIA REFERENCE POINT SESSION DOSAGE GIVEN: 2.67
RAD ONC ARIA REFERENCE POINT SESSION DOSAGE GIVEN: 2.67

## 2023-09-07 PROCEDURE — 77412 RADIATION TX DELIVERY LVL 3: CPT | Performed by: RADIOLOGY

## 2023-09-07 PROCEDURE — 77336 RADIATION PHYSICS CONSULT: CPT | Performed by: RADIOLOGY

## 2023-09-11 ENCOUNTER — HOSPITAL ENCOUNTER (OUTPATIENT)
Dept: RADIATION ONCOLOGY | Facility: MEDICAL CENTER | Age: 48
End: 2023-09-11
Payer: COMMERCIAL

## 2023-09-11 LAB
CHEMOTHERAPY INFUSION START DATE: NORMAL
CHEMOTHERAPY RECORDS: 2.67
CHEMOTHERAPY RECORDS: 4005
CHEMOTHERAPY RECORDS: NORMAL
CHEMOTHERAPY RX CANCER: NORMAL
DATE 1ST CHEMO CANCER: NORMAL
RAD ONC ARIA COURSE LAST TREATMENT DATE: NORMAL
RAD ONC ARIA COURSE TREATMENT ELAPSED DAYS: NORMAL
RAD ONC ARIA REFERENCE POINT DOSAGE GIVEN TO DATE: 10.68
RAD ONC ARIA REFERENCE POINT DOSAGE GIVEN TO DATE: 10.68
RAD ONC ARIA REFERENCE POINT ID: NORMAL
RAD ONC ARIA REFERENCE POINT ID: NORMAL
RAD ONC ARIA REFERENCE POINT SESSION DOSAGE GIVEN: 2.67
RAD ONC ARIA REFERENCE POINT SESSION DOSAGE GIVEN: 2.67

## 2023-09-11 PROCEDURE — 77412 RADIATION TX DELIVERY LVL 3: CPT | Performed by: RADIOLOGY

## 2023-09-12 ENCOUNTER — HOSPITAL ENCOUNTER (OUTPATIENT)
Dept: RADIATION ONCOLOGY | Facility: MEDICAL CENTER | Age: 48
End: 2023-09-12
Payer: COMMERCIAL

## 2023-09-12 LAB
CHEMOTHERAPY INFUSION START DATE: NORMAL
CHEMOTHERAPY RECORDS: 2.67
CHEMOTHERAPY RECORDS: 4005
CHEMOTHERAPY RECORDS: NORMAL
CHEMOTHERAPY RX CANCER: NORMAL
DATE 1ST CHEMO CANCER: NORMAL
RAD ONC ARIA COURSE LAST TREATMENT DATE: NORMAL
RAD ONC ARIA COURSE TREATMENT ELAPSED DAYS: NORMAL
RAD ONC ARIA REFERENCE POINT DOSAGE GIVEN TO DATE: 13.35
RAD ONC ARIA REFERENCE POINT DOSAGE GIVEN TO DATE: 13.35
RAD ONC ARIA REFERENCE POINT ID: NORMAL
RAD ONC ARIA REFERENCE POINT ID: NORMAL
RAD ONC ARIA REFERENCE POINT SESSION DOSAGE GIVEN: 2.67
RAD ONC ARIA REFERENCE POINT SESSION DOSAGE GIVEN: 2.67

## 2023-09-12 PROCEDURE — 77412 RADIATION TX DELIVERY LVL 3: CPT | Performed by: RADIOLOGY

## 2023-09-12 PROCEDURE — 77427 RADIATION TX MANAGEMENT X5: CPT | Performed by: RADIOLOGY

## 2023-09-13 ENCOUNTER — HOSPITAL ENCOUNTER (OUTPATIENT)
Dept: RADIATION ONCOLOGY | Facility: MEDICAL CENTER | Age: 48
End: 2023-09-13
Payer: COMMERCIAL

## 2023-09-13 VITALS
WEIGHT: 136.24 LBS | BODY MASS INDEX: 24.13 KG/M2 | HEART RATE: 76 BPM | SYSTOLIC BLOOD PRESSURE: 111 MMHG | OXYGEN SATURATION: 100 % | DIASTOLIC BLOOD PRESSURE: 76 MMHG

## 2023-09-13 LAB
CHEMOTHERAPY INFUSION START DATE: NORMAL
CHEMOTHERAPY RECORDS: 2.67
CHEMOTHERAPY RECORDS: 4005
CHEMOTHERAPY RECORDS: NORMAL
CHEMOTHERAPY RX CANCER: NORMAL
DATE 1ST CHEMO CANCER: NORMAL
RAD ONC ARIA COURSE LAST TREATMENT DATE: NORMAL
RAD ONC ARIA COURSE TREATMENT ELAPSED DAYS: NORMAL
RAD ONC ARIA REFERENCE POINT DOSAGE GIVEN TO DATE: 16.02
RAD ONC ARIA REFERENCE POINT DOSAGE GIVEN TO DATE: 16.02
RAD ONC ARIA REFERENCE POINT ID: NORMAL
RAD ONC ARIA REFERENCE POINT ID: NORMAL
RAD ONC ARIA REFERENCE POINT SESSION DOSAGE GIVEN: 2.67
RAD ONC ARIA REFERENCE POINT SESSION DOSAGE GIVEN: 2.67

## 2023-09-13 PROCEDURE — 77417 THER RADIOLOGY PORT IMAGE(S): CPT | Performed by: RADIOLOGY

## 2023-09-13 PROCEDURE — 77412 RADIATION TX DELIVERY LVL 3: CPT | Performed by: RADIOLOGY

## 2023-09-13 ASSESSMENT — PAIN SCALES - GENERAL: PAINLEVEL: NO PAIN

## 2023-09-13 NOTE — ON TREATMENT VISIT
ON TREATMENT NOTE  RADIATION ONCOLOGY DEPARTMENT    Patient name:  Evelyn Mullins    Primary Physician:  Cynthia Sanders M.D. MRN: [id number]  CSN: [id number]   Referring physician:  Antionette Au M.D. : 1975, 48 y.o.     ENCOUNTER DATE:  23    DIAGNOSIS:    Malignant neoplasm of upper-outer quadrant of left breast in female, estrogen receptor positive (HCC)  Staging form: Breast, AJCC 8th Edition  - Pathologic stage from 2023: Stage IA (pT1c, pN0(i+), cM0, G1, ER+, MT+, HER2-) - Signed by Adi Macias M.D. on 2023  Stage prefix: Initial diagnosis  Histologic grading system: 3 grade system      TREATMENT SUMMARY:  Aria Treatment Information          2023   Aria Course Treatment Dates   Course First Treatment Date 2023    Course Last Treatment Date 2023    Aria Treatment Summary   L Breast HT  Plan from Course [email or web address]_[email or web address]   Fraction 6 of 15   Elapsed Course Days 8 @ [id number]   Prescribed Fraction Dose 267 cGy   Prescribed Total Dose 4,005 cGy   L Breast HT  Reference Point from Course C1_[email or web address]   Elapsed Course Days 8 @ [id number]   Session Dose 267 cGy   Total Dose 1,602 cGy   L Breast HT CP  Reference Point from Course C1_[email or web address]   Elapsed Course Days 8 @ [id number]   Session Dose 267 cGy   Total Dose 1,602 cGy      Radiation Treatments       Active   Plans   L Breast HT   Most recent treatment: Dose planned: 267 cGy (fraction 6 of 15 on 2023)   Total: Dose planned: 4,005 cGy   Elapsed Days: 8 @ [id number]           Historical   No historical radiation treatments to show.               SUBJECTIVE:   Patient is doing well.  She continues to remain very active.  She does not yet have any skin changes related to her radiation.    VITAL SIGNS:    Encounter Vitals  Blood Pressure: 111/76  Pulse: 76  Pulse Oximetry: 100 %  Weight: 61.8 kg (136 lb 3.9 oz)  Pain Score: No pain      2023     2:37 PM 2023     3:09 PM 2023     3:32  PM 7/6/2023     2:09 PM   Pain Assessment   Pain Score NO PAIN NO PAIN 2=MINIMAL PA NO PAIN          PHYSICAL EXAM:  No erythema    TOXICITY      9/13/2023     2:40 PM 9/6/2023     3:11 PM   Toxicity Assessment   Toxicity Assessment Breast Breast   Fatigue (lethargy, malaise, asthenia) None None   Fever (in the absence of neutropenia) None None   Radiation Dermatitis None None   Lymphatics Normal Normal   RT - Pain due to RT None None   Dyspnea Normal Normal         IMPRESSION:  Cancer Staging   Malignant neoplasm of upper-outer quadrant of left breast in female, estrogen receptor positive (HCC)  Staging form: Breast, AJCC 8th Edition  - Pathologic stage from 7/6/2023: Stage IA (pT1c, pN0(i+), cM0, G1, ER+, CO+, HER2-) - Signed by Adi Macias M.D. on 7/6/2023      PLAN:  No change in treatment plan    Disposition:  Treatment plan reviewed. Questions answered. Continue therapy outlined.     Adi Macias M.D.    No orders of the defined types were placed in this encounter.

## 2023-09-14 ENCOUNTER — APPOINTMENT (OUTPATIENT)
Dept: PHYSICAL THERAPY | Facility: REHABILITATION | Age: 48
End: 2023-09-14
Attending: SURGERY
Payer: COMMERCIAL

## 2023-09-14 ENCOUNTER — HOSPITAL ENCOUNTER (OUTPATIENT)
Dept: RADIATION ONCOLOGY | Facility: MEDICAL CENTER | Age: 48
End: 2023-09-14
Payer: COMMERCIAL

## 2023-09-14 LAB
CHEMOTHERAPY INFUSION START DATE: NORMAL
CHEMOTHERAPY RECORDS: 2.67
CHEMOTHERAPY RECORDS: 4005
CHEMOTHERAPY RECORDS: NORMAL
CHEMOTHERAPY RX CANCER: NORMAL
DATE 1ST CHEMO CANCER: NORMAL
RAD ONC ARIA COURSE LAST TREATMENT DATE: NORMAL
RAD ONC ARIA COURSE TREATMENT ELAPSED DAYS: NORMAL
RAD ONC ARIA REFERENCE POINT DOSAGE GIVEN TO DATE: 18.69
RAD ONC ARIA REFERENCE POINT DOSAGE GIVEN TO DATE: 18.69
RAD ONC ARIA REFERENCE POINT ID: NORMAL
RAD ONC ARIA REFERENCE POINT ID: NORMAL
RAD ONC ARIA REFERENCE POINT SESSION DOSAGE GIVEN: 2.67
RAD ONC ARIA REFERENCE POINT SESSION DOSAGE GIVEN: 2.67

## 2023-09-14 PROCEDURE — 77412 RADIATION TX DELIVERY LVL 3: CPT | Performed by: RADIOLOGY

## 2023-09-15 ENCOUNTER — HOSPITAL ENCOUNTER (OUTPATIENT)
Dept: RADIATION ONCOLOGY | Facility: MEDICAL CENTER | Age: 48
End: 2023-09-15
Payer: COMMERCIAL

## 2023-09-15 LAB
CHEMOTHERAPY INFUSION START DATE: NORMAL
CHEMOTHERAPY RECORDS: 2.67
CHEMOTHERAPY RECORDS: 4005
CHEMOTHERAPY RECORDS: NORMAL
CHEMOTHERAPY RX CANCER: NORMAL
DATE 1ST CHEMO CANCER: NORMAL
RAD ONC ARIA COURSE LAST TREATMENT DATE: NORMAL
RAD ONC ARIA COURSE TREATMENT ELAPSED DAYS: NORMAL
RAD ONC ARIA REFERENCE POINT DOSAGE GIVEN TO DATE: 21.36
RAD ONC ARIA REFERENCE POINT DOSAGE GIVEN TO DATE: 21.36
RAD ONC ARIA REFERENCE POINT ID: NORMAL
RAD ONC ARIA REFERENCE POINT ID: NORMAL
RAD ONC ARIA REFERENCE POINT SESSION DOSAGE GIVEN: 2.67
RAD ONC ARIA REFERENCE POINT SESSION DOSAGE GIVEN: 2.67

## 2023-09-15 PROCEDURE — 77336 RADIATION PHYSICS CONSULT: CPT | Performed by: RADIOLOGY

## 2023-09-15 PROCEDURE — 77412 RADIATION TX DELIVERY LVL 3: CPT | Performed by: RADIOLOGY

## 2023-09-18 ENCOUNTER — HOSPITAL ENCOUNTER (OUTPATIENT)
Dept: RADIATION ONCOLOGY | Facility: MEDICAL CENTER | Age: 48
End: 2023-09-18
Payer: COMMERCIAL

## 2023-09-18 LAB
CHEMOTHERAPY INFUSION START DATE: NORMAL
CHEMOTHERAPY RECORDS: 2.67
CHEMOTHERAPY RECORDS: 4005
CHEMOTHERAPY RECORDS: NORMAL
CHEMOTHERAPY RX CANCER: NORMAL
DATE 1ST CHEMO CANCER: NORMAL
RAD ONC ARIA COURSE LAST TREATMENT DATE: NORMAL
RAD ONC ARIA COURSE TREATMENT ELAPSED DAYS: NORMAL
RAD ONC ARIA REFERENCE POINT DOSAGE GIVEN TO DATE: 24.03
RAD ONC ARIA REFERENCE POINT DOSAGE GIVEN TO DATE: 24.03
RAD ONC ARIA REFERENCE POINT ID: NORMAL
RAD ONC ARIA REFERENCE POINT ID: NORMAL
RAD ONC ARIA REFERENCE POINT SESSION DOSAGE GIVEN: 2.67
RAD ONC ARIA REFERENCE POINT SESSION DOSAGE GIVEN: 2.67

## 2023-09-18 PROCEDURE — 77412 RADIATION TX DELIVERY LVL 3: CPT | Performed by: RADIOLOGY

## 2023-09-19 ENCOUNTER — HOSPITAL ENCOUNTER (OUTPATIENT)
Dept: RADIATION ONCOLOGY | Facility: MEDICAL CENTER | Age: 48
End: 2023-09-19
Payer: COMMERCIAL

## 2023-09-19 LAB
CHEMOTHERAPY INFUSION START DATE: NORMAL
CHEMOTHERAPY RECORDS: 2.67
CHEMOTHERAPY RECORDS: 4005
CHEMOTHERAPY RECORDS: NORMAL
CHEMOTHERAPY RX CANCER: NORMAL
DATE 1ST CHEMO CANCER: NORMAL
RAD ONC ARIA COURSE LAST TREATMENT DATE: NORMAL
RAD ONC ARIA COURSE TREATMENT ELAPSED DAYS: NORMAL
RAD ONC ARIA REFERENCE POINT DOSAGE GIVEN TO DATE: 26.7
RAD ONC ARIA REFERENCE POINT DOSAGE GIVEN TO DATE: 26.7
RAD ONC ARIA REFERENCE POINT ID: NORMAL
RAD ONC ARIA REFERENCE POINT ID: NORMAL
RAD ONC ARIA REFERENCE POINT SESSION DOSAGE GIVEN: 2.67
RAD ONC ARIA REFERENCE POINT SESSION DOSAGE GIVEN: 2.67

## 2023-09-19 PROCEDURE — 77412 RADIATION TX DELIVERY LVL 3: CPT | Performed by: RADIOLOGY

## 2023-09-19 PROCEDURE — 77427 RADIATION TX MANAGEMENT X5: CPT | Performed by: RADIOLOGY

## 2023-09-20 ENCOUNTER — HOSPITAL ENCOUNTER (OUTPATIENT)
Dept: RADIATION ONCOLOGY | Facility: MEDICAL CENTER | Age: 48
End: 2023-09-20
Payer: COMMERCIAL

## 2023-09-20 VITALS
OXYGEN SATURATION: 99 % | HEART RATE: 55 BPM | DIASTOLIC BLOOD PRESSURE: 86 MMHG | WEIGHT: 133.82 LBS | BODY MASS INDEX: 23.71 KG/M2 | SYSTOLIC BLOOD PRESSURE: 131 MMHG

## 2023-09-20 LAB
CHEMOTHERAPY INFUSION START DATE: NORMAL
CHEMOTHERAPY RECORDS: 2.67
CHEMOTHERAPY RECORDS: 4005
CHEMOTHERAPY RECORDS: NORMAL
CHEMOTHERAPY RX CANCER: NORMAL
DATE 1ST CHEMO CANCER: NORMAL
RAD ONC ARIA COURSE LAST TREATMENT DATE: NORMAL
RAD ONC ARIA COURSE TREATMENT ELAPSED DAYS: NORMAL
RAD ONC ARIA REFERENCE POINT DOSAGE GIVEN TO DATE: 29.37
RAD ONC ARIA REFERENCE POINT DOSAGE GIVEN TO DATE: 29.37
RAD ONC ARIA REFERENCE POINT ID: NORMAL
RAD ONC ARIA REFERENCE POINT ID: NORMAL
RAD ONC ARIA REFERENCE POINT SESSION DOSAGE GIVEN: 2.67
RAD ONC ARIA REFERENCE POINT SESSION DOSAGE GIVEN: 2.67

## 2023-09-20 PROCEDURE — 77412 RADIATION TX DELIVERY LVL 3: CPT | Performed by: RADIOLOGY

## 2023-09-20 PROCEDURE — 77417 THER RADIOLOGY PORT IMAGE(S): CPT | Performed by: RADIOLOGY

## 2023-09-20 ASSESSMENT — PAIN SCALES - GENERAL: PAINLEVEL: NO PAIN

## 2023-09-20 NOTE — ON TREATMENT VISIT
ON TREATMENT NOTE  RADIATION ONCOLOGY DEPARTMENT    Patient name:  Evelyn Mullins    Primary Physician:  Cynthia Sanders M.D. MRN: [id number]  CSN: [id number]   Referring physician:  Antionette Au M.D. : 1975, 48 y.o.     ENCOUNTER DATE:  23    DIAGNOSIS:    Malignant neoplasm of upper-outer quadrant of left breast in female, estrogen receptor positive (HCC)  Staging form: Breast, AJCC 8th Edition  - Pathologic stage from 2023: Stage IA (pT1c, pN0(i+), cM0, G1, ER+, LA+, HER2-) - Signed by Adi Macias M.D. on 2023  Stage prefix: Initial diagnosis  Histologic grading system: 3 grade system      TREATMENT SUMMARY:  Aria Treatment Information          2023   Aria Course Treatment Dates   Course First Treatment Date 2023    Course Last Treatment Date 2023    Aria Treatment Summary   L Breast HT  Plan from Course C1_[email or web address]   Fraction 11 of 15   Elapsed Course Days 15 @    Prescribed Fraction Dose 267 cGy   Prescribed Total Dose 4,005 cGy   L Breast HT  Reference Point from Course C1_[email or web address]   Elapsed Course Days 15 @    Session Dose 267 cGy   Total Dose 2,937 cGy   L Breast HT CP  Reference Point from Course C1_[email or web address]   Elapsed Course Days 15 @    Session Dose 267 cGy   Total Dose 2,937 cGy      Radiation Treatments       Active   Plans   L Breast HT   Most recent treatment: Dose planned: 267 cGy (fraction 11 of 15 on 2023)   Total: Dose planned: 4,005 cGy   Elapsed Days: 15 @            Historical   No historical radiation treatments to show.               SUBJECTIVE:   Patient is doing well.  She does not have any changes she would attribute to her radiation.    VITAL SIGNS:    Encounter Vitals  Blood Pressure: 131/86  Pulse: (!) 55  Pulse Oximetry: 99 %  Weight: 60.7 kg (133 lb 13.1 oz)  Pain Score: No pain (left breast sore at night)      2023     3:10 PM 2023     2:37 PM 2023      3:09 PM 7/12/2023     3:32 PM 7/6/2023     2:09 PM   Pain Assessment   Pain Score NO PAIN NO PAIN NO PAIN 2=MINIMAL PA NO PAIN          PHYSICAL EXAM:  No erythema    TOXICITY      9/20/2023     3:11 PM 9/13/2023     2:40 PM 9/6/2023     3:11 PM   Toxicity Assessment   Toxicity Assessment Breast Breast Breast   Fatigue (lethargy, malaise, asthenia) None None None   Fever (in the absence of neutropenia) None None None   Radiation Dermatitis None None None   Lymphatics Normal Normal Normal   RT - Pain due to RT None None None   Dyspnea Normal Normal Normal         IMPRESSION:  Cancer Staging   Malignant neoplasm of upper-outer quadrant of left breast in female, estrogen receptor positive (HCC)  Staging form: Breast, AJCC 8th Edition  - Pathologic stage from 7/6/2023: Stage IA (pT1c, pN0(i+), cM0, G1, ER+, OK+, HER2-) - Signed by Adi Macias M.D. on 7/6/2023      PLAN:  No change in treatment plan    Disposition:  Treatment plan reviewed. Questions answered. Continue therapy outlined.     Adi Macias M.D.    No orders of the defined types were placed in this encounter.

## 2023-09-21 ENCOUNTER — HOSPITAL ENCOUNTER (OUTPATIENT)
Dept: RADIATION ONCOLOGY | Facility: MEDICAL CENTER | Age: 48
End: 2023-09-21
Payer: COMMERCIAL

## 2023-09-21 LAB
CHEMOTHERAPY INFUSION START DATE: NORMAL
CHEMOTHERAPY RECORDS: 2.67
CHEMOTHERAPY RECORDS: 4005
CHEMOTHERAPY RECORDS: NORMAL
CHEMOTHERAPY RX CANCER: NORMAL
DATE 1ST CHEMO CANCER: NORMAL
RAD ONC ARIA COURSE LAST TREATMENT DATE: NORMAL
RAD ONC ARIA COURSE TREATMENT ELAPSED DAYS: NORMAL
RAD ONC ARIA REFERENCE POINT DOSAGE GIVEN TO DATE: 32.04
RAD ONC ARIA REFERENCE POINT DOSAGE GIVEN TO DATE: 32.04
RAD ONC ARIA REFERENCE POINT ID: NORMAL
RAD ONC ARIA REFERENCE POINT ID: NORMAL
RAD ONC ARIA REFERENCE POINT SESSION DOSAGE GIVEN: 2.67
RAD ONC ARIA REFERENCE POINT SESSION DOSAGE GIVEN: 2.67

## 2023-09-21 PROCEDURE — 77412 RADIATION TX DELIVERY LVL 3: CPT | Performed by: RADIOLOGY

## 2023-09-21 PROCEDURE — 77417 THER RADIOLOGY PORT IMAGE(S): CPT | Performed by: RADIOLOGY

## 2023-09-22 ENCOUNTER — HOSPITAL ENCOUNTER (OUTPATIENT)
Dept: RADIATION ONCOLOGY | Facility: MEDICAL CENTER | Age: 48
End: 2023-09-22
Payer: COMMERCIAL

## 2023-09-22 LAB
CHEMOTHERAPY INFUSION START DATE: NORMAL
CHEMOTHERAPY RECORDS: 2.67
CHEMOTHERAPY RECORDS: 4005
CHEMOTHERAPY RECORDS: NORMAL
CHEMOTHERAPY RX CANCER: NORMAL
DATE 1ST CHEMO CANCER: NORMAL
RAD ONC ARIA COURSE LAST TREATMENT DATE: NORMAL
RAD ONC ARIA COURSE TREATMENT ELAPSED DAYS: NORMAL
RAD ONC ARIA REFERENCE POINT DOSAGE GIVEN TO DATE: 34.71
RAD ONC ARIA REFERENCE POINT DOSAGE GIVEN TO DATE: 34.71
RAD ONC ARIA REFERENCE POINT ID: NORMAL
RAD ONC ARIA REFERENCE POINT ID: NORMAL
RAD ONC ARIA REFERENCE POINT SESSION DOSAGE GIVEN: 2.67
RAD ONC ARIA REFERENCE POINT SESSION DOSAGE GIVEN: 2.67

## 2023-09-22 PROCEDURE — 77412 RADIATION TX DELIVERY LVL 3: CPT | Performed by: RADIOLOGY

## 2023-09-22 PROCEDURE — 77336 RADIATION PHYSICS CONSULT: CPT | Performed by: RADIOLOGY

## 2023-09-25 ENCOUNTER — HOSPITAL ENCOUNTER (OUTPATIENT)
Dept: RADIATION ONCOLOGY | Facility: MEDICAL CENTER | Age: 48
End: 2023-09-25
Payer: COMMERCIAL

## 2023-09-25 LAB
CHEMOTHERAPY INFUSION START DATE: NORMAL
CHEMOTHERAPY RECORDS: 2.67
CHEMOTHERAPY RECORDS: 4005
CHEMOTHERAPY RECORDS: NORMAL
CHEMOTHERAPY RX CANCER: NORMAL
DATE 1ST CHEMO CANCER: NORMAL
RAD ONC ARIA COURSE LAST TREATMENT DATE: NORMAL
RAD ONC ARIA COURSE TREATMENT ELAPSED DAYS: NORMAL
RAD ONC ARIA REFERENCE POINT DOSAGE GIVEN TO DATE: 37.38
RAD ONC ARIA REFERENCE POINT DOSAGE GIVEN TO DATE: 37.38
RAD ONC ARIA REFERENCE POINT ID: NORMAL
RAD ONC ARIA REFERENCE POINT ID: NORMAL
RAD ONC ARIA REFERENCE POINT SESSION DOSAGE GIVEN: 2.67
RAD ONC ARIA REFERENCE POINT SESSION DOSAGE GIVEN: 2.67

## 2023-09-25 PROCEDURE — 77412 RADIATION TX DELIVERY LVL 3: CPT | Performed by: RADIOLOGY

## 2023-09-26 ENCOUNTER — HOSPITAL ENCOUNTER (OUTPATIENT)
Dept: RADIATION ONCOLOGY | Facility: MEDICAL CENTER | Age: 48
End: 2023-09-26
Payer: COMMERCIAL

## 2023-09-26 LAB
CHEMOTHERAPY INFUSION START DATE: NORMAL
CHEMOTHERAPY RECORDS: 2.67
CHEMOTHERAPY RECORDS: 4005
CHEMOTHERAPY RECORDS: NORMAL
CHEMOTHERAPY RX CANCER: NORMAL
DATE 1ST CHEMO CANCER: NORMAL
RAD ONC ARIA COURSE LAST TREATMENT DATE: NORMAL
RAD ONC ARIA COURSE TREATMENT ELAPSED DAYS: NORMAL
RAD ONC ARIA REFERENCE POINT DOSAGE GIVEN TO DATE: 40.05
RAD ONC ARIA REFERENCE POINT DOSAGE GIVEN TO DATE: 40.05
RAD ONC ARIA REFERENCE POINT ID: NORMAL
RAD ONC ARIA REFERENCE POINT ID: NORMAL
RAD ONC ARIA REFERENCE POINT SESSION DOSAGE GIVEN: 2.67
RAD ONC ARIA REFERENCE POINT SESSION DOSAGE GIVEN: 2.67

## 2023-09-26 PROCEDURE — 77334 RADIATION TREATMENT AID(S): CPT | Mod: 26 | Performed by: RADIOLOGY

## 2023-09-26 PROCEDURE — 77307 TELETHX ISODOSE PLAN CPLX: CPT | Mod: 26 | Performed by: RADIOLOGY

## 2023-09-26 PROCEDURE — 77412 RADIATION TX DELIVERY LVL 3: CPT | Performed by: RADIOLOGY

## 2023-09-26 PROCEDURE — 77307 TELETHX ISODOSE PLAN CPLX: CPT | Performed by: RADIOLOGY

## 2023-09-26 PROCEDURE — 77334 RADIATION TREATMENT AID(S): CPT | Performed by: RADIOLOGY

## 2023-09-26 PROCEDURE — 77427 RADIATION TX MANAGEMENT X5: CPT | Performed by: RADIOLOGY

## 2023-09-27 ENCOUNTER — HOSPITAL ENCOUNTER (OUTPATIENT)
Dept: RADIATION ONCOLOGY | Facility: MEDICAL CENTER | Age: 48
End: 2023-09-27
Payer: COMMERCIAL

## 2023-09-27 VITALS
BODY MASS INDEX: 23.35 KG/M2 | WEIGHT: 131.84 LBS | HEART RATE: 77 BPM | DIASTOLIC BLOOD PRESSURE: 71 MMHG | SYSTOLIC BLOOD PRESSURE: 102 MMHG | OXYGEN SATURATION: 100 %

## 2023-09-27 LAB
CHEMOTHERAPY INFUSION START DATE: NORMAL
CHEMOTHERAPY RECORDS: 1000
CHEMOTHERAPY RECORDS: 2
CHEMOTHERAPY RECORDS: NORMAL
CHEMOTHERAPY RX CANCER: NORMAL
DATE 1ST CHEMO CANCER: NORMAL
RAD ONC ARIA COURSE LAST TREATMENT DATE: NORMAL
RAD ONC ARIA COURSE TREATMENT ELAPSED DAYS: NORMAL
RAD ONC ARIA REFERENCE POINT DOSAGE GIVEN TO DATE: 2
RAD ONC ARIA REFERENCE POINT ID: NORMAL
RAD ONC ARIA REFERENCE POINT SESSION DOSAGE GIVEN: 2

## 2023-09-27 PROCEDURE — 77412 RADIATION TX DELIVERY LVL 3: CPT | Performed by: RADIOLOGY

## 2023-09-27 PROCEDURE — 77280 THER RAD SIMULAJ FIELD SMPL: CPT | Performed by: RADIOLOGY

## 2023-09-27 PROCEDURE — 77280 THER RAD SIMULAJ FIELD SMPL: CPT | Mod: 26 | Performed by: RADIOLOGY

## 2023-09-27 ASSESSMENT — PAIN SCALES - GENERAL: PAINLEVEL: NO PAIN

## 2023-09-27 NOTE — CT SIMULATION
DATE OF SERVICE: 9/27/2023    Radiation Therapy Episodes       Active Episodes       Radiation Therapy: 3D CRT                   Radiation Treatments         Plan Last Treated On Elapsed Days Fractions Treated Prescribed Fraction Dose (cGy) Prescribed Total Dose (cGy)    L Breast HT 9/26/2023 21 @ [id number] 15 of 15 267 4,005    L Brst Boost 9/27/2023 22 @ [id number] 1 of 5 200 1,000                  Reference Point Last Treated On Elapsed Days Most Recent Session Dose (cGy) Total Dose (cGy)    L Breast HT 9/26/2023 21 @ [id number] 267 4,005    L Breast HT CP 9/26/2023 21 @ [id number] 267 4,005    L Brst Boost 9/27/2023 22 @ [id number] 200 200                            First Visit Simple Simulation: Called by [facility] machine to verify treatment parameters including:  treatment site, treatment dose, and treatment setup prior to first treatment. Image derived shifts reviewed in all appropriate planes.  Shifts approved.  Patient treated.    I have personally reviewed the relevant data, performed the target localization, and determined all relevant factors for this patient’s simulation.

## 2023-09-27 NOTE — ON TREATMENT VISIT
"  ON TREATMENT NOTE  RADIATION ONCOLOGY DEPARTMENT    Patient name:  Evelyn Mullins    Primary Physician:  Cynthia Sanders M.D. MRN: [id number]  CSN: [id number]   Referring physician:  Antionette Au M.D. : 1975, 48 y.o.     ENCOUNTER DATE:  23    DIAGNOSIS:    Malignant neoplasm of upper-outer quadrant of left breast in female, estrogen receptor positive (HCC)  Staging form: Breast, AJCC 8th Edition  - Pathologic stage from 2023: Stage IA (pT1c, pN0(i+), cM0, G1, ER+, WA+, HER2-) - Signed by Adi Macias M.D. on 2023  Stage prefix: Initial diagnosis  Histologic grading system: 3 grade system      TREATMENT SUMMARY:  Aria Treatment Information          2023   Aria Course Treatment Dates   Course First Treatment Date 2023    Course Last Treatment Date 2023    Aria Treatment Summary   L Brst Boost  Plan from Course [email or web address]_[email or web address]   Fraction 1 of 5   Elapsed Course Days  @    Prescribed Fraction Dose 200 cGy   Prescribed Total Dose 1,000 cGy   L Brst Boost  Reference Point from Course [email or web address]_[email or web address]   Elapsed Course Days  @    Session Dose 200 cGy   Total Dose 200 cGy      Radiation Treatments       Active   Plans   L Breast HT   Most recent treatment: Dose planned: 267 cGy (fraction 15 of 15 on 2023)   Total: Dose planned: 4,005 cGy   Elapsed Days:  @ [id number]      L Brst Boost   Most recent treatment: Dose planned: 200 cGy (fraction 1 of 5 on 2023)   Total: Dose planned: 1,000 cGy   Elapsed Days:  @            Historical   No historical radiation treatments to show.               SUBJECTIVE:   Patient is doing well.  She continues to train through running and riding but can tell some quantifiable metrics of lower energy exertion.  However it is not limiting in nature.  Although on  she did have a much more \"lazy day\" which did interfere with her plans later in the day.  Her skin continues to do " well.    VITAL SIGNS:    Encounter Vitals  Blood Pressure: 102/71  Pulse: 77  Pulse Oximetry: 100 %  Weight: 59.8 kg (131 lb 13.4 oz)  Pain Score: No pain      9/27/2023    11:33 AM 9/20/2023     3:10 PM 9/13/2023     2:37 PM 9/6/2023     3:09 PM 7/12/2023     3:32 PM 7/6/2023     2:09 PM   Pain Assessment   Pain Score NO PAIN NO PAIN NO PAIN NO PAIN 2=MINIMAL PA NO PAIN          PHYSICAL EXAM:  No erythema    TOXICITY      9/27/2023    11:35 AM 9/20/2023     3:11 PM 9/13/2023     2:40 PM 9/6/2023     3:11 PM   Toxicity Assessment   Toxicity Assessment Breast Breast Breast Breast   Fatigue (lethargy, malaise, asthenia) Increased fatigue over baseline, but not altering normal activities None None None   Fever (in the absence of neutropenia) None None None None   Radiation Dermatitis Faint erythema or dry desquamation None None None   Lymphatics Mild lymphedema Normal Normal Normal   RT - Pain due to RT None None None None   Dyspnea Normal Normal Normal Normal         IMPRESSION:  Cancer Staging   Malignant neoplasm of upper-outer quadrant of left breast in female, estrogen receptor positive (HCC)  Staging form: Breast, AJCC 8th Edition  - Pathologic stage from 7/6/2023: Stage IA (pT1c, pN0(i+), cM0, G1, ER+, IA+, HER2-) - Signed by Adi Macias M.D. on 7/6/2023      PLAN:  No change in treatment plan    Disposition:  Treatment plan reviewed. Questions answered. Continue therapy outlined.     Adi Macias M.D.    No orders of the defined types were placed in this encounter.

## 2023-09-28 ENCOUNTER — HOSPITAL ENCOUNTER (OUTPATIENT)
Dept: RADIATION ONCOLOGY | Facility: MEDICAL CENTER | Age: 48
End: 2023-09-28
Payer: COMMERCIAL

## 2023-09-28 LAB

## 2023-09-28 PROCEDURE — 77412 RADIATION TX DELIVERY LVL 3: CPT | Performed by: RADIOLOGY

## 2023-09-28 PROCEDURE — 77014 PR CT GUIDANCE PLACEMENT RAD THERAPY FIELDS: CPT | Mod: 26 | Performed by: RADIOLOGY

## 2023-09-29 ENCOUNTER — HOSPITAL ENCOUNTER (OUTPATIENT)
Dept: RADIATION ONCOLOGY | Facility: MEDICAL CENTER | Age: 48
End: 2023-09-29
Payer: COMMERCIAL

## 2023-09-29 LAB
CHEMOTHERAPY INFUSION START DATE: NORMAL
CHEMOTHERAPY RECORDS: 1000
CHEMOTHERAPY RECORDS: 2
CHEMOTHERAPY RECORDS: NORMAL
CHEMOTHERAPY RX CANCER: NORMAL
DATE 1ST CHEMO CANCER: NORMAL
RAD ONC ARIA COURSE LAST TREATMENT DATE: NORMAL
RAD ONC ARIA COURSE TREATMENT ELAPSED DAYS: NORMAL
RAD ONC ARIA REFERENCE POINT DOSAGE GIVEN TO DATE: 6
RAD ONC ARIA REFERENCE POINT ID: NORMAL
RAD ONC ARIA REFERENCE POINT SESSION DOSAGE GIVEN: 2

## 2023-09-29 PROCEDURE — 77336 RADIATION PHYSICS CONSULT: CPT | Performed by: RADIOLOGY

## 2023-09-29 PROCEDURE — 77014 PR CT GUIDANCE PLACEMENT RAD THERAPY FIELDS: CPT | Mod: 26 | Performed by: RADIOLOGY

## 2023-09-29 PROCEDURE — 77412 RADIATION TX DELIVERY LVL 3: CPT | Performed by: RADIOLOGY

## 2023-09-29 PROCEDURE — 77387 GUIDANCE FOR RADJ TX DLVR: CPT | Performed by: RADIOLOGY

## 2023-10-02 ENCOUNTER — HOSPITAL ENCOUNTER (OUTPATIENT)
Dept: RADIATION ONCOLOGY | Facility: MEDICAL CENTER | Age: 48
End: 2023-10-02

## 2023-10-02 ENCOUNTER — HOSPITAL ENCOUNTER (OUTPATIENT)
Dept: RADIATION ONCOLOGY | Facility: MEDICAL CENTER | Age: 48
End: 2023-10-31
Attending: RADIOLOGY
Payer: COMMERCIAL

## 2023-10-02 LAB
CHEMOTHERAPY INFUSION START DATE: NORMAL
CHEMOTHERAPY RECORDS: 1000
CHEMOTHERAPY RECORDS: 2
CHEMOTHERAPY RECORDS: NORMAL
CHEMOTHERAPY RX CANCER: NORMAL
DATE 1ST CHEMO CANCER: NORMAL
RAD ONC ARIA COURSE LAST TREATMENT DATE: NORMAL
RAD ONC ARIA COURSE TREATMENT ELAPSED DAYS: NORMAL
RAD ONC ARIA REFERENCE POINT DOSAGE GIVEN TO DATE: 8
RAD ONC ARIA REFERENCE POINT ID: NORMAL
RAD ONC ARIA REFERENCE POINT SESSION DOSAGE GIVEN: 2

## 2023-10-02 PROCEDURE — 77412 RADIATION TX DELIVERY LVL 3: CPT | Performed by: RADIOLOGY

## 2023-10-02 PROCEDURE — 77014 PR CT GUIDANCE PLACEMENT RAD THERAPY FIELDS: CPT | Mod: 26 | Performed by: RADIOLOGY

## 2023-10-02 PROCEDURE — 77387 GUIDANCE FOR RADJ TX DLVR: CPT | Performed by: RADIOLOGY

## 2023-10-03 ENCOUNTER — HOSPITAL ENCOUNTER (OUTPATIENT)
Dept: RADIATION ONCOLOGY | Facility: MEDICAL CENTER | Age: 48
End: 2023-10-03

## 2023-10-03 LAB
CHEMOTHERAPY INFUSION START DATE: NORMAL
CHEMOTHERAPY INFUSION START DATE: NORMAL
CHEMOTHERAPY INFUSION STOP DATE: NORMAL
CHEMOTHERAPY RECORDS: 1000
CHEMOTHERAPY RECORDS: 1000
CHEMOTHERAPY RECORDS: 2
CHEMOTHERAPY RECORDS: 2
CHEMOTHERAPY RECORDS: 2.67
CHEMOTHERAPY RECORDS: 4005
CHEMOTHERAPY RECORDS: NORMAL
CHEMOTHERAPY RX CANCER: NORMAL
CHEMOTHERAPY RX CANCER: NORMAL
DATE 1ST CHEMO CANCER: NORMAL
DATE 1ST CHEMO CANCER: NORMAL
RAD ONC ARIA COURSE LAST TREATMENT DATE: NORMAL
RAD ONC ARIA COURSE LAST TREATMENT DATE: NORMAL
RAD ONC ARIA COURSE TREATMENT ELAPSED DAYS: NORMAL
RAD ONC ARIA COURSE TREATMENT ELAPSED DAYS: NORMAL
RAD ONC ARIA REFERENCE POINT DOSAGE GIVEN TO DATE: 10
RAD ONC ARIA REFERENCE POINT DOSAGE GIVEN TO DATE: 10
RAD ONC ARIA REFERENCE POINT DOSAGE GIVEN TO DATE: 40.05
RAD ONC ARIA REFERENCE POINT DOSAGE GIVEN TO DATE: 40.05
RAD ONC ARIA REFERENCE POINT ID: NORMAL
RAD ONC ARIA REFERENCE POINT SESSION DOSAGE GIVEN: 2

## 2023-10-03 PROCEDURE — 77412 RADIATION TX DELIVERY LVL 3: CPT | Performed by: RADIOLOGY

## 2023-10-03 PROCEDURE — 77014 PR CT GUIDANCE PLACEMENT RAD THERAPY FIELDS: CPT | Mod: 26 | Performed by: RADIOLOGY

## 2023-10-03 PROCEDURE — 77387 GUIDANCE FOR RADJ TX DLVR: CPT | Performed by: RADIOLOGY

## 2023-10-03 PROCEDURE — 77427 RADIATION TX MANAGEMENT X5: CPT | Performed by: RADIOLOGY

## 2023-10-04 NOTE — RADIATION COMPLETION NOTES
END OF TREATMENT SUMMARY    Patient name:  Evelyn Mullins    Primary Physician:  Cynthia Sanders M.D. MRN: [id number]  CSN: [id number]   Referring physician:  Dr. Au : 1975, 48 y.o.       TREATMENT SUMMARY:        Course First Treatment Date 2023    Course Last Treatment Date 10/03/2023   Course Elapsed Days 28 @ [id number]   Course Intent Curative     Radiation Therapy Episodes       Active Episodes       Radiation Therapy: 3D CRT                   Radiation Treatments         Plan Last Treated On Elapsed Days Fractions Treated Prescribed Fraction Dose (cGy) Prescribed Total Dose (cGy)    L Breast HT 10/3/2023 28 @ [id number] 15 of 15 267 4,005    L Brst Boost 10/3/2023 28 @ [id number] 5 of 5 200 1,000                  Reference Point Last Treated On Elapsed Days Most Recent Session Dose (cGy) Total Dose (cGy)    L Breast HT 10/3/2023 28 @ [id number] -- 4,005    L Breast HT CP 10/3/2023 28 @ [id number] -- 4,005    L Brst Boost 10/3/2023 28 @ [id number] -- 1,000                                     STAGE:   Malignant neoplasm of upper-outer quadrant of left breast in female, estrogen receptor positive (HCC)  Staging form: Breast, AJCC 8th Edition  - Pathologic stage from 2023: Stage IA (pT1c, pN0(i+), cM0, G1, ER+, MD+, HER2-) - Signed by Adi Macias M.D. on 2023  Stage prefix: Initial diagnosis  Histologic grading system: 3 grade system       TREATMENT INDICATION:   Breast conservation therapy     CONCURRENT SYSTEMIC TREATMENT:   None     RT COURSE DISCONTINUED EARLY:   No     PATIENT EXPERIENCE:       2023    11:35 AM 2023     3:11 PM 2023     2:40 PM 2023     3:11 PM   Toxicity Assessment   Toxicity Assessment Breast Breast Breast Breast   Fatigue (lethargy, malaise, asthenia) Increased fatigue over baseline, but not altering normal activities None None None   Fever (in the absence of neutropenia) None None None None   Radiation Dermatitis  Faint erythema or dry desquamation None None None   Lymphatics Mild lymphedema Normal Normal Normal   RT - Pain due to RT None None None None   Dyspnea Normal Normal Normal Normal        FOLLOW-UP PLAN:   6 Weeks     COMMENT:          ANATOMIC TARGET SUMMARY    ANATOMIC TARGET MODALITY TECHNIQUE   Left breast   External beam, photons 3D Conformal            COMMENT:         DIAGRAMS:      DOSE VOLUME HISTOGRAMS:

## 2023-10-05 ENCOUNTER — HOSPITAL ENCOUNTER (OUTPATIENT)
Dept: HEMATOLOGY ONCOLOGY | Facility: MEDICAL CENTER | Age: 48
End: 2023-10-05
Attending: INTERNAL MEDICINE
Payer: COMMERCIAL

## 2023-10-05 VITALS
HEIGHT: 63 IN | TEMPERATURE: 98.8 F | SYSTOLIC BLOOD PRESSURE: 102 MMHG | BODY MASS INDEX: 22.99 KG/M2 | WEIGHT: 129.74 LBS | OXYGEN SATURATION: 98 % | HEART RATE: 85 BPM | DIASTOLIC BLOOD PRESSURE: 70 MMHG | RESPIRATION RATE: 12 BRPM

## 2023-10-05 DIAGNOSIS — Z17.0 MALIGNANT NEOPLASM OF UPPER-OUTER QUADRANT OF LEFT BREAST IN FEMALE, ESTROGEN RECEPTOR POSITIVE (HCC): ICD-10-CM

## 2023-10-05 DIAGNOSIS — C50.412 MALIGNANT NEOPLASM OF UPPER-OUTER QUADRANT OF LEFT BREAST IN FEMALE, ESTROGEN RECEPTOR POSITIVE (HCC): ICD-10-CM

## 2023-10-05 PROCEDURE — 99212 OFFICE O/P EST SF 10 MIN: CPT | Performed by: INTERNAL MEDICINE

## 2023-10-05 PROCEDURE — 99214 OFFICE O/P EST MOD 30 MIN: CPT | Performed by: INTERNAL MEDICINE

## 2023-10-05 RX ORDER — TAMOXIFEN CITRATE 20 MG/1
20 TABLET ORAL DAILY
Qty: 60 TABLET | Refills: 3 | Status: SHIPPED | OUTPATIENT
Start: 2023-10-05 | End: 2024-02-27

## 2023-10-05 RX ORDER — CALCIUM CARBONATE 260MG(650)
TABLET,CHEWABLE ORAL
COMMUNITY
Start: 2023-09-05

## 2023-10-05 RX ORDER — MELATONIN 3 MG
LOZENGE ORAL
COMMUNITY
Start: 2023-09-05

## 2023-10-05 ASSESSMENT — ENCOUNTER SYMPTOMS
CARDIOVASCULAR NEGATIVE: 1
NEUROLOGICAL NEGATIVE: 1
EYES NEGATIVE: 1
PSYCHIATRIC NEGATIVE: 1
CONSTITUTIONAL NEGATIVE: 1
RESPIRATORY NEGATIVE: 1
MUSCULOSKELETAL NEGATIVE: 1
GASTROINTESTINAL NEGATIVE: 1

## 2023-10-05 ASSESSMENT — PAIN SCALES - GENERAL: PAINLEVEL: NO PAIN

## 2023-10-05 NOTE — ADDENDUM NOTE
Encounter addended by: Jose Miguel Fields, Med Ass't on: 10/5/2023 11:55 AM   Actions taken: Charge Capture section accepted

## 2023-10-05 NOTE — PROGRESS NOTES
Medical oncology follow-up visit: 10/5/2023      Referring Physician:  Cynthia Sanders M.D.  Primary Care:  Cynthia Sanders M.D.    Diagnosis: At least microinvasive ductal carcinoma    Chief Complaint: Newly diagnosed at least microinvasive ductal carcinoma    History of Presenting Illness:  Evelyn Mullins is a 48 y.o. premenopausal white female who had a routine mammogram on 2/23/2023 that showed determinate calcifications in the left breast BI-RADS 4A.  On 5/26/2023 she underwent a stereotactic biopsy of this area which showed DCIS with at least microinvasive ductal carcinoma, grade 2, ER positive greater than 90%, NC positive greater than 90%, Ki-67 less than 10%, HER2 indeterminate as there was not enough tissue to make a diagnosis.  She has seen Dr. Au in consultation and will undergo partial mastectomy and sentinel lymph node biopsy next week.  She is nulliparous with a somewhat irregular.  But no hot flashes.  No family history of breast cancer.  Genetic testing is pending.  She is extremely healthy other than hypothyroidism well controlled and runs ultramarathons.    Interval history 7/12/2023.  She underwent partial mastectomy and sentinel lymph node biopsy on 6/27/2023.  Pathology showed invasive ductal carcinoma, grade 1, with extensive intermediate to high-grade DCIS.  The invasive tumor measured 1.6 cm in greatest dimension.  Final margins were clear both invasive and noninvasive tumor.  1 lymph node was positive for isolated tumor cells in 1 lymph node was benign.  Postoperative course was unremarkable and she was back doing full exercise within a week after surgery.  HER2 is still pending.  She is seeing radiation and wishes to defer this till beginning of September due to social events that are planned.    Interval history 10/5/2023: HER2 came back 1+ negative.  Oncotype DX recurrence score was 18.  Based on this chemotherapy was not recommended and she proceeded with radiation  therapy which she tolerated very well.  She has been extremely physically active and ran [place] before radiation therapy.  Her periods have been regular and shorter than previously and she missed her most recent cycle.  She denies any significant hot flashes or night sweats however.      Past Medical History:   Diagnosis Date    Bronchitis     Cancer (HCC) 05/30/23    High cholesterol     Technically high but not being treated    Malignant neoplasm of upper-outer quadrant of left breast in female, estrogen receptor positive (HCC) 6/20/2023    Urinary incontinence     Sneezing, jumping       Past Surgical History:   Procedure Laterality Date    PB MASTECTOMY, PARTIAL Left 6/27/2023    Procedure: WIRE LOCALIZED LEFT PARTIAL MASTECTOMY, MASTOPEXY, LEFT SENTINEL LYMPH INJECTION WITH EXCISION OF AXILLARY NODES;  Surgeon: Antionette Au M.D.;  Location: SURGERY SAME DAY [facility];  Service: General    ID SUSPENSION OF BREAST Left 6/27/2023    Procedure: MASTOPEXY;  Surgeon: Antionette Au M.D.;  Location: SURGERY SAME DAY [facility];  Service: General    NODE BIOPSY SENTINEL Left 6/27/2023    Procedure: BIOPSY, LYMPH NODE, SENTINEL;  Surgeon: Antionette Au M.D.;  Location: SURGERY SAME DAY [facility];  Service: General    NO PERTINENT PAST SURGICAL HISTORY         Social History     Tobacco Use    Smoking status: Never     Passive exposure: Never    Smokeless tobacco: Never   Vaping Use    Vaping Use: Never used   Substance Use Topics    Alcohol use: Not Currently    Drug use: Not Currently     Types: Inhaled     Comment: Tried marijuana a few times        History reviewed. No pertinent family history.    Allergies as of 10/05/2023 - Reviewed 10/05/2023   Allergen Reaction Noted    Pcn [penicillins] Anaphylaxis 08/06/2016    Shellfish allergy Swelling 06/19/2023         Current Outpatient Medications:     Melatonin 1 MG/4ML Liquid, , Disp: , Rfl:     Zinc 10 MG Lozenge, , Disp: , Rfl:     Probiotic Product  (PROBIOTIC + TURMERIC EXTRACT) 400 MG Cap, , Disp: , Rfl:     glutamine 500 mg/mL oral suspension, , Disp: , Rfl:     levothyroxine (SYNTHROID) 175 MCG Tab, , Disp: , Rfl:     VITAMIN D PO, Take 2,000 Int'l Units by mouth every day., Disp: , Rfl:     MAGNESIUM PO, Take 400 mg by mouth every day., Disp: , Rfl:     spironolactone (ALDACTONE) 100 MG Tab, TAKE ONE TABLET BY MOUTH NIGHTLY (Patient taking differently: Take 100 mg by mouth every evening.), Disp: 90 Tab, Rfl: 1    Cetirizine HCl (ZYRTEC ALLERGY PO), Take 10 mg by mouth every day., Disp: , Rfl:     levothyroxine (SYNTHROID) 150 MCG Tab, Take 175 mcg by mouth every morning on an empty stomach. 5 days per week, 150 mg 2 days per week, Disp: , Rfl:     fluticasone (FLONASE) 50 MCG/ACT nasal spray, Administer 1 Spray into affected nostril(S) every day. (Patient not taking: Reported on 10/5/2023), Disp: , Rfl:     multivitamin Tab, Take 1 Tablet by mouth every day. (Patient not taking: Reported on 10/5/2023), Disp: , Rfl:     Review of Systems:  Review of Systems   Constitutional: Negative.    HENT: Negative.     Eyes: Negative.    Respiratory: Negative.     Cardiovascular: Negative.    Gastrointestinal: Negative.    Genitourinary: Negative.    Musculoskeletal: Negative.    Skin: Negative.    Neurological: Negative.    Endo/Heme/Allergies: Negative.    Psychiatric/Behavioral: Negative.            Physical Exam:      DESC; KARNOFSKY SCALE WITH ECOG EQUIVALENT: 100, Fully active, able to carry on all pre-disease performed without restriction (ECOG equivalent 0)    DISTRESS LEVEL: no acute distress    Physical Exam was not performed today      Labs:  Hospital Outpatient Visit on 05/26/2023   Component Date Value Ref Range Status    Pathology Request 05/26/2023 Sent to Histo   Final       Imaging:   All listed images below have been independently reviewed by me. I agree with the findings as summarized below:    SB-STEREOTACTIC BIOPSY LEFT INITIAL LESION    Addendum  Date: 6/5/2023    Addendum dictated on 5/30/2023 by Dr. Grace as follows: Biopsy results were discussed with the patient 5/30/2023 approximately 1545 hours. Pathologic result: FINAL DIAGNOSIS: A. Left breast, calcifications, posterior to nipple, slight superior, stereotactic biopsy: At least micro-invasive ductal carcinoma with associated DCIS. Provisional stGstrstastdstest:st st1st Tubule score: 3, Nuclear score: 2, Mitotic score: 1 Tumor Size: at least 2 mm (this limited core sample may not be reflective of the actual tumor size). Microcalcifications: Not identified Lymphovascular Invasion: Not identified Prognostic Studies: Estrogen receptors: Positive, moderate, > 90% Progesterone receptors: Positive, strong, > 90% Ki-67: < 10% tumor cells staining HER2: [pending will be attempted on block A1] Comment: This case has been reviewed by a second pathologist, Dr. Motley, who concurs with the diagnosis. The radiologic and pathologic results are concordant. Surgical consultation is recommended and that should be facilitated by her primary care physician Cynthia Sanders. She has no adverse symptoms referrable to the biopsy site.    Result Date: 6/5/2023 5/26/2023 12:39 PM HISTORY/REASON FOR EXAM:  Group of calcifications anterior superior left breast COMPARISON:  02/23/2023 TECHNIQUE/EXAM DESCRIPTION AND NUMBER OF VIEWS:  Left breast stereotactic-guided vacuum-assisted core biopsy. The procedure was discussed with the patient. Risks, benefits, and alternatives were discussed. Informed written consent was obtained. A timeout was performed. The calcifications was targeted from a CC approach. The skin was cleansed with Betadine. The overlying skin and underlying breast tissue were anesthetized with 2 mL of 1% lidocaine. A small skin incision was made. 10 mL 1% lidocaine with epinephrine was injected through the biopsy needle. 12 cores were obtained using a 9g Eviva Petite biopsy device. A Eviva hourglass clip marker was placed at biopsy  site. Calcifications are noted within the specimen. Hemostasis was obtained with direct pressure. There were no apparent complications. Following stereotactic biopsy the patient was sent to a dedicated mammography unit for mammogram of the left breast there is successful marker deployment. Some residual punctate calcifications are noted in the region.. FINDINGS:  The calcifications was targeted from a CC approach. The skin was cleansed with Betadine. The overlying skin and underlying breast tissue were anesthetized with 2 mL of 1% lidocaine. A small skin incision was made. 10 mL 1% lidocaine with epinephrine was injected through the biopsy needle. 12 cores were obtained using a 9g Eviva Petite biopsy device. A Eviva hourglass clip marker was placed at biopsy site. Calcifications are noted within the specimen. Hemostasis was obtained with direct pressure. There were no apparent complications. Following stereotactic biopsy the patient was sent to a dedicated mammography unit for mammogram of the left breast there is successful marker deployment. Some residual punctate calcifications are noted in the region..     1.  Successful stereotactic biopsy of group of calcifications superior left breast near 12:00 position. 2.  Stereotactically guided placement of biopsy marker at the biopsy site. 3.  Two-view procedure mammogram demonstrates successful marker clip deployment.        Pathology:      Assessment & Plan:    1.  At least microinvasive ductal carcinoma in a background of DCIS of the left breast, ER positive greater than 90%, DC positive greater than 90%, Ki-67 less than 10%, HER2 not able to be done based on lack of tissue.  Had partial mastectomy she had stage I cancer (pT1c, pN0i+), grade 1.  HER2 1+ negative.  Oncotype DX recurrence score 18.  To initiate tamoxifen as adjuvant endocrine therapy.    Plan: We discussed risks and benefits of tamoxifen in the setting.  She will initiate an return in 8 weeks for  routine follow-up.    Any questions and concerns raised by the patient were answered to the best of my ability. Thank you for allowing me to participate in the care for this patient. Please feel free to contact me for any questions or concerns.     Dirk Womack M.D.

## 2023-10-10 ENCOUNTER — PHYSICAL THERAPY (OUTPATIENT)
Dept: PHYSICAL THERAPY | Facility: REHABILITATION | Age: 48
End: 2023-10-10
Attending: SURGERY
Payer: COMMERCIAL

## 2023-10-10 DIAGNOSIS — L90.5 AXILLARY WEB SYNDROME: ICD-10-CM

## 2023-10-10 DIAGNOSIS — L76.82 AXILLARY WEB SYNDROME: ICD-10-CM

## 2023-10-10 PROCEDURE — 97535 SELF CARE MNGMENT TRAINING: CPT

## 2023-10-10 PROCEDURE — 97140 MANUAL THERAPY 1/> REGIONS: CPT

## 2023-10-10 NOTE — OP THERAPY DAILY TREATMENT
"  Outpatient Physical Therapy  LYMPHEDEMA THERAPY DAILY TREATMENT     [facility] [address]  [address]  [place] [address]  Phone:  [phone]  Fax:  [phone]    Date: 10/10/2023    Patient: Boogie Mullins  YOB: 1975  MRN: [id number]     Time Calculation    Start time: 0815  Stop time: 0901 Time Calculation (min): 46 minutes         Chief Complaint: Shoulder Problem    Visit #: 5    Subjective:   History of Present Illness:     Mechanism of injury:  Completed radiation last week. L UE has been really sore and she has had some interesting nerve sensations to her arm. Does have some \"ripping\" feelings to her L upper quadrant but otherwise is just very sore to her L chest. Overall, a deep soreness consistent with radiated tissue.       Lymphedema Objective    Left Upper Extremity Circumferential Measurements 8/10  Other: cm  Areola: cm  Breast Crease: cm  Axilla: 29.2 cm  Upper Proximal Humerus: 26.5 cm  Distal Humerus: 24.5 cm  Elbow: 22.4 cm  Mid-Forearm: 19.4 cm  Wrist: 14.4 cm  Distal Hatfield Crease: 17.7 cm  Total: 154.1 cm     Right Upper Extremity Circumferential Measurements 8/10  Other: cm  Areola: cm  Breast Crease: cm  Axilla: 29.1 cm  Upper Proximal Humerus: 27.5 cm  Distal Humerus: 24.8 cm  Elbow: 21.6 cm  Mid-Forearm: 20 cm  Wrist: 14.9 cm  Distal Hatfield Crease: 18.3 cm  Total: cm 156.2    Therapeutic Exercises (CPT 38870):     1. Foam Roll Sequence, HEP    2. BKFO, HEP; handout provided    3. wall mitali, HEP; handout provided    4. seated spinal extension with flexion at GHJ, HEP    6. wall angle with chin tuck and pelvic rotation    7. isometric ER with back rotation    8. backwards spider, added; handout provided    Therapeutic Treatments and Modalities:     1. Self Care ADL Training (CPT 72457), Discussed current activity level and importance of recovery. Also discussed ongoing stretching and breathing techniques.    2. Manual Therapy (CPT " 76649)    Therapeutic Treatment and Modalities Summary: -cording techniques to L axilla; hooking, traction, light compression  -ART to L subscapularis with passive abduction  -sidelying scapular mobilizations to L scapula in all planes.     Time-based treatments/modalities:    Physical Therapy Timed Treatment Charges  Functional training, self care minutes (CPT 95701): 20 minutes  Manual therapy minutes (CPT 90407): 26 minutes      Assessment and Plan:   Assessment details:  Boogie has completed radiation and is currently sustaining her L shoulder ROM in all planes. She demonstrates tenderness and deep soreness to her L radiated area which is expected at this time. She understands the importance of ongoing stretching routine and will benefit from a check in next month to ensure she sustains her current ROM and can address any signs of immobility before scarring begins.     Plan:  Therapy options:  Physical therapy treatment to continue  Discussed with:  Patient

## 2023-10-10 NOTE — OP THERAPY PROGRESS SUMMARY
Outpatient Physical Therapy  PROGRESS SUMMARY NOTE      [facility]  [address]  [address]  [place] [address]  Phone:  [phone]  Fax:  [phone]    Date of Visit: 10/10/2023    Patient: Boogie Mullins  YOB: 1975  MRN: [id number]     Referring Provider: Antionette Au M.D.  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 Cording to her L axilla appears slightly more prominent than last time, but will lessen with ongoing stretching. She demonstrates tenderness and deep soreness to her L radiated area which is expected at this time. She understands the importance of ongoing stretching routine and will benefit from a check in next month to ensure she sustains her current ROM and can address any signs of immobility before scarring begins.     Objective findings and assessment details: L shoulder ROM: full in all planes    Goals:   Short Term Goals:   1. Boogie will be able to verbalize three preventative strategies for lymphedema. Goal Met  2. Boogie will consistently perform cording exercises to reduce cord adhesions and allow for pain-free horizontal abduction and abduction at shoulder. Goal Partially Met  3. Boogie will be able to participate in yoga classes and lifting class while utilizing preventative strategies for lymphedema.Goal Met    Short term goal time span:  2-4 weeks      Long Term Goals:    1. Boogie will reduce her cord adhesions such that she will report no sensation of pull or discomfort for her L UE  during overhead reaching and horizontal abduction.Goal Not Met  2. Boogie will consistently perform preventative activity for lymphedema including sports bra and sleeve wear when appropriate. Goal Partially Met   3. Boogie will sustain her current GHJ ROM within 5degrees throughout the completion of her breast cancer treatment. Goal In Progress    Long term goal time span:  4-6 weeks    Plan:   Planned therapy interventions:  Functional Training, Self Care (CPT 36127), Manual Therapy (CPT 61312), Therapeutic Activities (CPT 93630) and Therapeutic Exercise (CPT 31859)  Frequency:  1x month  Duration in weeks:  8      Referring provider co-signature:  I have reviewed this plan of care and my co-signature certifies the need for services.     Certification Period: 10/10/2023 to 12/05/23    Physician Signature: ________________________________ Date: ______________

## 2023-10-19 ENCOUNTER — HOSPITAL ENCOUNTER (OUTPATIENT)
Dept: RADIOLOGY | Facility: MEDICAL CENTER | Age: 48
End: 2023-10-19
Attending: PHYSICAL MEDICINE & REHABILITATION
Payer: COMMERCIAL

## 2023-10-19 DIAGNOSIS — M54.16 LUMBAR RADICULOPATHY: ICD-10-CM

## 2023-10-19 DIAGNOSIS — R10.2 ADNEXAL TENDERNESS, RIGHT: ICD-10-CM

## 2023-10-19 PROCEDURE — 72148 MRI LUMBAR SPINE W/O DYE: CPT

## 2023-10-19 PROCEDURE — 72195 MRI PELVIS W/O DYE: CPT

## 2023-11-14 ASSESSMENT — LIFESTYLE VARIABLES
TOBACCO_USE: NO
SMOKING_STATUS: NO

## 2023-11-15 ENCOUNTER — PHYSICAL THERAPY (OUTPATIENT)
Dept: PHYSICAL THERAPY | Facility: REHABILITATION | Age: 48
End: 2023-11-15
Attending: SURGERY
Payer: COMMERCIAL

## 2023-11-15 DIAGNOSIS — L76.82 AXILLARY WEB SYNDROME: ICD-10-CM

## 2023-11-15 DIAGNOSIS — L90.5 AXILLARY WEB SYNDROME: ICD-10-CM

## 2023-11-15 PROCEDURE — 97535 SELF CARE MNGMENT TRAINING: CPT

## 2023-11-15 PROCEDURE — 97110 THERAPEUTIC EXERCISES: CPT

## 2023-11-15 NOTE — OP THERAPY DAILY TREATMENT
Outpatient Physical Therapy  LYMPHEDEMA THERAPY DAILY TREATMENT     [facility] [address]  [address]  [place] [address]  Phone:  [phone]  Fax:  [phone]    Date: 11/15/2023    Patient: Boogie Mullins  YOB: 1975  MRN: [id number]     Time Calculation    Start time: 0730  Stop time: 0816 Time Calculation (min): 46 minutes         Chief Complaint: Shoulder Problem    Visit #: 6    Subjective:   History of Present Illness:     Mechanism of injury:  Doing great, overall. Two weeks after she was done with radiation, she began to feel better but then at the end of October, she had a lot of pain to her L arm. She has been stretching still but the arm and axilla are both sore. Began aromatase inhibitor a month or so ago and initially had some discomfort but is feeling ok now.       Lymphedema Objective      Left Upper Extremity Circumferential Measurements  Other: cm  Areola: cm  Breast Crease: cm  Axilla: 28 cm  Upper Proximal Humerus: 25.7 cm  Distal Humerus: 24.6 cm  Elbow: 21.6 cm  Mid-Forearm: 19.6 cm  Wrist: 13.9 cm  Distal Hatfield Crease: 17.4 cm  Total: 150.8 cm    Right Upper Extremity Circumferential Measurements  Other: cm  Areola: cm  Breast Crease: cm  Axilla: 27 cm  Upper Proximal Humerus: 26.8 cm  Distal Humerus: 25.1 cm  Elbow: 21.7 cm  Mid-Forearm: 21.1 cm  Wrist: 14.7 cm  Distal Hatfield Crease: 18.1 cm  Total: cm 154.5          Left Upper Extremity Circumferential Measurements EVAL  Axilla: 29.2 cm  Upper Proximal Humerus: 26.5 cm  Distal Humerus: 24.5 cm  Elbow: 22.4 cm  Mid-Forearm: 19.4 cm  Wrist: 14.4 cm  Distal [place] Crease: 17.7 cm  Total: 154.1 cm     Right Upper Extremity Circumferential Measurements EVAL  Axilla: 29.1 cm  Upper Proximal Humerus: 27.5 cm  Distal Humerus: 24.8 cm  Elbow: 21.6 cm  Mid-Forearm: 20 cm  Wrist: 14.9 cm  Distal Hatfield Crease: 18.3 cm  Total: cm 156.2    Therapeutic Exercises (CPT 17193):     1. Foam Roll Sequence,  HEP    2. BKFO, HEP; handout provided    3. wall mitali, HEP; handout provided    4. seated spinal extension with flexion at GHJ, HEP    6. wall angle with chin tuck and pelvic rotation    7. isometric ER with back rotation    8. backwards spider, HEP    9. wall climb with lift off, x10, added to HEP    10. infraspinatus push off, x10, added to HEP    Therapeutic Treatments and Modalities:     1. Self Care ADL Training (CPT 81926), Discussed body feeling and post-radiation effects. Encouraged Boogie to continue with all her stretches as well as whichever level of activity she would like to pursue.    Time-based treatments/modalities:    Physical Therapy Timed Treatment Charges  Functional training, self care minutes (CPT 32409): 33 minutes  Therapeutic exercise minutes (CPT 22718): 13 minutes      Assessment and Plan:   Assessment details:  Boogie has now completed radiation and is on a five year aromatase inhibitor. She has full ROM to her L GHJ and understands the importance of ongoing stretches to mitigate ill effects of radiation that may occur. At this time, Boogie does not require further intervention and will be DC'd.     Plan:  Therapy options:  No further treatment needed  Discussed with:  Patient

## 2023-11-16 ENCOUNTER — HOSPITAL ENCOUNTER (OUTPATIENT)
Dept: RADIATION ONCOLOGY | Facility: MEDICAL CENTER | Age: 48
End: 2023-11-30
Attending: RADIOLOGY
Payer: COMMERCIAL

## 2023-11-16 NOTE — OP THERAPY DISCHARGE SUMMARY
Outpatient Physical Therapy  DISCHARGE SUMMARY NOTE      [facility] Therapy [facility]  [address]  [address]  [place] [address]  Phone:  [phone]  Fax:  [phone]    Date of Visit: 11/15/2023    Patient: Boogie Mullins  YOB: 1975  MRN: [id number]     Referring Provider: Antionette Au M.D.  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 She has now completed radiation and has sustained her full ROM. At this time, Boogie will be DC'd as she understands importance of lymphedema risk reduction as well as maintaining ROM.     Thank you for the referral,    Amirah Tim, PT, DPT, CLT    Date: 11/16/2023

## 2023-11-16 NOTE — PROGRESS NOTES
Telephone Appointment Visit   This telephone visit was initiated by the patient and they verbally consented.    Reason for Call:  Symptom Follow-up    HPI:    Stage IA ([id number]) G1 invasive ductal carcinoma of the left upper outer quadrant of breast ER/UT positive HER2/adalberto negative with a Ki-67 of less than 10% status post lumpectomy and sentinel lymph node biopsy on 6/27/2023 Oncotype score 18, completing whole breast radiotherapy with a boost to 5005 cGy in October 2023, taking tamoxifen.      Patient states after completing radiation she did have some slight increase in her erythema.  This was most notable in the mid axillary line.  She now feels she is back at her baseline.  She is tolerating her tamoxifen well.    Labs / Images Reviewed:   None    Assessment and Plan:     While patient continues to follow in medical oncology I will release her from active follow-up in radiation oncology.  She does know if she has any questions or concerns at any time she should feel free to contact me.    Follow-up: As needed    Total Time Spent (mins): 10    Adi Macias M.D.

## 2023-12-06 ENCOUNTER — TELEPHONE (OUTPATIENT)
Dept: SURGERY | Facility: MEDICAL CENTER | Age: 48
End: 2023-12-06
Payer: COMMERCIAL

## 2023-12-06 NOTE — TELEPHONE ENCOUNTER
Pt called as directed at last appointment for SAF-2 in February to follow up 06/2023 left partial mastectomy. She does not have any questions or concerns that she would like to discuss and has opted out of this appointment. I encouraged her to call back if she develops and questions or concerns in the future.

## 2023-12-07 ENCOUNTER — APPOINTMENT (RX ONLY)
Dept: URBAN - METROPOLITAN AREA CLINIC 6 | Facility: CLINIC | Age: 48
Setting detail: DERMATOLOGY
End: 2023-12-07

## 2023-12-07 DIAGNOSIS — Z71.89 OTHER SPECIFIED COUNSELING: ICD-10-CM

## 2023-12-07 DIAGNOSIS — D22 MELANOCYTIC NEVI: ICD-10-CM

## 2023-12-07 DIAGNOSIS — L70.0 ACNE VULGARIS: ICD-10-CM

## 2023-12-07 DIAGNOSIS — L81.4 OTHER MELANIN HYPERPIGMENTATION: ICD-10-CM

## 2023-12-07 DIAGNOSIS — L82.1 OTHER SEBORRHEIC KERATOSIS: ICD-10-CM

## 2023-12-07 DIAGNOSIS — D18.0 HEMANGIOMA: ICD-10-CM

## 2023-12-07 DIAGNOSIS — L71.8 OTHER ROSACEA: ICD-10-CM

## 2023-12-07 PROBLEM — D18.01 HEMANGIOMA OF SKIN AND SUBCUTANEOUS TISSUE: Status: ACTIVE | Noted: 2023-12-07

## 2023-12-07 PROBLEM — D22.5 MELANOCYTIC NEVI OF TRUNK: Status: ACTIVE | Noted: 2023-12-07

## 2023-12-07 PROCEDURE — ? PRESCRIPTION MEDICATION MANAGEMENT

## 2023-12-07 PROCEDURE — 99214 OFFICE O/P EST MOD 30 MIN: CPT

## 2023-12-07 PROCEDURE — ? PRESCRIPTION

## 2023-12-07 PROCEDURE — ? SUNSCREEN RECOMMENDATIONS

## 2023-12-07 PROCEDURE — ? ADDITIONAL NOTES

## 2023-12-07 PROCEDURE — ? COUNSELING

## 2023-12-07 PROCEDURE — ? SUNSCREEN TREATMENT REGIMEN

## 2023-12-07 RX ORDER — SPIRONOLACTONE 50 MG/1
2 TABLET, FILM COATED ORAL QHS
Qty: 180 | Refills: 3 | Status: ERX

## 2023-12-07 RX ORDER — BENZOYL PEROXIDE 50 MG/G
CREAM TOPICAL QD
Qty: 30 | Refills: 6 | Status: ERX | COMMUNITY
Start: 2023-12-07

## 2023-12-07 RX ADMIN — BENZOYL PEROXIDE: 50 CREAM TOPICAL at 00:00

## 2023-12-07 ASSESSMENT — LOCATION ZONE DERM
LOCATION ZONE: NOSE
LOCATION ZONE: HAND
LOCATION ZONE: TRUNK
LOCATION ZONE: FACE

## 2023-12-07 ASSESSMENT — LOCATION SIMPLE DESCRIPTION DERM
LOCATION SIMPLE: CHEST
LOCATION SIMPLE: NOSE
LOCATION SIMPLE: ABDOMEN
LOCATION SIMPLE: LEFT HAND
LOCATION SIMPLE: LEFT CHEEK
LOCATION SIMPLE: RIGHT HAND
LOCATION SIMPLE: INFERIOR FOREHEAD
LOCATION SIMPLE: RIGHT BREAST

## 2023-12-07 ASSESSMENT — LOCATION DETAILED DESCRIPTION DERM
LOCATION DETAILED: RIGHT MEDIAL SUPERIOR CHEST
LOCATION DETAILED: LEFT RADIAL DORSAL HAND
LOCATION DETAILED: LEFT ULNAR DORSAL HAND
LOCATION DETAILED: RIGHT RADIAL DORSAL HAND
LOCATION DETAILED: NASAL SUPRATIP
LOCATION DETAILED: LEFT INFERIOR MEDIAL MALAR CHEEK
LOCATION DETAILED: PERIUMBILICAL SKIN
LOCATION DETAILED: RIGHT ULNAR DORSAL HAND
LOCATION DETAILED: INFERIOR MID FOREHEAD
LOCATION DETAILED: EPIGASTRIC SKIN
LOCATION DETAILED: RIGHT MEDIAL BREAST 4-5:00 REGION

## 2023-12-07 NOTE — PROCEDURE: PRESCRIPTION MEDICATION MANAGEMENT
Detail Level: Zone
Initiate Treatment: Epsolay 5 % topical cream QD
Render In Strict Bullet Format?: No
Samples Given: Epsolay
Plan: Advised patient can taper off medication if remains clear, RTC if flares/not well controlled with current RX
Continue Regimen: Spironolactone 100mg Qhs

## 2023-12-07 NOTE — PROCEDURE: COUNSELING
Detail Level: Generalized
Detail Level: Zone
Dapsone Pregnancy And Lactation Text: This medication is Pregnancy Category C and is not considered safe during pregnancy or breast feeding.
Azithromycin Counseling:  I discussed with the patient the risks of azithromycin including but not limited to GI upset, allergic reaction, drug rash, diarrhea, and yeast infections.
High Dose Vitamin A Counseling: Side effects reviewed, pt to contact office should one occur.
Doxycycline Pregnancy And Lactation Text: This medication is Pregnancy Category D and not consider safe during pregnancy. It is also excreted in breast milk but is considered safe for shorter treatment courses.
Minocycline Counseling: Patient advised regarding possible photosensitivity and discoloration of the teeth, skin, lips, tongue and gums.  Patient instructed to avoid sunlight, if possible.  When exposed to sunlight, patients should wear protective clothing, sunglasses, and sunscreen.  The patient was instructed to call the office immediately if the following severe adverse effects occur:  hearing changes, easy bruising/bleeding, severe headache, or vision changes.  The patient verbalized understanding of the proper use and possible adverse effects of minocycline.  All of the patient's questions and concerns were addressed.
Tetracycline Pregnancy And Lactation Text: This medication is Pregnancy Category D and not consider safe during pregnancy. It is also excreted in breast milk.
Topical Clindamycin Counseling: Patient counseled that this medication may cause skin irritation or allergic reactions.  In the event of skin irritation, the patient was advised to reduce the amount of the drug applied or use it less frequently.   The patient verbalized understanding of the proper use and possible adverse effects of clindamycin.  All of the patient's questions and concerns were addressed.
Use Enhanced Medication Counseling?: No
Benzoyl Peroxide Pregnancy And Lactation Text: This medication is Pregnancy Category C. It is unknown if benzoyl peroxide is excreted in breast milk.
Topical Sulfur Applications Counseling: Topical Sulfur Counseling: Patient counseled that this medication may cause skin irritation or allergic reactions.  In the event of skin irritation, the patient was advised to reduce the amount of the drug applied or use it less frequently.   The patient verbalized understanding of the proper use and possible adverse effects of topical sulfur application.  All of the patient's questions and concerns were addressed.
Bactrim Counseling:  I discussed with the patient the risks of sulfa antibiotics including but not limited to GI upset, allergic reaction, drug rash, diarrhea, dizziness, photosensitivity, and yeast infections.  Rarely, more serious reactions can occur including but not limited to aplastic anemia, agranulocytosis, methemoglobinemia, blood dyscrasias, liver or kidney failure, lung infiltrates or desquamative/blistering drug rashes.
Erythromycin Pregnancy And Lactation Text: This medication is Pregnancy Category B and is considered safe during pregnancy. It is also excreted in breast milk.
Sarecycline Counseling: Patient advised regarding possible photosensitivity and discoloration of the teeth, skin, lips, tongue and gums.  Patient instructed to avoid sunlight, if possible.  When exposed to sunlight, patients should wear protective clothing, sunglasses, and sunscreen.  The patient was instructed to call the office immediately if the following severe adverse effects occur:  hearing changes, easy bruising/bleeding, severe headache, or vision changes.  The patient verbalized understanding of the proper use and possible adverse effects of sarecycline.  All of the patient's questions and concerns were addressed.
Birth Control Pills Counseling: Birth Control Pill Counseling: I discussed with the patient the potential side effects of OCPs including but not limited to increased risk of stroke, heart attack, thrombophlebitis, deep venous thrombosis, hepatic adenomas, breast changes, GI upset, headaches, and depression.  The patient verbalized understanding of the proper use and possible adverse effects of OCPs. All of the patient's questions and concerns were addressed.
Topical Retinoid Pregnancy And Lactation Text: This medication is Pregnancy Category C. It is unknown if this medication is excreted in breast milk.
Spironolactone Counseling: Patient advised regarding risks of diarrhea, abdominal pain, hyperkalemia, birth defects (for female patients), liver toxicity and renal toxicity. The patient may need blood work to monitor liver and kidney function and potassium levels while on therapy. The patient verbalized understanding of the proper use and possible adverse effects of spironolactone.  All of the patient's questions and concerns were addressed.
Isotretinoin Pregnancy And Lactation Text: This medication is Pregnancy Category X and is considered extremely dangerous during pregnancy. It is unknown if it is excreted in breast milk.
Dapsone Counseling: I discussed with the patient the risks of dapsone including but not limited to hemolytic anemia, agranulocytosis, rashes, methemoglobinemia, kidney failure, peripheral neuropathy, headaches, GI upset, and liver toxicity.  Patients who start dapsone require monitoring including baseline LFTs and weekly CBCs for the first month, then every month thereafter.  The patient verbalized understanding of the proper use and possible adverse effects of dapsone.  All of the patient's questions and concerns were addressed.
Tazorac Pregnancy And Lactation Text: This medication is not safe during pregnancy. It is unknown if this medication is excreted in breast milk.
High Dose Vitamin A Pregnancy And Lactation Text: High dose vitamin A therapy is contraindicated during pregnancy and breast feeding.
Tetracycline Counseling: Patient counseled regarding possible photosensitivity and increased risk for sunburn.  Patient instructed to avoid sunlight, if possible.  When exposed to sunlight, patients should wear protective clothing, sunglasses, and sunscreen.  The patient was instructed to call the office immediately if the following severe adverse effects occur:  hearing changes, easy bruising/bleeding, severe headache, or vision changes.  The patient verbalized understanding of the proper use and possible adverse effects of tetracycline.  All of the patient's questions and concerns were addressed. Patient understands to avoid pregnancy while on therapy due to potential birth defects.
Topical Clindamycin Pregnancy And Lactation Text: This medication is Pregnancy Category B and is considered safe during pregnancy. It is unknown if it is excreted in breast milk.
Doxycycline Counseling:  Patient counseled regarding possible photosensitivity and increased risk for sunburn.  Patient instructed to avoid sunlight, if possible.  When exposed to sunlight, patients should wear protective clothing, sunglasses, and sunscreen.  The patient was instructed to call the office immediately if the following severe adverse effects occur:  hearing changes, easy bruising/bleeding, severe headache, or vision changes.  The patient verbalized understanding of the proper use and possible adverse effects of doxycycline.  All of the patient's questions and concerns were addressed.
Azithromycin Pregnancy And Lactation Text: This medication is considered safe during pregnancy and is also secreted in breast milk.
Erythromycin Counseling:  I discussed with the patient the risks of erythromycin including but not limited to GI upset, allergic reaction, drug rash, diarrhea, increase in liver enzymes, and yeast infections.
Benzoyl Peroxide Counseling: Patient counseled that medicine may cause skin irritation and bleach clothing.  In the event of skin irritation, the patient was advised to reduce the amount of the drug applied or use it less frequently.   The patient verbalized understanding of the proper use and possible adverse effects of benzoyl peroxide.  All of the patient's questions and concerns were addressed.
Topical Retinoid counseling:  Patient advised to apply a pea-sized amount only at bedtime and wait 30 minutes after washing their face before applying.  If too drying, patient may add a non-comedogenic moisturizer. The patient verbalized understanding of the proper use and possible adverse effects of retinoids.  All of the patient's questions and concerns were addressed.
Topical Sulfur Applications Pregnancy And Lactation Text: This medication is Pregnancy Category C and has an unknown safety profile during pregnancy. It is unknown if this topical medication is excreted in breast milk.
Bactrim Pregnancy And Lactation Text: This medication is Pregnancy Category D and is known to cause fetal risk.  It is also excreted in breast milk.
Birth Control Pills Pregnancy And Lactation Text: This medication should be avoided if pregnant and for the first 30 days post-partum.
Isotretinoin Counseling: Patient should get monthly blood tests, not donate blood, not drive at night if vision affected, not share medication, and not undergo elective surgery for 6 months after tx completed. Side effects reviewed, pt to contact office should one occur.
Tazorac Counseling:  Patient advised that medication is irritating and drying.  Patient may need to apply sparingly and wash off after an hour before eventually leaving it on overnight.  The patient verbalized understanding of the proper use and possible adverse effects of tazorac.  All of the patient's questions and concerns were addressed.
Spironolactone Pregnancy And Lactation Text: This medication can cause feminization of the male fetus and should be avoided during pregnancy. The active metabolite is also found in breast milk.

## 2023-12-14 ENCOUNTER — HOSPITAL ENCOUNTER (OUTPATIENT)
Dept: HEMATOLOGY ONCOLOGY | Facility: MEDICAL CENTER | Age: 48
End: 2023-12-14
Attending: INTERNAL MEDICINE
Payer: COMMERCIAL

## 2023-12-14 VITALS
HEIGHT: 63 IN | HEART RATE: 85 BPM | DIASTOLIC BLOOD PRESSURE: 60 MMHG | BODY MASS INDEX: 23.44 KG/M2 | WEIGHT: 132.28 LBS | SYSTOLIC BLOOD PRESSURE: 100 MMHG | OXYGEN SATURATION: 100 % | RESPIRATION RATE: 14 BRPM | TEMPERATURE: 98.1 F

## 2023-12-14 DIAGNOSIS — C50.412 MALIGNANT NEOPLASM OF UPPER-OUTER QUADRANT OF LEFT BREAST IN FEMALE, ESTROGEN RECEPTOR POSITIVE (HCC): ICD-10-CM

## 2023-12-14 DIAGNOSIS — Z17.0 MALIGNANT NEOPLASM OF UPPER-OUTER QUADRANT OF LEFT BREAST IN FEMALE, ESTROGEN RECEPTOR POSITIVE (HCC): ICD-10-CM

## 2023-12-14 PROCEDURE — 99212 OFFICE O/P EST SF 10 MIN: CPT | Performed by: INTERNAL MEDICINE

## 2023-12-14 PROCEDURE — 99213 OFFICE O/P EST LOW 20 MIN: CPT | Performed by: INTERNAL MEDICINE

## 2023-12-14 RX ORDER — BENZOYL PEROXIDE 50 MG/G
CREAM TOPICAL
COMMUNITY
Start: 2023-12-07

## 2023-12-14 RX ORDER — OXYBUTYNIN CHLORIDE 5 MG/1
5 TABLET, EXTENDED RELEASE ORAL DAILY
Qty: 30 TABLET | Refills: 5 | Status: SHIPPED | OUTPATIENT
Start: 2023-12-14

## 2023-12-14 ASSESSMENT — ENCOUNTER SYMPTOMS
NEUROLOGICAL NEGATIVE: 1
MUSCULOSKELETAL NEGATIVE: 1
RESPIRATORY NEGATIVE: 1
CARDIOVASCULAR NEGATIVE: 1
EYES NEGATIVE: 1
GASTROINTESTINAL NEGATIVE: 1
PSYCHIATRIC NEGATIVE: 1
CONSTITUTIONAL NEGATIVE: 1

## 2023-12-14 ASSESSMENT — PAIN SCALES - GENERAL: PAINLEVEL: 3=SLIGHT PAIN

## 2023-12-14 NOTE — ADDENDUM NOTE
Encounter addended by: Debra Benitez, Med Ass't on: 12/14/2023 11:30 AM   Actions taken: Charge Capture section accepted

## 2023-12-14 NOTE — ADDENDUM NOTE
Encounter addended by: Jose Miguel Fields, Med Ass't on: 12/14/2023 12:02 PM   Actions taken: Charge Capture section accepted

## 2023-12-14 NOTE — PROGRESS NOTES
Medical oncology follow-up visit:      Referring Physician:  Cynthia Sanders M.D.  Primary Care:  Cynthia Sanders M.D.    Diagnosis: At least microinvasive ductal carcinoma    Chief Complaint: Newly diagnosed at least microinvasive ductal carcinoma    History of Presenting Illness:  Evelyn Mullins is a 48 y.o. premenopausal white female who had a routine mammogram on 2/23/2023 that showed indeterminate calcifications in the left breast BI-RADS 4A.  On 5/26/2023 she underwent a stereotactic biopsy of this area which showed DCIS with at least microinvasive ductal carcinoma, grade 2, ER positive greater than 90%, MN positive greater than 90%, Ki-67 less than 10%, HER2 indeterminate as there was not enough tissue to make a diagnosis.  She has seen Dr. Au in consultation and will undergo partial mastectomy and sentinel lymph node biopsy next week.  She is nulliparous with a somewhat irregular.  But no hot flashes.  No family history of breast cancer.  Genetic testing is pending.  She is extremely healthy other than hypothyroidism well controlled and runs ultramarathons.    Interval history 7/12/2023.  She underwent partial mastectomy and sentinel lymph node biopsy on 6/27/2023.  Pathology showed invasive ductal carcinoma, grade 1, with extensive intermediate to high-grade DCIS.  The invasive tumor measured 1.6 cm in greatest dimension.  Final margins were clear both invasive and noninvasive tumor.  1 lymph node was positive for isolated tumor cells in 1 lymph node was benign.  Postoperative course was unremarkable and she was back doing full exercise within a week after surgery.  HER2 is still pending.  She is seeing radiation and wishes to defer this till beginning of September due to social events that are planned.    Interval history 10/5/2023: HER2 came back 1+ negative.  Oncotype DX recurrence score was 18.  Based on this chemotherapy was not recommended and she proceeded with radiation therapy  which she tolerated very well.  She has been extremely physically active and ran [employer] before radiation therapy.  Her periods have been regular and shorter than previously and she missed her most recent cycle.  She denies any significant hot flashes or night sweats however.    Interval history 12/14/2023: She started tamoxifen in October 2023.  She has a feeling of being very hot at night which requires her to remove all her covers and then gets cold.  This happens most nights and is interrupting her sleep.  She does not have hot flashes during the day.  She had a vaginal discharge in the first few weeks and this has abated.  Otherwise she has no symptoms referable to tamoxifen.  Of note she has not had a period in several months now.      Past Medical History:   Diagnosis Date    Bronchitis     Cancer (HCC) 05/30/23    High cholesterol     Technically high but not being treated    Malignant neoplasm of upper-outer quadrant of left breast in female, estrogen receptor positive (HCC) 6/20/2023    Urinary incontinence     Sneezing, jumping       Past Surgical History:   Procedure Laterality Date    PB MASTECTOMY, PARTIAL Left 6/27/2023    Procedure: WIRE LOCALIZED LEFT PARTIAL MASTECTOMY, MASTOPEXY, LEFT SENTINEL LYMPH INJECTION WITH EXCISION OF AXILLARY NODES;  Surgeon: Antionette Au M.D.;  Location: SURGERY SAME DAY [facility];  Service: General    [place] SUSPENSION OF BREAST Left 6/27/2023    Procedure: MASTOPEXY;  Surgeon: Antionette Au M.D.;  Location: SURGERY SAME DAY [facility];  Service: General    NODE BIOPSY SENTINEL Left 6/27/2023    Procedure: BIOPSY, LYMPH NODE, SENTINEL;  Surgeon: Antionette Au M.D.;  Location: SURGERY SAME DAY [facility];  Service: General    NO PERTINENT PAST SURGICAL HISTORY         Social History     Tobacco Use    Smoking status: Never     Passive exposure: Never    Smokeless tobacco: Never   Vaping Use    Vaping Use: Never used   Substance Use Topics    Alcohol use: Not  Currently    Drug use: Not Currently     Types: Inhaled     Comment: Tried marijuana a few times        History reviewed. No pertinent family history.    Allergies as of 12/14/2023 - Reviewed 12/14/2023   Allergen Reaction Noted    Pcn [penicillins] Anaphylaxis 08/06/2016    Shellfish allergy Swelling 06/19/2023         Current Outpatient Medications:     EPSOLAY 5 % cream, , Disp: , Rfl:     Melatonin 1 MG/4ML Liquid, , Disp: , Rfl:     Probiotic Product (PROBIOTIC + TURMERIC EXTRACT) 400 MG Cap, , Disp: , Rfl:     glutamine 500 mg/mL oral suspension, , Disp: , Rfl:     tamoxifen (NOLVADEX) 20 MG tablet, Take 1 Tablet by mouth every day., Disp: 60 Tablet, Rfl: 3    levothyroxine (SYNTHROID) 175 MCG Tab, , Disp: , Rfl:     fluticasone (FLONASE) 50 MCG/ACT nasal spray, Administer 1 Spray into affected nostril(S) every day., Disp: , Rfl:     VITAMIN D PO, Take 2,000 Int'l Units by mouth every day., Disp: , Rfl:     MAGNESIUM PO, Take 400 mg by mouth every day., Disp: , Rfl:     multivitamin Tab, Take 1 Tablet by mouth every day., Disp: , Rfl:     spironolactone (ALDACTONE) 100 MG Tab, TAKE ONE TABLET BY MOUTH NIGHTLY (Patient taking differently: Take 100 mg by mouth every evening.), Disp: 90 Tab, Rfl: 1    Cetirizine HCl (ZYRTEC ALLERGY PO), Take 10 mg by mouth every day., Disp: , Rfl:     levothyroxine (SYNTHROID) 150 MCG Tab, Take 175 mcg by mouth every morning on an empty stomach. 5 days per week, 150 mg 2 days per week, Disp: , Rfl:     Zinc 10 MG Lozenge, , Disp: , Rfl:     Review of Systems:  Review of Systems   Constitutional: Negative.    HENT: Negative.     Eyes: Negative.    Respiratory: Negative.     Cardiovascular: Negative.    Gastrointestinal: Negative.    Genitourinary: Negative.    Musculoskeletal: Negative.    Skin: Negative.    Neurological: Negative.    Endo/Heme/Allergies: Negative.    Psychiatric/Behavioral: Negative.            Physical Exam:      DESC; KARNOFSKY SCALE WITH ECOG EQUIVALENT: 100,  Fully active, able to carry on all pre-disease performed without restriction (ECOG equivalent 0)    DISTRESS LEVEL: no acute distress    Physical Exam was not performed today      Labs:  Hospital Outpatient Visit on 05/26/2023   Component Date Value Ref Range Status    Pathology Request 05/26/2023 Sent to Histo   Final       Imaging:   All listed images below have been independently reviewed by me. I agree with the findings as summarized below:    SB-STEREOTACTIC BIOPSY LEFT INITIAL LESION    Addendum Date: 6/5/2023    Addendum dictated on 5/30/2023 by Dr. Grace as follows: Biopsy results were discussed with the patient 5/30/2023 approximately 1545 hours. Pathologic result: FINAL DIAGNOSIS: A. Left breast, calcifications, posterior to nipple, slight superior, stereotactic biopsy: At least micro-invasive ductal carcinoma with associated DCIS. Provisional rdGrdrrdarddrderd:rd rd3rd Tubule score: 3, Nuclear score: 2, Mitotic score: 1 Tumor Size: at least 2 mm (this limited core sample may not be reflective of the actual tumor size). Microcalcifications: Not identified Lymphovascular Invasion: Not identified Prognostic Studies: Estrogen receptors: Positive, moderate, > 90% Progesterone receptors: Positive, strong, > 90% Ki-67: < 10% tumor cells staining HER2: [pending will be attempted on block A1] Comment: This case has been reviewed by a second pathologist, Dr. Motley, who concurs with the diagnosis. The radiologic and pathologic results are concordant. Surgical consultation is recommended and that should be facilitated by her primary care physician Cynthia Sanders. She has no adverse symptoms referrable to the biopsy site.    Result Date: 6/5/2023 5/26/2023 12:39 PM HISTORY/REASON FOR EXAM:  Group of calcifications anterior superior left breast COMPARISON:  02/23/2023 TECHNIQUE/EXAM DESCRIPTION AND NUMBER OF VIEWS:  Left breast stereotactic-guided vacuum-assisted core biopsy. The procedure was discussed with the patient. Risks,  benefits, and alternatives were discussed. Informed written consent was obtained. A timeout was performed. The calcifications was targeted from a CC approach. The skin was cleansed with Betadine. The overlying skin and underlying breast tissue were anesthetized with 2 mL of 1% lidocaine. A small skin incision was made. 10 mL 1% lidocaine with epinephrine was injected through the biopsy needle. 12 cores were obtained using a 9g Eviva Petite biopsy device. A Eviva hourglass clip marker was placed at biopsy site. Calcifications are noted within the specimen. Hemostasis was obtained with direct pressure. There were no apparent complications. Following stereotactic biopsy the patient was sent to a dedicated mammography unit for mammogram of the left breast there is successful marker deployment. Some residual punctate calcifications are noted in the region.. FINDINGS:  The calcifications was targeted from a CC approach. The skin was cleansed with Betadine. The overlying skin and underlying breast tissue were anesthetized with 2 mL of 1% lidocaine. A small skin incision was made. 10 mL 1% lidocaine with epinephrine was injected through the biopsy needle. 12 cores were obtained using a 9g Eviva Petite biopsy device. A Eviva hourglass clip marker was placed at biopsy site. Calcifications are noted within the specimen. Hemostasis was obtained with direct pressure. There were no apparent complications. Following stereotactic biopsy the patient was sent to a dedicated mammography unit for mammogram of the left breast there is successful marker deployment. Some residual punctate calcifications are noted in the region..     1.  Successful stereotactic biopsy of group of calcifications superior left breast near 12:00 position. 2.  Stereotactically guided placement of biopsy marker at the biopsy site. 3.  Two-view procedure mammogram demonstrates successful marker clip deployment.        Pathology:      Assessment & Plan:    1.  At  least microinvasive ductal carcinoma in a background of DCIS of the left breast, ER positive greater than 90%, WI positive greater than 90%, Ki-67 less than 10%, HER2 not able to be done based on lack of tissue.  Had partial mastectomy she had stage I cancer (pT1c, pN0i+), grade 1.  HER2 1+ negative.  Oncotype DX recurrence score 18.  On tamoxifen since October 2023.  2.  Moderate vasomotor symptoms to initiate oxybutynin  3 longstanding stress incontinence oxybutynin may help this as well.    Plan: Continue tamoxifen.  Will see her back in May after diagnostic bilateral mammograms.  Will initiate oxybutynin controlled release 5 mg nightly.    Any questions and concerns raised by the patient were answered to the best of my ability. Thank you for allowing me to participate in the care for this patient. Please feel free to contact me for any questions or concerns.     Dirk Womack M.D.

## 2024-02-05 ENCOUNTER — PHYSICAL THERAPY (OUTPATIENT)
Dept: PHYSICAL THERAPY | Facility: REHABILITATION | Age: 49
End: 2024-02-05
Attending: FAMILY MEDICINE
Payer: COMMERCIAL

## 2024-02-05 DIAGNOSIS — I97.2 POSTMASTECTOMY LYMPHEDEMA SYNDROME: ICD-10-CM

## 2024-02-05 PROCEDURE — 97110 THERAPEUTIC EXERCISES: CPT

## 2024-02-05 PROCEDURE — 97162 PT EVAL MOD COMPLEX 30 MIN: CPT

## 2024-02-05 NOTE — OP THERAPY EVALUATION
"  Outpatient Physical Therapy  LYMPHEDEMA THERAPY INITIAL EVALUATION    [facility] Physical Therapy [address]  [address]  [facility] [address]  Phone:  [phone]  Fax:  [phone]    Date of Evaluation: 02/05/2024    Patient: Boogie Mullins  YOB: 1975  MRN: [id number]     Referring Provider: Cynthia Sanders M.D.  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 Postmastectomy lymphedema syndrome  I97.2       Subjective:   History of Present Illness:     Mechanism of injury:  Boogie is known to this therapist from last year for basic breast cancer rehab and lymphedema risk reduction education. She has a hx of \"Stage IA ([id number]) G1 invasive ductal carcinoma of the left upper outer quadrant of breast ER/MO positive HER2/adalberto negative with a Ki-67 of less than 10% status post lumpectomy and sentinel lymph node biopsy on 6/27/2023.\" Completed radiation 10/3/23.   Breast is sore. When she is stationary, she does not notice anything. Does notice some soreness during skiing and running. She is most sore during a posterior reach with ER. Has a sequence of stretches she does nightly and feels as though each time it feels like she has never stretched. In December, she began cross country skiing and also went on a vacation where she skied daily and then she noted swelling to her breast. She has noted it has reduced some improvement.       Past Medical History:   Diagnosis Date    Bronchitis     Cancer (HCC) 05/30/23    High cholesterol     Technically high but not being treated    Malignant neoplasm of upper-outer quadrant of left breast in female, estrogen receptor positive (HCC) 6/20/2023    Urinary incontinence     Sneezing, jumping "     Past Surgical History:   Procedure Laterality Date    PB MASTECTOMY, PARTIAL Left 6/27/2023    Procedure: WIRE LOCALIZED LEFT PARTIAL MASTECTOMY, MASTOPEXY, LEFT SENTINEL LYMPH INJECTION WITH EXCISION OF AXILLARY NODES;  Surgeon: Antionette Au M.D.;  Location: SURGERY SAME DAY [facility];  Service: General    SD SUSPENSION OF BREAST Left 6/27/2023    Procedure: MASTOPEXY;  Surgeon: Antionette Au M.D.;  Location: SURGERY SAME DAY [facility];  Service: General    NODE BIOPSY SENTINEL Left 6/27/2023    Procedure: BIOPSY, LYMPH NODE, SENTINEL;  Surgeon: Antionette Au M.D.;  Location: SURGERY SAME DAY [facility];  Service: General    NO PERTINENT PAST SURGICAL HISTORY       Social History     Tobacco Use    Smoking status: Never     Passive exposure: Never    Smokeless tobacco: Never   Substance Use Topics    Alcohol use: Not Currently     Family and Occupational History     Socioeconomic History    Marital status: Single     Spouse name: Not on file    Number of children: Not on file    Years of education: Not on file    Highest education level: Not on file   Occupational History    Not on file       Lymphedema Objective      Other Notes  Areola 92.2  Inferior areola 92.3        Therapeutic Exercises (CPT 63955):     1. supine mitali, x10, added to HEP with handout    2. prone mitali, x10, added to HEP with handout    3. backwards spider, x2 excellent range; no need to pursue.    Therapeutic Treatments and Modalities:     Therapeutic Treatment and Modalities Summary: -Patient was educated in regular, self MLD of left breast extremity utilizing axillary lymph nodes to the right and inguinal lymph nodes to the left with associated pathways.  Correct strokes were emphasized and handout was provided.  Patient was invited to return for in clinic, skilled physical therapist administered MLD for which patient will set an additional appointment.   The purpose of Manual Lymph Drainage (MLD) is to reduce lymph volume in the  affected limb by increasing intake of lymphatic load into the lymphatic system, increasing the volume of transported lymph fluid, moving lymph fluid in superficial lymph vessels to collateral lymph collectors, anastomoses, or tissue channels, and increasing venous return. The goal of MLD is to re-route the lymph flow around blocked areas into more centrally located healthy lymph vessels, which drain into the venous system.      -applied Komprex II foam to L breast/axilla to facilitate drainage from L upper quadrant    Time-based treatments/modalities:    Physical Therapy Timed Treatment Charges  Therapeutic exercise minutes (CPT 46385): 16 minutes      Assessment and Plan:   Functional Impairments: lacks appropriate home exercise program and swelling    Assessment details:  Boogie is a 50yo F who underwent treatment for breast cancer last year and this past December noted an increase in discomfort to her L axilla and arm as well as swelling to her L breast/inferior axilla area. She arrives today with visually full L lateral breast. She has been instructed on self-lymphatic drainage and was given foam to apply for increased compression/fluid movement. Suspect some of her discomfort at her L breast and chest to be possibly related to tissue shortening and/or radiation fibrosis; could also be nerve irritation. Subscapularis in particular is palpably taut and uncomfortable during ART. She will benefit from skilled intervention to reduce L breast and to improve her rotational ROM at her L shoulder.   Prognosis: fair      Goals:   Short Term Goals:  1. Boogie will have a reduction in chest circumference of 1cm in order to ensure fluid movement from the L upper quadrant.   2. Boogie will be able to reach behind her at will without sensation of shooting pain to L chest/arm.  3. Boogie will be able to perform an average distance (for her) run without sensations of soreness or pain to her L upper quadrant/chest.   Short term goal  time span:  2-4 weeks    Long Term Goals:  1. Boogie will have a reduction in chest circumference of 2cm in order to ensure fluid movement from the L upper quadrant.   2. Boogie will be able to cross country ski 3 days a week without sensation of soreness or deep ache to her L axilla.   Long term goal time span:  4-6 weeks    Plan:  Therapy options:  Physical therapy treatment to continue  Planned therapy interventions:  Decongestive exercises, home exercise program, intermittent compression, manual lymph drainage, strengthening exercises, Velcro wraps, soft tissue manual techniques (CPT 84823), skin/wound care, short stretch bandages, self-care/training (CPT 35336), referral for compression garment & instructions for don/doffing, range of motion exercises, postural exercises, patient education, orthotic measurements/fitting and myofascial release techniques  Planned education:  Functional anatomy and physiology of the lymphatic system, pathophysiology of lymphedema, lymphedema exercise, lymphedema precautions, proper skin care/nutrition, compression bandaging, self massage, infection prevention, scar tissue management, breast cancer rehab, activity guidelines, dietary guidelines, skin care guidelines, bandage removal, home pump use and long term self-management of lymphedema  Frequency:  1x week  Duration in weeks:  8  Discussed with:  Patient      Functional Assessment Used    LLIS    Referring provider co-signature:  I have reviewed this plan of care and my co-signature certifies the need for services.    Certification Period: 02/05/2024 to  04/01/24    Physician Signature: ________________________________ Date: ______________

## 2024-02-15 ENCOUNTER — PHYSICAL THERAPY (OUTPATIENT)
Dept: PHYSICAL THERAPY | Facility: REHABILITATION | Age: 49
End: 2024-02-15
Attending: FAMILY MEDICINE
Payer: COMMERCIAL

## 2024-02-15 DIAGNOSIS — I97.2 POSTMASTECTOMY LYMPHEDEMA SYNDROME: ICD-10-CM

## 2024-02-15 PROCEDURE — 97140 MANUAL THERAPY 1/> REGIONS: CPT

## 2024-02-15 NOTE — OP THERAPY DAILY TREATMENT
Outpatient Physical Therapy  LYMPHEDEMA THERAPY DAILY TREATMENT     [facility] Physical Therapy [address].  [address]  [place] [address]  Phone:  [phone]  Fax:  [phone]    Date: 02/15/2024    Patient: Boogie Mullins  YOB: 1975  MRN: [id number]     Time Calculation    Start time: 0730  Stop time: 0816 Time Calculation (min): 46 minutes         Chief Complaint: Lymphedema Breast Cancer and Breast Swelling    Visit #: 2    Subjective:   History of Present Illness:     Mechanism of injury:  L breast still very sore. Feels like when it came on in December it has not really changed.       Lymphedema Objective    Other Notes EVAL  Areola 92.2  Inferior areola 92.3    Therapeutic Exercises (CPT 39624):     1. supine mitali, HEP    2. prone mitali, HEP    3. backwards spider    4. flexion wave, x10, added to HEP    Therapeutic Treatments and Modalities:     1. Manual Therapy (CPT 62215)    Therapeutic Treatment and Modalities Summary: -Trigger point release to L: forearm extensors, biceps, pec major, pec minor, subscap, supraspinatus, infraspinatus, teres major/minor, traps, levator scap   -L joint mobilization to radius/ulna for interossei, wrist, CMC  -MLD to L breast including parasternal and intercostal collectors. Posteriorly as well.   -MLD to L UE     Time-based treatments/modalities:    Physical Therapy Timed Treatment Charges  Manual therapy minutes (CPT 56437): 46 minutes      Assessment and Plan:   Assessment details:  Boogie has had a subjective improvement in the size of her L breast; areola appears visually less full. Trigger point release appears to have assist with reduction in some of the discomfort she has been feeling. Will benefit from additional intervention to further improve her discomfort/tightness.     Plan:  Therapy options:  Physical therapy treatment to continue  Discussed with:  Patient

## 2024-02-22 ENCOUNTER — PHYSICAL THERAPY (OUTPATIENT)
Dept: PHYSICAL THERAPY | Facility: REHABILITATION | Age: 49
End: 2024-02-22
Attending: FAMILY MEDICINE
Payer: COMMERCIAL

## 2024-02-22 DIAGNOSIS — L76.82 AXILLARY WEB SYNDROME: ICD-10-CM

## 2024-02-22 DIAGNOSIS — L90.5 AXILLARY WEB SYNDROME: ICD-10-CM

## 2024-02-22 DIAGNOSIS — I97.2 POSTMASTECTOMY LYMPHEDEMA SYNDROME: ICD-10-CM

## 2024-02-22 PROCEDURE — 97140 MANUAL THERAPY 1/> REGIONS: CPT

## 2024-02-22 NOTE — OP THERAPY DAILY TREATMENT
Outpatient Physical Therapy  LYMPHEDEMA THERAPY DAILY TREATMENT     [facility] Physical Therapy [address]  [address]  [place] [address]  Phone:  [phone]  Fax:  [phone]    Date: 02/22/2024    Patient: Boogie Mullins  YOB: 1975  MRN: [id number]     Time Calculation    Start time: 0730  Stop time: 0819 Time Calculation (min): 49 minutes         Chief Complaint: Shoulder Problem and Lymphedema Breast Cancer    Visit #: 3    Subjective:   History of Present Illness:     Mechanism of injury:  Breast is really sore. Felt better after last session but then it returned to its soreness.       Lymphedema Objective    Other Notes EVAL  Areola 92.2  Inferior areola 92.3    Therapeutic Exercises (CPT 90273):     1. supine mitali, HEP    2. prone mitali, HEP    3. backwards spider    4. flexion wave, HEP    5. seated trunk rotation with breath, x2 B, added to HEP    Therapeutic Treatments and Modalities:     1. Manual Therapy (CPT 62367)    Therapeutic Treatment and Modalities Summary: -Trigger point release to L: forearm extensors, biceps, pec major, pec minor, subscap, supraspinatus, infraspinatus, teres major/minor, traps, levator scap   -L joint mobilization to radius/ulna for interossei, wrist, CMC  -MLD to L breast including parasternal and intercostal collectors. Posteriorly as well.   -MLD to L UE   -Rib mobilization to ribs 6-8; added breath    Time-based treatments/modalities:    Physical Therapy Timed Treatment Charges  Manual therapy minutes (CPT 35884): 49 minutes      Assessment and Plan:   Assessment details:  Boogie experienced brief relief to her breast pain following previous intervention, but it was short lived. Mid ribs appear anteriorly placed and she will benefit from intervention to address ribs and rib mobility which may also be contributing to her discomfort.     Plan:  Therapy options:  Physical therapy treatment to continue  Discussed with:  Patient

## 2024-02-26 DIAGNOSIS — C50.412 MALIGNANT NEOPLASM OF UPPER-OUTER QUADRANT OF LEFT BREAST IN FEMALE, ESTROGEN RECEPTOR POSITIVE (HCC): ICD-10-CM

## 2024-02-26 DIAGNOSIS — Z17.0 MALIGNANT NEOPLASM OF UPPER-OUTER QUADRANT OF LEFT BREAST IN FEMALE, ESTROGEN RECEPTOR POSITIVE (HCC): ICD-10-CM

## 2024-02-27 RX ORDER — TAMOXIFEN CITRATE 20 MG/1
20 TABLET ORAL DAILY
Qty: 60 TABLET | Refills: 2 | Status: SHIPPED | OUTPATIENT
Start: 2024-02-27

## 2024-02-29 ENCOUNTER — PHYSICAL THERAPY (OUTPATIENT)
Dept: PHYSICAL THERAPY | Facility: REHABILITATION | Age: 49
End: 2024-02-29
Attending: FAMILY MEDICINE
Payer: COMMERCIAL

## 2024-02-29 DIAGNOSIS — L90.5 AXILLARY WEB SYNDROME: ICD-10-CM

## 2024-02-29 DIAGNOSIS — L76.82 AXILLARY WEB SYNDROME: ICD-10-CM

## 2024-02-29 DIAGNOSIS — I97.2 POSTMASTECTOMY LYMPHEDEMA SYNDROME: ICD-10-CM

## 2024-02-29 PROCEDURE — 97140 MANUAL THERAPY 1/> REGIONS: CPT

## 2024-02-29 PROCEDURE — 97110 THERAPEUTIC EXERCISES: CPT

## 2024-02-29 NOTE — OP THERAPY DAILY TREATMENT
Outpatient Physical Therapy  LYMPHEDEMA THERAPY DAILY TREATMENT     [facility] Physical Therapy [address]  [address]  [place] [address]  Phone:  [phone]  Fax:  [phone]    Date: 02/29/2024    Patient: Boogie Mullins  YOB: 1975  MRN: [id number]     Time Calculation    Start time: 0731  Stop time: 0818 Time Calculation (min): 47 minutes         Chief Complaint: Rib Pain and Shoulder Problem    Visit #: 4    Subjective:   History of Present Illness:     Mechanism of injury:  L chest feels tight. Does not really notice it while running and did also ski and did not feel an increase in tightness.       Lymphedema Objective    Other Notes EVAL  Areola 92.2  Inferior areola 92.3    Therapeutic Exercises (CPT 62759):     1. supine mitali, HEP    2. prone mitali, HEP    3. backwards spider    4. flexion wave, HEP    5. seated trunk rotation with breath, HEP    6. massage ball on spine with UE elevation, x25; felt nauseated, too many reps, added to HEP with handout    Therapeutic Treatments and Modalities:     1. Manual Therapy (CPT 31287)    Therapeutic Treatment and Modalities Summary: -Trigger point release to L: pecotrals  -MFR along L breast and varying locations of tenderness, especially laterally  -rib mobilizations all directions in sidely  -scapular mobilizations in all directions  -seated movement with mobilization to thoracic spine T2-8 with rib mobilizations on L. Contract/relax to ribs 6-8 into L rotation    Time-based treatments/modalities:    Physical Therapy Timed Treatment Charges  Manual therapy minutes (CPT 40838): 35 minutes  Therapeutic exercise minutes (CPT 29242): 12 minutes      Assessment and Plan:   Assessment details:  Boogie has not had worsening of her tightness with activity but chest and ribs and breast all feel as though she has not stretched, despite daily stretching. Suspect fibrotic scarring from radiation to tissues limiting mobility, especially at  ribs. She will benefit from further intervention to improve joint mobility and reduce discomfort.     Plan:  Therapy options:  Physical therapy treatment to continue  Discussed with:  Patient

## 2024-03-05 ENCOUNTER — PHYSICAL THERAPY (OUTPATIENT)
Dept: PHYSICAL THERAPY | Facility: REHABILITATION | Age: 49
End: 2024-03-05
Attending: FAMILY MEDICINE
Payer: COMMERCIAL

## 2024-03-05 DIAGNOSIS — L76.82 AXILLARY WEB SYNDROME: ICD-10-CM

## 2024-03-05 DIAGNOSIS — I97.2 POSTMASTECTOMY LYMPHEDEMA SYNDROME: ICD-10-CM

## 2024-03-05 DIAGNOSIS — L90.5 AXILLARY WEB SYNDROME: ICD-10-CM

## 2024-03-05 PROCEDURE — 97140 MANUAL THERAPY 1/> REGIONS: CPT

## 2024-03-05 PROCEDURE — 97110 THERAPEUTIC EXERCISES: CPT

## 2024-03-05 NOTE — OP THERAPY PROGRESS SUMMARY
Outpatient Physical Therapy  PROGRESS SUMMARY NOTE      [facility] Physical Therapy [address].  [address]  [place] [address]  Phone:  [phone]  Fax:  [phone]    Date of Visit: 03/05/2024    Patient: Boogie Mullins  YOB: 1975  MRN: [id number]     Referring Provider: Cynthia Sanders M.D.  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 Added more aggressive manual techniques and additions to HEP to assist with muscle lengthening for release. She will benefit from further intervention to improve her discomfort. Her L breast does appear under control, suspect a flare in swelling after repeated UE activity.     Objective findings and assessment details: Other Notes EVAL  Areola 92.2  Inferior areola 92.3      Goals:   Short Term Goals:   1. Boogie will have a reduction in chest circumference of 1cm in order to ensure fluid movement from the L upper quadrant. Goal Met  2. Boogie will be able to reach behind her at will without sensation of shooting pain to L chest/arm. Goal Partially Met  3. Boogie will be able to perform an average distance (for her) run without sensations of soreness or pain to her L upper quadrant/chest. Goal Partially Met    Short term goal time span:  2-4 weeks      Long Term Goals:     1. Boogie will have a reduction in chest circumference of 2cm in order to ensure fluid movement from the L upper quadrant. Goal Not Met  2. Boogie will be able to cross country ski 3 days a week without sensation  of soreness or deep ache to her L axilla. GOal Not Met    Long term goal time span:  4-6 weeks    Plan:   Planned therapy interventions:  Functional Training, Self Care (CPT 54168), Manual Therapy (CPT 70902), Therapeutic Activities (CPT 57119) and Therapeutic Exercise (CPT 41397)  Frequency:  1x week  Duration in weeks:  8      Referring provider co-signature:  I have reviewed this plan of care and my co-signature certifies the need for services.     Certification Period: 03/05/2024 to 04/30/24    Physician Signature: ________________________________ Date: ______________

## 2024-03-05 NOTE — OP THERAPY DAILY TREATMENT
Outpatient Physical Therapy  LYMPHEDEMA THERAPY DAILY TREATMENT     [facility] Physical Therapy [address].  [address]  [place] [address]  Phone:  [phone]  Fax:  [phone]    Date: 03/05/2024    Patient: Boogie Mullins  YOB: 1975  MRN: [id number]     Time Calculation    Start time: 1505  Stop time: 1600 Time Calculation (min): 55 minutes         Chief Complaint: Shoulder Problem and Rib Pain    Visit #: 5    Subjective:   History of Present Illness:     Mechanism of injury:  Today, body is feeling better than it has all week.       Lymphedema Objective        Therapeutic Exercises (CPT 33896):     1. supine mitali, HEP    2. prone mitali, HEP    3. backwards spider    4. flexion wave, HEP    5. seated trunk rotation with breath, HEP    6. massage ball on spine with UE elevation, hep    7. doorway mitali, x5, added to HEP with handout    8. sidely flexion punch with adduction 3lb weight, x3, added to HEP with handout    Therapeutic Treatments and Modalities:     1. Self Care ADL Training (CPT 73903), Discussed effects of estrogen on muscle and tendons. Explained estrogen's role in muscle growth as well as ligamentous pliability.    2. Manual Therapy (CPT 75178)    Therapeutic Treatment and Modalities Summary: -Trigger point release to L: teres major, teres minor, pec major, pec minor  -MFR along L breast and varying locations of tenderness, especially laterally  -ART to subscap, teres major, lats    Time-based treatments/modalities:    Physical Therapy Timed Treatment Charges  Functional training, self care minutes (CPT 06972): 10 minutes  Manual therapy minutes (CPT 12790): 26 minutes  Therapeutic exercise minutes (CPT 88920): 14 minutes      Assessment and Plan:   Assessment details:  Boogie has been having pain to her L lateral chest and breast which today has improved but is bothersome. Added more aggressive manual techniques and additions to HEP to assist with muscle  lengthening for release. She will benefit from further intervention to improve her discomfort.     Plan:  Therapy options:  Physical therapy treatment to continue  Discussed with:  Patient

## 2024-03-18 ENCOUNTER — APPOINTMENT (OUTPATIENT)
Dept: PHYSICAL THERAPY | Facility: REHABILITATION | Age: 49
End: 2024-03-18
Attending: FAMILY MEDICINE
Payer: COMMERCIAL

## 2024-03-21 ENCOUNTER — TELEPHONE (OUTPATIENT)
Dept: HEMATOLOGY ONCOLOGY | Facility: MEDICAL CENTER | Age: 49
End: 2024-03-21

## 2024-03-21 ENCOUNTER — PHYSICAL THERAPY (OUTPATIENT)
Dept: PHYSICAL THERAPY | Facility: REHABILITATION | Age: 49
End: 2024-03-21
Attending: FAMILY MEDICINE
Payer: COMMERCIAL

## 2024-03-21 DIAGNOSIS — L90.5 AXILLARY WEB SYNDROME: ICD-10-CM

## 2024-03-21 DIAGNOSIS — L76.82 AXILLARY WEB SYNDROME: ICD-10-CM

## 2024-03-21 DIAGNOSIS — I97.2 POSTMASTECTOMY LYMPHEDEMA SYNDROME: ICD-10-CM

## 2024-03-21 PROCEDURE — 97110 THERAPEUTIC EXERCISES: CPT

## 2024-03-21 PROCEDURE — 97140 MANUAL THERAPY 1/> REGIONS: CPT

## 2024-03-21 NOTE — OP THERAPY DAILY TREATMENT
Outpatient Physical Therapy  LYMPHEDEMA THERAPY DAILY TREATMENT     [facility] Physical Therapy [address]  [address]  [place] [address]  Phone:  [phone]  Fax:  [phone]    Date: 03/21/2024    Patient: Boogie Mullins  YOB: 1975  MRN: [id number]     Time Calculation    Start time: 0730  Stop time: 0820 Time Calculation (min): 50 minutes         Chief Complaint: Shoulder Problem and Lymphedema Breast Cancer    Visit #: 6    Subjective:   History of Present Illness:     Mechanism of injury:  Body has been feeling like garbage. L breast seemed more full after she flew home from [place]. L breast is also quite sore. Cross-country skied last week and it was sore during the skiing.       Lymphedema Objective        Therapeutic Exercises (CPT 14070):     1. supine mitali, discussed for ongoing performance, HEP    2. prone mitali, HEP    3. backwards spider    4. flexion wave, HEP    5. seated trunk rotation with breath, HEP    6. massage ball on spine with UE elevation, hep    7. doorway mitali, HEP    8. sidely flexion punch with adduction 3lb weight, HEP    9. isometric ER with rotation, x10, revisited for HEP    Therapeutic Treatments and Modalities:     1. Manual Therapy (CPT 36231)    Therapeutic Treatment and Modalities Summary: -Trigger point release to L: forearm extensors, biceps, pec major, pec minor, subscap, supraspinatus, infraspinatus, teres major/minor  -MLD to L breast including parasternal and intercostal collectors. Posteriorly as well.   -ART to L subscapularis, teres major    Time-based treatments/modalities:    Physical Therapy Timed Treatment Charges  Manual therapy minutes (CPT 43403): 36 minutes  Therapeutic exercise minutes (CPT 96346): 14 minutes      Assessment and Plan:   Assessment details:  Boogie has had a flare in her L breast, likely related to air-travel. Encouraged ongoing performance of MLD to L breast as well as adjusting traveling attire including  doubling sports bra with a compressive tank top.     Plan:  Therapy options:  Physical therapy treatment to continue  Discussed with:  Patient

## 2024-03-26 ENCOUNTER — PHYSICAL THERAPY (OUTPATIENT)
Dept: PHYSICAL THERAPY | Facility: REHABILITATION | Age: 49
End: 2024-03-26
Attending: FAMILY MEDICINE
Payer: COMMERCIAL

## 2024-03-26 DIAGNOSIS — L90.5 AXILLARY WEB SYNDROME: ICD-10-CM

## 2024-03-26 DIAGNOSIS — L76.82 AXILLARY WEB SYNDROME: ICD-10-CM

## 2024-03-26 DIAGNOSIS — I97.2 POSTMASTECTOMY LYMPHEDEMA SYNDROME: ICD-10-CM

## 2024-03-26 PROCEDURE — 97140 MANUAL THERAPY 1/> REGIONS: CPT

## 2024-03-26 PROCEDURE — 97110 THERAPEUTIC EXERCISES: CPT

## 2024-03-26 NOTE — OP THERAPY DAILY TREATMENT
Outpatient Physical Therapy  LYMPHEDEMA THERAPY DAILY TREATMENT     [facility] Physical Therapy [address].  [address]  [place] [address]  Phone:  [phone]  Fax:  [phone]    Date: 03/26/2024    Patient: Boogie Mullins  YOB: 1975  MRN: [id number]     Time Calculation    Start time: 0819  Stop time: 0901 Time Calculation (min): 42 minutes         Chief Complaint: Shoulder Problem and Lymphedema Breast Cancer    Visit #: 7    Subjective:   History of Present Illness:     Mechanism of injury:  L breast feeling better. Has been doing isometric ER.       Lymphedema Objective        Therapeutic Exercises (CPT 29394):     1. supine mitali, discussed for ongoing performance, HEP    2. prone mitali, HEP    3. backwards spider    4. flexion wave, HEP    5. seated trunk rotation with breath, HEP    6. massage ball on spine with UE elevation, hep    7. doorway mitali, HEP    8. sidely flexion punch with adduction 3lb weight, HEP    9. isometric ER with rotation, x10, revisited for HEP    10. prone mitali, x8    Therapeutic Treatments and Modalities:     1. Manual Therapy (CPT 73008)    Therapeutic Treatment and Modalities Summary: -trigger point release in sidely: teres major, minor, infraspinatus, susbscap on L  -prone PA to T3-8 and costovertebral joints grade II-III.      Time-based treatments/modalities:    Physical Therapy Timed Treatment Charges  Manual therapy minutes (CPT 02303): 32 minutes  Therapeutic exercise minutes (CPT 08097): 10 minutes      Assessment and Plan:   Assessment details:  Boogie has seen an improvement to her L chest and breast discomfort. Encouraged ongoing HEP which she has been performing diligently. She will benefit from further intervention to sustain her improvement.    Plan:  Therapy options:  Physical therapy treatment to continue  Discussed with:  Patient

## 2024-04-04 ENCOUNTER — APPOINTMENT (OUTPATIENT)
Dept: PHYSICAL THERAPY | Facility: REHABILITATION | Age: 49
End: 2024-04-04
Attending: FAMILY MEDICINE
Payer: COMMERCIAL

## 2024-04-09 ENCOUNTER — PHYSICAL THERAPY (OUTPATIENT)
Dept: PHYSICAL THERAPY | Facility: REHABILITATION | Age: 49
End: 2024-04-09
Attending: SURGERY
Payer: COMMERCIAL

## 2024-04-09 DIAGNOSIS — L76.82 AXILLARY WEB SYNDROME: ICD-10-CM

## 2024-04-09 DIAGNOSIS — I97.2 POSTMASTECTOMY LYMPHEDEMA SYNDROME: ICD-10-CM

## 2024-04-09 DIAGNOSIS — L90.5 AXILLARY WEB SYNDROME: ICD-10-CM

## 2024-04-09 PROCEDURE — 97140 MANUAL THERAPY 1/> REGIONS: CPT

## 2024-04-09 PROCEDURE — 97535 SELF CARE MNGMENT TRAINING: CPT

## 2024-04-09 NOTE — OP THERAPY DAILY TREATMENT
Outpatient Physical Therapy  LYMPHEDEMA THERAPY DAILY TREATMENT     [facility] Physical Therapy [address]  [address]  [place] [address]  Phone:  [phone]  Fax:  [phone]    Date: 04/09/2024    Patient: Boogie Mullins  YOB: 1975  MRN: [id number]     Time Calculation    Start time: 0818  Stop time: 0901 Time Calculation (min): 43 minutes         Chief Complaint: Breast Swelling, Shoulder Problem, and Rib Pain    Visit #: 8    Subjective:   History of Present Illness:     Mechanism of injury:  Doing better overall but not great. Largely sore on the lateral aspect of L breast. Has own stretching program she is doing daily.       Lymphedema Objective        Therapeutic Exercises (CPT 50666):     1. supine mitali, HEP    2. prone mitali, HEP    3. backwards spider    4. flexion wave, HEP    5. seated trunk rotation with breath, HEP    6. massage ball on spine with UE elevation, hep    7. doorway mitali, HEP    8. sidely flexion punch with adduction 3lb weight, HEP    9. isometric ER with rotation, HEP    10. prone mitali    11. quadruped set: cat/cow, pelvic rotation, lumbar lateral flexion, x10 ea, added ot HEP    12. thread the needle, x5, already performing    Therapeutic Treatments and Modalities:     1. Self Care ADL Training (CPT 44702), Discussed compression bras and compression tank: pt purchased compression tank but feels it is too loose. Upon evaluation, while it is well-fitting, it is loose for compression. Discussed current bras: pt does have a SheFIt, but finds it to be somewhat uncomfortable for wear beyond intensive exercise. Recommended [place] Wear compression bra. Brand/sizing information provided.    2. Manual Therapy (CPT 56667)    Therapeutic Treatment and Modalities Summary: -trigger point release in sidely: teres major, minor, infraspinatus, susbscap on L  -rib mobilization AP ribs 2-6, laterally  -MLD to posterior collectors and anterior collectors.        Time-based treatments/modalities:    Physical Therapy Timed Treatment Charges  Functional training, self care minutes (CPT 53437): 20 minutes  Manual therapy minutes (CPT 01174): 23 minutes      Assessment and Plan:   Assessment details:  Boogie has not had worsening of her L chest tightness/rib pain since previous visit. Certainly she will benefit from ongoing performance of UE stretching, chest opening sequences, and rib mobility to improve the discomfort and tightness she has been experiencing. She has been consistently performing her HEP and is quite active. Will therefore space her next visit for check-in for improvement.     Plan:  Therapy options:  Physical therapy treatment to continue  Discussed with:  Patient

## 2024-04-09 NOTE — OP THERAPY PROGRESS SUMMARY
Outpatient Physical Therapy  PROGRESS SUMMARY NOTE      [facility] Physical Therapy [facility] [address]  [address]  [address]  [place] [address]  Phone:  [phone]  Fax:  [phone]    Date of Visit: 04/09/2024    Patient: Boogie Mullins  YOB: 1975  MRN: [id number]     Referring Provider: Antionette Au M.D.  [address]  [address]  [place] [address]   Referring Diagnosis Postmastectomy lymphedema syndrome [I97.2];Malignant neoplasm of overlapping sites of left female breast [C50.812]     Visit Diagnoses     ICD-10-CM   1. 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 Certainly she will benefit from ongoing performance of UE stretching, chest opening sequences, and rib mobility to improve the discomfort and tightness she has been experiencing. She has been consistently performing her HEP and is quite active. Will therefore space her next visit for check-in for improvement.       Objective findings and assessment details: Other Notes EVAL  Areola 92.2  Inferior areola 92.3      Goals:   Short Term Goals:   1. Boogie will have a reduction in chest circumference of 1cm in order to ensure fluid movement from the L upper quadrant. Goal Met  2. Boogie will be able to reach behind her at will without sensation of shooting pain to L chest/arm. Goal Partially Met  3. Boogie will be able to perform an average distance (for her) run without sensations of soreness or pain to her L upper quadrant/chest. Goal Partially Met    Short term goal time span:  2-4 weeks      Long Term Goals:     1. Boogie will have a reduction in chest circumference of 2cm in order to ensure fluid movement from the L upper quadrant. Goal Not Met  2. Boogie will be able  to cross country ski 3 days a week without sensation of soreness or deep ache to her L axilla. GOal Not Met    Long term goal time span:  4-6 weeks    Plan:   Planned therapy interventions:  Functional Training, Self Care (CPT 00230), Manual Therapy (CPT 98480), Therapeutic Activities (CPT 26001) and Therapeutic Exercise (CPT 19374)  Frequency:  2x month  Duration in weeks:  8      Referring provider co-signature:  I have reviewed this plan of care and my co-signature certifies the need for services.     Certification Period: 04/09/2024 to 06/05/24    Physician Signature: ________________________________ Date: ______________

## 2024-04-11 ENCOUNTER — APPOINTMENT (OUTPATIENT)
Dept: PHYSICAL THERAPY | Facility: REHABILITATION | Age: 49
End: 2024-04-11
Attending: FAMILY MEDICINE
Payer: COMMERCIAL

## 2024-04-18 ENCOUNTER — APPOINTMENT (OUTPATIENT)
Dept: PHYSICAL THERAPY | Facility: REHABILITATION | Age: 49
End: 2024-04-18
Payer: COMMERCIAL

## 2024-05-02 ENCOUNTER — APPOINTMENT (OUTPATIENT)
Dept: PHYSICAL THERAPY | Facility: REHABILITATION | Age: 49
End: 2024-05-02
Payer: COMMERCIAL

## 2024-05-02 DIAGNOSIS — L90.5 AXILLARY WEB SYNDROME: ICD-10-CM

## 2024-05-02 DIAGNOSIS — I97.2 POSTMASTECTOMY LYMPHEDEMA SYNDROME: ICD-10-CM

## 2024-05-02 DIAGNOSIS — L76.82 AXILLARY WEB SYNDROME: ICD-10-CM

## 2024-05-02 NOTE — OP THERAPY DAILY TREATMENT
Outpatient Physical Therapy  LYMPHEDEMA THERAPY DAILY TREATMENT     [facility] Physical Therapy [address]  [address]  [place] [address]  Phone:  [phone]  Fax:  [phone]    Date: 05/02/2024    Patient: Boogie Mullins  YOB: 1975  MRN: [id number]     Time Calculation    Start time: 1415  Stop time: 1502 Time Calculation (min): 47 minutes         Chief Complaint: Breast Swelling    Visit #: 9    Subjective:   History of Present Illness:     Mechanism of injury:  L breast feels swollen, sore, and stiff. It has felt this way for two weeks prior to her most recent race. After her race, she felt it was not worse.       Lymphedema Objective      Other Notes  Areola circ 95.7  Slxjalwp45.2    Other Notes EVAL  Areola 92.2  Inferior areola 92.3          Therapeutic Exercises (CPT 77561):     1. supine mitali, HEP    2. prone mitali, HEP    3. backwards spider    4. flexion wave, HEP    5. seated trunk rotation with breath, HEP    6. massage ball on spine with UE elevation, hep    7. doorway mitali, HEP    8. sidely flexion punch with adduction 3lb weight, HEP    9. isometric ER with rotation, HEP    10. prone mitali    11. quadruped set: cat/cow, pelvic rotation, lumbar lateral flexion, HEP    12. thread the needle, HEP      Therapeutic Exercise Summary:       Therapeutic Treatments and Modalities:     1. Self Care ADL Training (CPT 78917)    2. Manual Therapy (CPT 27621)    Therapeutic Treatment and Modalities Summary:   Self Care:   Discussed recommendation for a Flexitouch for continued maintenance of decreased swelling after the initial course of decongestion from physical therapy to prevent return of swelling into the tissues. Because the lymphatic system is located superficially and is already damaged from long-term swelling, a traditional pump will be inadequate. A traditional pump would cause further damage to the lymphatic limb as the compression will damage the already  fragile and intact lymphatic vessels. Patient will be unable to tolerate the high pressures of a basic pump. A basic pump will exacerbate the condition and cause intolerable pain due to the superficial and sensitive nature of lymphatic vessels. A Flexitouch works like manual lymphatic drainage, which clears the unaffected areas first to allow the swollen region to have tissue space to push the fluid and protein to. The Flexitouch also has a more progressive, segmental, and lighter compression than a traditional pump. Therefore, it is able to assist protein to move out of the affected tissue.      Manual:  -Trigger point release to L: forearm extensors, biceps, pec major, pec minor, subscap, supraspinatus, infraspinatus, teres major/minor, traps, levator scap   -L joint mobilization to radius/ulna for interossei, wrist, CMC  -MLD to L breast including parasternal and intercostal collectors. Posteriorly as well.   -MLD to L UE. The purpose of Manual Lymph Drainage (MLD) is to reduce lymph volume in the affected limb by increasing intake of lymphatic load into the lymphatic system, increasing the volume of transported lymph fluid, moving lymph fluid in superficial lymph vessels to collateral lymph collectors, anastomoses, or tissue channels, and increasing venous return. The goal of MLD is to re-route the lymph flow around blocked areas into more centrally located healthy lymph vessels, which drain into the venous system.       Time-based treatments/modalities:    Physical Therapy Timed Treatment Charges  Functional training, self care minutes (CPT 15299): 13 minutes  Manual therapy minutes (CPT 88706): 34 minutes      Assessment and Plan:   Assessment details:  Boogie, while having had improvement to her rib and shoulder discomfort, has actually had an increase in her breast swelling. She does consistently wear compression in the form of an OTC sports bra, however, did discuss importance of true compressive bra. Also  discussed benefit of vasopneumatic pump to assist with decongestion currently followed by management of flares long-term. She will require further intervention to decongest her L breast.     Plan:  Therapy options:  Physical therapy treatment to continue  Discussed with:  Patient

## 2024-05-02 NOTE — OP THERAPY PROGRESS SUMMARY
Outpatient Physical Therapy  PROGRESS SUMMARY NOTE      [facility] Physical Therapy [facility]  [address]  [address]  [place] [address]  Phone:  [phone]  Fax:  [phone]    Date of Visit: 05/02/2024    Patient: Boogie Mullins  YOB: 1975  MRN: [id number]     Referring Provider: Cynthia Sanders M.D.  [address] [place] [address]   Referring Diagnosis No admission diagnoses are documented for this encounter.     Visit Diagnoses     ICD-10-CM   1. 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 Axillary web syndrome  L76.82    L90.5       Rehab Potential: fair    Progress Report Period: 4/9/24-5/2/24    Functional Assessment Used          Objective Findings and Assessment:   Patient progression towards goals: Boogie, while having had improvement to her rib and shoulder discomfort, has actually had an increase in her breast swelling. She does consistently wear compression in the form of an OTC sports bra, however, did discuss importance of true compressive bra. Also discussed benefit of vasopneumatic pump to assist with decongestion currently followed by management of flares long-term. She will require further intervention to decongest her L breast.       Objective findings and assessment details: Other Notes 5/2  Areola circ 95.7cm  Inferior areola circ 96.2cm      Other Notes EVAL  Areola 92.2cm  Inferior areola 92.3cm      Goals:   Short Term Goals:   1. Boogie will have a reduction in chest circumference of 1cm in order to ensure fluid movement from the L upper quadrant. Goal Not Met  2. Boogie will be able to reach behind her at will without sensation of shooting pain to L chest/arm. Goal Partially Met  3. Boogie will be able to perform an average distance (for her) run without sensations of soreness or pain to her L upper quadrant/chest. Goal Partially Met    Short term goal time span:  2-4 weeks      Long Term Goals:     1. Boogie will have a reduction in chest  circumference of 2cm in order to ensure fluid movement from the L upper quadrant. Goal Not Met  2. Boogie will be able to cross country ski 3 days a week without sensation of soreness or deep ache to her L axilla. GOal Met    Long term goal time span:  4-6 weeks    Plan:   Planned therapy interventions:  Functional Training, Self Care (CPT 17105), Manual Therapy (CPT 01720), Therapeutic Activities (CPT 45984) and Therapeutic Exercise (CPT 34512)  Frequency:  2x month  Duration in weeks:  8      Referring provider co-signature:  I have reviewed this plan of care and my co-signature certifies the need for services.     Certification Period: 05/02/2024 to 06/27/24    Physician Signature: ________________________________ Date: ______________

## 2024-05-14 ENCOUNTER — APPOINTMENT (OUTPATIENT)
Dept: RADIOLOGY | Facility: MEDICAL CENTER | Age: 49
End: 2024-05-14
Attending: INTERNAL MEDICINE
Payer: COMMERCIAL

## 2024-05-14 DIAGNOSIS — Z17.0 MALIGNANT NEOPLASM OF UPPER-OUTER QUADRANT OF LEFT BREAST IN FEMALE, ESTROGEN RECEPTOR POSITIVE (HCC): ICD-10-CM

## 2024-05-14 DIAGNOSIS — C50.412 MALIGNANT NEOPLASM OF UPPER-OUTER QUADRANT OF LEFT BREAST IN FEMALE, ESTROGEN RECEPTOR POSITIVE (HCC): ICD-10-CM

## 2024-05-15 ENCOUNTER — PHYSICAL THERAPY (OUTPATIENT)
Dept: PHYSICAL THERAPY | Facility: REHABILITATION | Age: 49
End: 2024-05-15
Attending: FAMILY MEDICINE
Payer: COMMERCIAL

## 2024-05-15 DIAGNOSIS — L76.82 AXILLARY WEB SYNDROME: ICD-10-CM

## 2024-05-15 DIAGNOSIS — L90.5 AXILLARY WEB SYNDROME: ICD-10-CM

## 2024-05-15 DIAGNOSIS — I97.2 POSTMASTECTOMY LYMPHEDEMA SYNDROME: ICD-10-CM

## 2024-05-15 NOTE — OP THERAPY DAILY TREATMENT
Outpatient Physical Therapy  LYMPHEDEMA THERAPY DAILY TREATMENT     [facility] Physical Therapy [address]  [address]  [place] [address]  Phone:  [phone]  Fax:  [phone]    Date: 05/15/2024    Patient: Boogie Mullins  YOB: 1975  MRN: [id number]     Time Calculation    Start time: 0730  Stop time: 0819 Time Calculation (min): 49 minutes         Chief Complaint: Shoulder Problem and Lymphedema Breast Cancer    Visit #: 10    Subjective:   History of Present Illness:     Mechanism of injury:  Ribs seem elevated to her on L side. Has more pull to her center chest during some movements.       Lymphedema Objective      Other Notes  Other Notes 5/15  Areola circ 94.7  Inferior:94.2cm        Other Notes 5/2  Areola circ 95.7  Eeocggmp95.2     Other Notes EVAL  Areola 92.2  Inferior areola 92.3    Therapeutic Exercises (CPT 14963):     1. supine mitali, HEP    2. prone mitali, HEP    3. backwards spider    4. flexion wave, HEP    5. seated trunk rotation with breath, HEP    6. massage ball on spine with UE elevation, hep    7. doorway mitali, HEP    8. sidely flexion punch with adduction 3lb weight, HEP    9. isometric ER with rotation, HEP    10. prone mitali    11. quadruped set: cat/cow, pelvic rotation, lumbar lateral flexion, HEP    12. thread the needle, HEP    13. wall side reach, x10      Therapeutic Exercise Summary:       Therapeutic Treatments and Modalities:     1. Self Care ADL Training (CPT 86886), Discussed effects of radiation and persistence over the next year and a half.    2. Manual Therapy (CPT 70163)    Therapeutic Treatment and Modalities Summary:   Manual:  -Trigger point release to L: forearm extensors, biceps, pec major, pec minor, subscap, supraspinatus, infraspinatus, teres major/minor, traps, levator scap   -L joint mobilization to radius/ulna for interossei, wrist, CMC  -MLD to L breast including parasternal and intercostal collectors.   -MLD to L UE. The  purpose of Manual Lymph Drainage (MLD) is to reduce lymph volume in the affected limb by increasing intake of lymphatic load into the lymphatic system, increasing the volume of transported lymph fluid, moving lymph fluid in superficial lymph vessels to collateral lymph collectors, anastomoses, or tissue channels, and increasing venous return. The goal of MLD is to re-route the lymph flow around blocked areas into more centrally located healthy lymph vessels, which drain into the venous system.    -rib mobilization laterally with breath.     Time-based treatments/modalities:    Physical Therapy Timed Treatment Charges  Functional training, self care minutes (CPT 22147): 12 minutes  Manual therapy minutes (CPT 27587): 37 minutes      Assessment and Plan:   Assessment details:  Boogie has improved her breast swelling with performance of MLD as well as use of compression bra. She obtained a good compression bra, however, it does appear that it is too large. Have reached out to vendor to see if swap can be made. She will benefit from follow up to ensure she can control her L breast as well as improve her rib discomfort.     Plan:  Therapy options:  Physical therapy treatment to continue  Discussed with:  Patient

## 2024-05-15 NOTE — OP THERAPY PROGRESS SUMMARY
Outpatient Physical Therapy  PROGRESS SUMMARY NOTE      [facility] Physical Therapy [facility]  [address]  [address]  [place] [address]  Phone:  [phone]  Fax:  [phone]    Date of Visit: 05/15/2024    Patient: Boogie Mullins  YOB: 1975  MRN: [id number]     Referring Provider: Cynthia Sanders M.D.  [address] [place] [address]   Referring Diagnosis Postmastectomy lymphedema syndrome [I97.2]     Visit Diagnoses     ICD-10-CM   1. 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 Axillary web syndrome  L76.82    L90.5       Rehab Potential: fair    Progress Report Period: 4/9/24-5/15/24    Functional Assessment Used          Objective Findings and Assessment:   Patient progression towards goals: Boogie has improved her breast swelling with performance of MLD as well as use of compression bra. She obtained a good compression bra, however, it does appear that it is too large. Have reached out to vendor to see if swap can be made. She will benefit from follow up to ensure she can control her L breast as well as improve her rib discomfort.     Objective findings and assessment details: Other Notes 5/15  Areola circ 94.7  Inferior:94.2cm    Other Notes 5/2  Areola circ 95.7cm  Inferior areola circ 96.2cm      Other Notes EVAL  Areola 92.2cm  Inferior areola 92.3cm      Goals:   Short Term Goals:   1. Boogie will have a reduction in chest circumference of 1cm in order to ensure fluid movement from the L upper quadrant. Goal Not Met  2. Boogie will be able to reach behind her at will without sensation of shooting pain to L chest/arm. Goal Partially Met  3. Boogie will be able to perform an average distance (for her) run without sensations of soreness or pain to her L upper quadrant/chest. Goal Partially Met    Short term goal time span:  2-4 weeks      Long Term Goals:     1. Boogie will have a reduction in chest circumference of 2cm in order to ensure fluid movement from the L  upper quadrant. Goal Not Met  2. Boogie will be able to cross country ski 3 days a week without sensation of soreness or deep ache to her L axilla. GOal Met    Long term goal time span:  4-6 weeks    Plan:   Planned therapy interventions:  Functional Training, Self Care (CPT 42858), Manual Therapy (CPT 17236), Therapeutic Activities (CPT 16816) and Therapeutic Exercise (CPT 70003)  Frequency:  2x month  Duration in weeks:  8      Referring provider co-signature:  I have reviewed this plan of care and my co-signature certifies the need for services.     Certification Period: 05/15/2024 to 07/10/24    Physician Signature: ________________________________ Date: ______________

## 2024-05-23 ENCOUNTER — APPOINTMENT (OUTPATIENT)
Dept: HEMATOLOGY ONCOLOGY | Facility: MEDICAL CENTER | Age: 49
End: 2024-05-23
Payer: COMMERCIAL

## 2024-05-29 ENCOUNTER — PHYSICAL THERAPY (OUTPATIENT)
Dept: PHYSICAL THERAPY | Facility: REHABILITATION | Age: 49
End: 2024-05-29
Attending: FAMILY MEDICINE
Payer: COMMERCIAL

## 2024-05-29 DIAGNOSIS — I97.2 POSTMASTECTOMY LYMPHEDEMA SYNDROME: ICD-10-CM

## 2024-05-29 DIAGNOSIS — L90.5 AXILLARY WEB SYNDROME: ICD-10-CM

## 2024-05-29 DIAGNOSIS — L76.82 AXILLARY WEB SYNDROME: ICD-10-CM

## 2024-05-29 NOTE — OP THERAPY DAILY TREATMENT
Outpatient Physical Therapy  LYMPHEDEMA THERAPY DAILY TREATMENT     [facility] Physical [address]  [address]  [place] [address]  Phone:  [phone]  Fax:  [phone]    Date: 05/29/2024    Patient: Boogie Mullins  YOB: 1975  MRN: [id number]     Time Calculation    Start time: 0816  Stop time: 0905 Time Calculation (min): 49 minutes         Chief Complaint: Lymphedema Breast Cancer    Visit #: 11    Subjective:   History of Present Illness:     Mechanism of injury:  Feeling great when she is running. Thinks breast is coming down. Has been wearing her bra in morning and night. Has been placing [employer] II foam vertically into bra as well.       Lymphedema Objective      Other Notes  Other Notes 5/29  Areola circ 93.9cm  Inferior:94.2cm        Other Notes 5/15  Other Notes 5/15  Areola circ 94.7  Inferior:94.2cm          Other Notes 5/2  Areola circ 95.7  [id number]     Other Notes EVAL  Areola 92.2  Inferior areola 92.3    Therapeutic Exercises (CPT 42195):     1. supine mitali, HEP    2. prone mitali, HEP    3. backwards spider    4. flexion wave, HEP    5. seated trunk rotation with breath, HEP    6. massage ball on spine with UE elevation, hep    7. doorway mitali, HEP    8. sidely flexion punch with adduction 3lb weight, HEP    9. isometric ER with rotation, HEP    10. prone mitali    11. quadruped set: cat/cow, pelvic rotation, lumbar lateral flexion, HEP    12. thread the needle, HEP    13. wall side reach, x10      Therapeutic Exercise Summary:       Therapeutic Treatments and Modalities:     1. Self Care ADL Training (CPT 65022), Discussed effects of radiation and persistence over the next year and a half.    2. Manual Therapy (CPT 07196)    Therapeutic Treatment and Modalities Summary:   Manual:  -Trigger point release to L: forearm extensors, biceps, pec major, pec minor, subscap, supraspinatus, infraspinatus, teres major/minor, traps, levator scap   -L joint  mobilization to radius/ulna for interossei, wrist, CMC  -MLD to L breast including parasternal and intercostal collectors.   -MLD to L UE. The purpose of Manual Lymph Drainage (MLD) is to reduce lymph volume in the affected limb by increasing intake of lymphatic load into the lymphatic system, increasing the volume of transported lymph fluid, moving lymph fluid in superficial lymph vessels to collateral lymph collectors, anastomoses, or tissue channels, and increasing venous return. The goal of MLD is to re-route the lymph flow around blocked areas into more centrally located healthy lymph vessels, which drain into the venous system.    -rib mobilization laterally with breath.     Time-based treatments/modalities:    Physical Therapy Timed Treatment Charges  Manual therapy minutes (CPT 53621): 37 minutes  Therapeutic exercise minutes (CPT 72030): 12 minutes      Assessment and Plan:   Assessment details:  Boogie has reduced her L breast and has noted improvement with use of compression bra at day/nighttime and performance of self-MLD. She will benefit from follow up in a few weeks time to ensure that these improvements are sustained.     Plan:  Therapy options:  Physical therapy treatment to continue  Discussed with:  Patient

## 2024-05-30 ENCOUNTER — HOSPITAL ENCOUNTER (OUTPATIENT)
Dept: HEMATOLOGY ONCOLOGY | Facility: MEDICAL CENTER | Age: 49
End: 2024-05-30
Attending: INTERNAL MEDICINE
Payer: COMMERCIAL

## 2024-05-30 VITALS
HEART RATE: 78 BPM | WEIGHT: 142.2 LBS | SYSTOLIC BLOOD PRESSURE: 108 MMHG | BODY MASS INDEX: 25.2 KG/M2 | TEMPERATURE: 98.9 F | HEIGHT: 63 IN | RESPIRATION RATE: 13 BRPM | OXYGEN SATURATION: 100 % | DIASTOLIC BLOOD PRESSURE: 82 MMHG

## 2024-05-30 DIAGNOSIS — C50.412 MALIGNANT NEOPLASM OF UPPER-OUTER QUADRANT OF LEFT BREAST IN FEMALE, ESTROGEN RECEPTOR POSITIVE (HCC): ICD-10-CM

## 2024-05-30 DIAGNOSIS — Z17.0 MALIGNANT NEOPLASM OF UPPER-OUTER QUADRANT OF LEFT BREAST IN FEMALE, ESTROGEN RECEPTOR POSITIVE (HCC): ICD-10-CM

## 2024-05-30 RX ORDER — CHLORAL HYDRATE 500 MG
1000 CAPSULE ORAL
COMMUNITY

## 2024-05-30 ASSESSMENT — ENCOUNTER SYMPTOMS
MUSCULOSKELETAL NEGATIVE: 1
PSYCHIATRIC NEGATIVE: 1
RESPIRATORY NEGATIVE: 1
GASTROINTESTINAL NEGATIVE: 1
EYES NEGATIVE: 1
CARDIOVASCULAR NEGATIVE: 1
NEUROLOGICAL NEGATIVE: 1
CONSTITUTIONAL NEGATIVE: 1

## 2024-05-30 ASSESSMENT — PAIN SCALES - GENERAL: PAINLEVEL: NO PAIN

## 2024-05-30 NOTE — ADDENDUM NOTE
Encounter addended by: Jose Miguel Fields, Med Ass't on: 5/30/2024 12:23 PM   Actions taken: Charge Capture section accepted

## 2024-05-30 NOTE — PROGRESS NOTES
Medical oncology follow-up visit: 5/30/2024      Referring Physician:  Cynthia Sanders M.D.  Primary Care:  Cynthia Sanders M.D.    Diagnosis: At least microinvasive ductal carcinoma    Chief Complaint: Newly diagnosed at least microinvasive ductal carcinoma    History of Presenting Illness:  Evelyn Mullins is a 48 y.o. premenopausal white female who had a routine mammogram on 2/23/2023 that showed indeterminate calcifications in the left breast BI-RADS 4A.  On 5/26/2023 she underwent a stereotactic biopsy of this area which showed DCIS with at least microinvasive ductal carcinoma, grade 2, ER positive greater than 90%, UT positive greater than 90%, Ki-67 less than 10%, HER2 indeterminate as there was not enough tissue to make a diagnosis.  She has seen Dr. Au in consultation and will undergo partial mastectomy and sentinel lymph node biopsy next week.  She is nulliparous with a somewhat irregular.  But no hot flashes.  No family history of breast cancer.  Genetic testing is pending.  She is extremely healthy other than hypothyroidism well controlled and runs ultramarathons.    Interval history 7/12/2023.  She underwent partial mastectomy and sentinel lymph node biopsy on 6/27/2023.  Pathology showed invasive ductal carcinoma, grade 1, with extensive intermediate to high-grade DCIS.  The invasive tumor measured 1.6 cm in greatest dimension.  Final margins were clear both invasive and noninvasive tumor.  1 lymph node was positive for isolated tumor cells in 1 lymph node was benign.  Postoperative course was unremarkable and she was back doing full exercise within a week after surgery.  HER2 is still pending.  She is seeing radiation and wishes to defer this till beginning of September due to social events that are planned.    Interval history 10/5/2023: HER2 came back 1+ negative.  Oncotype DX recurrence score was 18.  Based on this chemotherapy was not recommended and she proceeded with radiation  therapy which she tolerated very well.  She has been extremely physically active and ran [place] before radiation therapy.  Her periods have been regular and shorter than previously and she missed her most recent cycle.  She denies any significant hot flashes or night sweats however.    Interval history 12/14/2023: She started tamoxifen in October 2023.  She has a feeling of being very hot at night which requires her to remove all her covers and then gets cold.  This happens most nights and is interrupting her sleep.  She does not have hot flashes during the day.  She had a vaginal discharge in the first few weeks and this has abated.  Otherwise she has no symptoms referable to tamoxifen.  Of note she has not had a period in several months now.    Interval history 5/30/2024: She is still having trouble with lymphedema and pain in the left axilla.  She is seeing Ela for treatments intermittently.  She has gained 10 pounds in the last 6 months and does not feel as comfortable in her body.  She did have spotting which is gone on intermittently this month with cramping.  These were the first menstrual related symptoms in several months.  Otherwise she is tolerating tamoxifen relatively well.  No severe hot flashes or night sweats.  She tried oxybutynin but briefly but discontinued.  Diagnostic mammogram on 5/14/2024 was negative.      Past Medical History:   Diagnosis Date    Bronchitis     Cancer (HCC) 05/30/23    High cholesterol     Technically high but not being treated    Malignant neoplasm of upper-outer quadrant of left breast in female, estrogen receptor positive (HCC) 6/20/2023    Urinary incontinence     Sneezing, jumping       Past Surgical History:   Procedure Laterality Date    PB MASTECTOMY, PARTIAL Left 6/27/2023    Procedure: WIRE LOCALIZED LEFT PARTIAL MASTECTOMY, MASTOPEXY, LEFT SENTINEL LYMPH INJECTION WITH EXCISION OF AXILLARY NODES;  Surgeon: Antionette Au M.D.;  Location: SURGERY SAME  DAY [facility];  Service: General    DE SUSPENSION OF BREAST Left 6/27/2023    Procedure: MASTOPEXY;  Surgeon: Antionette Au M.D.;  Location: SURGERY SAME DAY [facility];  Service: General    NODE BIOPSY SENTINEL Left 6/27/2023    Procedure: BIOPSY, LYMPH NODE, SENTINEL;  Surgeon: Antionette Au M.D.;  Location: SURGERY SAME DAY [facility];  Service: General    NO PERTINENT PAST SURGICAL HISTORY         Social History     Tobacco Use    Smoking status: Never     Passive exposure: Never    Smokeless tobacco: Never   Vaping Use    Vaping status: Never Used   Substance Use Topics    Alcohol use: Not Currently    Drug use: Not Currently     Types: Inhaled     Comment: Tried marijuana a few times        History reviewed. No pertinent family history.    Allergies as of 05/30/2024 - Reviewed 05/30/2024   Allergen Reaction Noted    Pcn [penicillins] Anaphylaxis 08/06/2016    Shellfish allergy Swelling 06/19/2023         Current Outpatient Medications:     Omega-3 Fatty Acids (FISH OIL) 1000 MG Cap capsule, Take 1,000 mg by mouth 3 times a day with meals., Disp: , Rfl:     tamoxifen (NOLVADEX) 20 MG tablet, Take 1 tablet by mouth daily., Disp: 60 Tablet, Rfl: 2    EPSOLAY 5 % cream, , Disp: , Rfl:     Melatonin 1 MG/4ML Liquid, , Disp: , Rfl:     Probiotic Product (PROBIOTIC + TURMERIC EXTRACT) 400 MG Cap, , Disp: , Rfl:     glutamine 500 mg/mL oral suspension, , Disp: , Rfl:     levothyroxine (SYNTHROID) 175 MCG Tab, , Disp: , Rfl:     fluticasone (FLONASE) 50 MCG/ACT nasal spray, Administer 1 Spray into affected nostril(S) every day., Disp: , Rfl:     VITAMIN D PO, Take 2,000 Int'l Units by mouth every day., Disp: , Rfl:     MAGNESIUM PO, Take 400 mg by mouth every day., Disp: , Rfl:     multivitamin Tab, Take 1 Tablet by mouth every day., Disp: , Rfl:     Cetirizine HCl (ZYRTEC ALLERGY PO), Take 10 mg by mouth every day., Disp: , Rfl:     levothyroxine (SYNTHROID) 150 MCG Tab, Take 175 mcg by mouth every morning on an empty  stomach. 5 days per week, 150 mg 2 days per week, Disp: , Rfl:     Review of Systems:  Review of Systems   Constitutional: Negative.    HENT: Negative.     Eyes: Negative.    Respiratory: Negative.     Cardiovascular: Negative.    Gastrointestinal: Negative.    Genitourinary: Negative.    Musculoskeletal: Negative.    Skin: Negative.    Neurological: Negative.    Endo/Heme/Allergies: Negative.    Psychiatric/Behavioral: Negative.            Physical Exam:      DESC; KARNOFSKY SCALE WITH ECOG EQUIVALENT: 100, Fully active, able to carry on all pre-disease performed without restriction (ECOG equivalent 0)    DISTRESS LEVEL: no acute distress    Physical Exam  Constitutional:       Appearance: Normal appearance.   HENT:      Head: Normocephalic.      Mouth/Throat:      Mouth: Mucous membranes are moist.      Pharynx: Oropharynx is clear.   Eyes:      Extraocular Movements: Extraocular movements intact.      Conjunctiva/sclera: Conjunctivae normal.      Pupils: Pupils are equal, round, and reactive to light.   Cardiovascular:      Rate and Rhythm: Normal rate and regular rhythm.      Pulses: Normal pulses.   Pulmonary:      Effort: Pulmonary effort is normal.      Breath sounds: Normal breath sounds.   Chest:      Comments: 5/30/2024 mild swelling in the left axilla after radiation.  No masses nipple discharge or tenderness noted in either breast.  No lymphadenopathy  Abdominal:      General: Abdomen is flat. Bowel sounds are normal.      Palpations: Abdomen is soft. There is no mass.   Musculoskeletal:         General: Normal range of motion.   Skin:     General: Skin is warm.   Neurological:      General: No focal deficit present.      Mental Status: She is alert and oriented to person, place, and time.   Psychiatric:         Mood and Affect: Mood normal.         Behavior: Behavior normal.      was not performed today      Labs:  Hospital Outpatient Visit on 05/26/2023   Component Date Value Ref Range Status     Pathology Request 05/26/2023 Sent to Histo   Final       Imaging:   All listed images below have been independently reviewed by me. I agree with the findings as summarized below:    SB-STEREOTACTIC BIOPSY LEFT INITIAL LESION    Addendum Date: 6/5/2023    Addendum dictated on 5/30/2023 by Dr. Grace as follows: Biopsy results were discussed with the patient 5/30/2023 approximately 1545 hours. Pathologic result: FINAL DIAGNOSIS: A. Left breast, calcifications, posterior to nipple, slight superior, stereotactic biopsy: At least micro-invasive ductal carcinoma with associated DCIS. Provisional rdGrdrrdarddrderd:rd rd3rd Tubule score: 3, Nuclear score: 2, Mitotic score: 1 Tumor Size: at least 2 mm (this limited core sample may not be reflective of the actual tumor size). Microcalcifications: Not identified Lymphovascular Invasion: Not identified Prognostic Studies: Estrogen receptors: Positive, moderate, > 90% Progesterone receptors: Positive, strong, > 90% Ki-67: < 10% tumor cells staining HER2: [pending will be attempted on block A1] Comment: This case has been reviewed by a second pathologist, Dr. Motley, who concurs with the diagnosis. The radiologic and pathologic results are concordant. Surgical consultation is recommended and that should be facilitated by her primary care physician Cynthia Sanders. She has no adverse symptoms referrable to the biopsy site.    Result Date: 6/5/2023 5/26/2023 12:39 PM HISTORY/REASON FOR EXAM:  Group of calcifications anterior superior left breast COMPARISON:  02/23/2023 TECHNIQUE/EXAM DESCRIPTION AND NUMBER OF VIEWS:  Left breast stereotactic-guided vacuum-assisted core biopsy. The procedure was discussed with the patient. Risks, benefits, and alternatives were discussed. Informed written consent was obtained. A timeout was performed. The calcifications was targeted from a CC approach. The skin was cleansed with Betadine. The overlying skin and underlying breast tissue were anesthetized with 2 mL of  1% lidocaine. A small skin incision was made. 10 mL 1% lidocaine with epinephrine was injected through the biopsy needle. 12 cores were obtained using a 9g Eviva Petite biopsy device. A Eviva hourglass clip marker was placed at biopsy site. Calcifications are noted within the specimen. Hemostasis was obtained with direct pressure. There were no apparent complications. Following stereotactic biopsy the patient was sent to a dedicated mammography unit for mammogram of the left breast there is successful marker deployment. Some residual punctate calcifications are noted in the region.. FINDINGS:  The calcifications was targeted from a CC approach. The skin was cleansed with Betadine. The overlying skin and underlying breast tissue were anesthetized with 2 mL of 1% lidocaine. A small skin incision was made. 10 mL 1% lidocaine with epinephrine was injected through the biopsy needle. 12 cores were obtained using a 9g Eviva Petite biopsy device. A Eviva hourglass clip marker was placed at biopsy site. Calcifications are noted within the specimen. Hemostasis was obtained with direct pressure. There were no apparent complications. Following stereotactic biopsy the patient was sent to a dedicated mammography unit for mammogram of the left breast there is successful marker deployment. Some residual punctate calcifications are noted in the region..     1.  Successful stereotactic biopsy of group of calcifications superior left breast near 12:00 position. 2.  Stereotactically guided placement of biopsy marker at the biopsy site. 3.  Two-view procedure mammogram demonstrates successful marker clip deployment.        Pathology:      Assessment & Plan:    1.  At least microinvasive ductal carcinoma in a background of DCIS of the left breast, ER positive greater than 90%, HI positive greater than 90%, Ki-67 less than 10%, HER2 not able to be done based on lack of tissue.  Had partial mastectomy she had stage I cancer (pT1c, pN0i+),  grade 1.  HER2 1+ negative.  Oncotype DX recurrence score 18.  On tamoxifen since October 2023.  2.  Moderate vasomotor symptoms to initiate oxybutynin  3 longstanding stress incontinence oxybutynin may help this as well.    Plan: Continue tamoxifen.  She will let us know if cramping and discharge continues.  Otherwise we will see her back in 6 months for routine follow-up.  She will continue monitoring lymphedema.  Any questions and concerns raised by the patient were answered to the best of my ability. Thank you for allowing me to participate in the care for this patient. Please feel free to contact me for any questions or concerns.     Dirk Womack M.D.

## 2024-06-27 ENCOUNTER — PHYSICAL THERAPY (OUTPATIENT)
Dept: PHYSICAL THERAPY | Facility: REHABILITATION | Age: 49
End: 2024-06-27
Attending: FAMILY MEDICINE
Payer: COMMERCIAL

## 2024-06-27 DIAGNOSIS — L90.5 AXILLARY WEB SYNDROME: ICD-10-CM

## 2024-06-27 DIAGNOSIS — I97.2 POSTMASTECTOMY LYMPHEDEMA SYNDROME: ICD-10-CM

## 2024-06-27 DIAGNOSIS — L76.82 AXILLARY WEB SYNDROME: ICD-10-CM

## 2024-06-27 PROCEDURE — 97535 SELF CARE MNGMENT TRAINING: CPT

## 2024-06-27 PROCEDURE — 97140 MANUAL THERAPY 1/> REGIONS: CPT

## 2024-06-27 NOTE — OP THERAPY DAILY TREATMENT
Outpatient Physical Therapy  LYMPHEDEMA THERAPY DAILY TREATMENT     [facility] Physical Therapy [address].  [address]  [place] [address]  Phone:  [phone]  Fax:  [phone]    Date: 06/27/2024    Patient: Boogie Mullins  YOB: 1975  MRN: [id number]     Time Calculation    Start time: 1634  Stop time: 1716 Time Calculation (min): 42 minutes         Chief Complaint: Shoulder Problem and Lymphedema Breast Cancer    Visit #: 12    Subjective:   History of Present Illness:     Mechanism of injury:  Did [place] this past weekend. Actually performed a three day event, Triple Crown, and L breast had no change before or after. Cycling, however, L breast does hurt when not wearing a firm bra, does have tenderness when hitting bumps. L breast is a little swollen. Not normal.       Lymphedema Objective      Other Notes  Other Notes 6/27  Areola circ 93.6cm  Inferior:94.5cm        Other Notes  Other Notes 5/29  Areola circ 93.9cm  Inferior:94.2cm          Other Notes 5/15  Other Notes 5/15  Areola circ 94.7  Inferior:94.2cm          Other Notes 5/2  Areola circ 95.7  [id number]     Other Notes EVAL  Areola 92.2  Inferior areola 92.3    Therapeutic Exercises (CPT 99538):     1. supine mitali, HEP    2. prone mitali, HEP    3. backwards spider    4. flexion wave, HEP    5. seated trunk rotation with breath, HEP    6. massage ball on spine with UE elevation, hep    7. doorway mitali, HEP    8. sidely flexion punch with adduction 3lb weight, HEP    9. isometric ER with rotation, HEP    10. prone mitali    11. quadruped set: cat/cow, pelvic rotation, lumbar lateral flexion, HEP    12. thread the needle, HEP    13. wall side reach      Therapeutic Exercise Summary:       Therapeutic Treatments and Modalities:     1. Self Care ADL Training (CPT 26793), Pt has been able to self-adjust her compression bras based on need and activity. No other changes necessary.    2. Manual Therapy  (CPT 70377)    Therapeutic Treatment and Modalities Summary:   Manual:  -Trigger point release to L: forearm extensors, biceps, pec major, pec minor, subscap, supraspinatus, infraspinatus, teres major/minor, traps, levator scap   -MLD to L breast including parasternal and intercostal collectors.   -MLD to L breast The purpose of Manual Lymph Drainage (MLD) is to reduce lymph volume in the affected limb by increasing intake of lymphatic load into the lymphatic system, increasing the volume of transported lymph fluid, moving lymph fluid in superficial lymph vessels to collateral lymph collectors, anastomoses, or tissue channels, and increasing venous return. The goal of MLD is to re-route the lymph flow around blocked areas into more centrally located healthy lymph vessels, which drain into the venous system.    -scapular mobilization in all directions; sidely    Time-based treatments/modalities:    Physical Therapy Timed Treatment Charges  Functional training, self care minutes (CPT 65564): 12 minutes  Manual therapy minutes (CPT 63431): 30 minutes      Assessment and Plan:   Assessment details:  Boogie has sustained her measurements. Her L breast does appear visually more full than her R, however, skin testure and tissue appearance indicate this is likely more due to scarring from partial mastectomy rather than lymphatic fluid. Certainly, she will continue to monitor, however, areola appears similar to R, which is another excellent sign. She has progressed such that she has been invited to return in a month's time to evaluate for fullness or soft tissue/bony issues that can present following radiation.     Plan:  Therapy options:  Physical therapy treatment to continue  Discussed with:  Patient

## 2024-06-28 NOTE — OP THERAPY PROGRESS SUMMARY
Outpatient Physical Therapy  PROGRESS SUMMARY NOTE      [facility] Physical Therapy [facility]  [address]  [address]  [place] [address]  Phone:  [phone]  Fax:  [phone]    Date of Visit: 06/27/2024    Patient: Boogie Mullins  YOB: 1975  MRN: [id number]     Referring Provider: Cynthia Sanders M.D.  [address] [place] [address]   Referring Diagnosis No admission diagnoses are documented for this encounter.     Visit Diagnoses     ICD-10-CM   1. 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 Her L breast does appear visually more full than her R, however, skin testure and tissue appearance indicate this is likely more due to scarring from partial mastectomy rather than lymphatic fluid. Certainly, she will continue to monitor, however, areola appears similar to R, which is another excellent sign. She has progressed such that she has been invited to return in a month's time to evaluate for fullness or soft tissue/bony issues that can present following radiation.       Objective findings and assessment details:   Other Notes 6/27  Areola circ 93.6cm  Inferior:94.5cm    Other Notes 5/15  Areola circ 94.7  Inferior:94.2cm    Other Notes 5/2  Areola circ 95.7cm  Inferior areola circ 96.2cm      Other Notes EVAL  Areola 92.2cm  Inferior areola 92.3cm      Goals:   Short Term Goals:   1. Boogie will have a reduction in chest circumference of 1cm in order to ensure fluid movement from the L upper quadrant. Goal Met  2. Boogie will be able to reach behind her at will without sensation of shooting pain to L chest/arm. Goal Met  3. Boogie will be able to perform an average distance (for her) run without sensations of soreness  or pain to her L upper quadrant/chest. Goal Partially Met    Short term goal time span:  2-4 weeks      Long Term Goals:     1. Boogie will have a reduction in chest circumference of 2cm in order to ensure fluid movement from the L upper quadrant. Goal Not Met  2. Boogie will be able to cross country ski 3 days a week without sensation of soreness or deep ache to her L axilla. GOal Met    Long term goal time span:  4-6 weeks    Plan:   Planned therapy interventions:  Functional Training, Self Care (CPT 10647), Manual Therapy (CPT 39045), Therapeutic Activities (CPT 25767) and Therapeutic Exercise (CPT 65309)  Frequency:  1x month  Duration in weeks:  8      Referring provider co-signature:  I have reviewed this plan of care and my co-signature certifies the need for services.     Certification Period: 06/27/2024 to 08/22/24    Physician Signature: ________________________________ Date: ______________

## 2024-08-06 ENCOUNTER — PHYSICAL THERAPY (OUTPATIENT)
Dept: PHYSICAL THERAPY | Facility: REHABILITATION | Age: 49
End: 2024-08-06
Attending: FAMILY MEDICINE
Payer: COMMERCIAL

## 2024-08-06 DIAGNOSIS — L76.82 AXILLARY WEB SYNDROME: ICD-10-CM

## 2024-08-06 DIAGNOSIS — L90.5 AXILLARY WEB SYNDROME: ICD-10-CM

## 2024-08-06 DIAGNOSIS — I97.2 POSTMASTECTOMY LYMPHEDEMA SYNDROME: ICD-10-CM

## 2024-08-06 PROCEDURE — 97110 THERAPEUTIC EXERCISES: CPT

## 2024-08-06 PROCEDURE — 97140 MANUAL THERAPY 1/> REGIONS: CPT

## 2024-08-06 NOTE — OP THERAPY DISCHARGE SUMMARY
Outpatient Physical Therapy  DISCHARGE SUMMARY NOTE      [facility] Therapy [place] [address].  [address]  [facility] [address]  Phone:  [phone]  Fax:  [phone]    Date of Visit: 08/06/2024    Patient: Boogie Mullins  YOB: 1975  MRN: [id number]     Referring Provider: Cynthia Sanders M.D.  [address] [address]   Referring Diagnosis No admission diagnoses are documented for this encounter.             Your patient is being discharged from Physical Therapy with the following comments:   Goals met    Comments:  Boogie is sustaining her L breast measurements. She understands to perform self-MLD as well as utilize her compression to manage fluid buildup. She does still suffer from L breast discomfort as well as some rib irritation as well. Anticipate improvement with longer duration out from radiation. At this time, Boogie does not require further skilled intervention and will be DC'd.     Thank you for the referral,    Amirah Tim, PT, DPT, CLT    Date: 8/6/2024

## 2024-08-06 NOTE — OP THERAPY DAILY TREATMENT
Outpatient Physical Therapy  LYMPHEDEMA THERAPY DAILY TREATMENT     [facility] Physical Therapy [address].  [address]  [place] [address]  Phone:  [phone]  Fax:  [phone]    Date: 08/06/2024    Patient: Boogie Mullins  YOB: 1975  MRN: [id number]     Time Calculation    Start time: 1117  Stop time: 1201 Time Calculation (min): 44 minutes         Chief Complaint: Lymphedema Breast Cancer, Rib Pain, and Scar    Visit #: 13    Subjective:   History of Present Illness:     Mechanism of injury:  Recently went on cycling trip to [place] and had some pain to her ribs/side. Mileage was around 40mi per day with packs.   Breast swelled around the third week. Flying did not seem to affect breast at all.       Lymphedema Objective      Other Notes  8/6  94.1cm areolar  95.2cm          6/27  Areola circ 93.6cm  Inferior:94.5cm     Other Notes 5/15  Areola circ 94.7  Inferior:94.2cm     Other Notes 5/2  Areola circ 95.7cm  Inferior areola circ 96.2cm        Other Notes EVAL  Areola 92.2cm  Inferior areola 92.3cm    Therapeutic Exercises (CPT 36800):     1. supine mitali, HEP    2. prone mitali, HEP    3. backwards spider    4. flexion wave, HEP    5. seated trunk rotation with breath, HEP    6. massage ball on spine with UE elevation, hep    7. doorway mitali, HEP    8. sidely flexion punch with adduction 3lb weight, HEP    9. isometric ER with rotation, HEP    10. prone mitali    11. quadruped set: cat/cow, pelvic rotation, lumbar lateral flexion, HEP    12. thread the needle, HEP    13. wall side reach    14. HADDER, with orange TB, x10, added to HEP      Therapeutic Exercise Summary:       Therapeutic Treatments and Modalities:     1. Manual Therapy (CPT 25209)    Therapeutic Treatment and Modalities Summary: -MLD to L breast  -gentle scar massage to L breast; encouraged pt to do the same for desensitization  -trigger point release to teres major/minor  -strain/counter strain at L breast,  but unlikely pain is fascial in origin.     Time-based treatments/modalities:    Physical Therapy Timed Treatment Charges  Manual therapy minutes (CPT 42506): 35 minutes  Therapeutic exercise minutes (CPT 35625): 9 minutes      Assessment and Plan:   Assessment details:  Boogie is sustaining her L breast measurements. She understands to perform self-MLD as well as utilize her compression to manage fluid buildup. She does still suffer from L breast discomfort as well as some rib irritation as well. Anticipate improvement with longer duration out from radiation. At this time, Boogie does not require further skilled intervention and will be DC'd.     Plan:  Therapy options:  No further treatment needed  Discussed with:  Patient

## 2024-08-20 ENCOUNTER — OFFICE VISIT (OUTPATIENT)
Dept: URGENT CARE | Facility: CLINIC | Age: 49
End: 2024-08-20
Payer: COMMERCIAL

## 2024-08-20 VITALS
OXYGEN SATURATION: 100 % | WEIGHT: 140 LBS | RESPIRATION RATE: 20 BRPM | TEMPERATURE: 98.2 F | BODY MASS INDEX: 24.8 KG/M2 | SYSTOLIC BLOOD PRESSURE: 110 MMHG | HEART RATE: 70 BPM | DIASTOLIC BLOOD PRESSURE: 78 MMHG | HEIGHT: 63 IN

## 2024-08-20 DIAGNOSIS — L03.012 PARONYCHIA OF FINGER OF LEFT HAND: ICD-10-CM

## 2024-08-20 DIAGNOSIS — M76.62 ACHILLES TENDINITIS OF LEFT LOWER EXTREMITY: ICD-10-CM

## 2024-08-20 PROCEDURE — 99213 OFFICE O/P EST LOW 20 MIN: CPT | Mod: 25 | Performed by: PHYSICIAN ASSISTANT

## 2024-08-20 RX ORDER — DOXYCYCLINE HYCLATE 100 MG
100 TABLET ORAL 2 TIMES DAILY
Qty: 14 TABLET | Refills: 0 | Status: SHIPPED | OUTPATIENT
Start: 2024-08-20 | End: 2024-08-27

## 2024-08-20 NOTE — PROGRESS NOTES
"  Subjective:   Evelyn Mullins is a 49 y.o. female who presents today with   Chief Complaint   Patient presents with    Foot Problem     X2 months Left achilles, Would like a referral to PT     Finger Pain     X1 week, \" Left pinky, very red and painful. I have tried soaking it, and applying ointments, finger is not getting better.\"      Foot Problem  This is a new problem. The current episode started more than 1 month ago. The problem occurs constantly. The problem has been unchanged. She has tried rest for the symptoms. The treatment provided mild relief.     Patient comes in today for 2 separate complaints.  Patient states she is an avid runner and states she was doing several events of 100+ miles over the course of several days and took about a month off and was only riding her bike and it seemed to get better but then was at an event last week and noticed pain again starting up in the Achilles.  No specific injury to the Achilles and no sharp or searing pain to the Achilles.  Feels more like overuse injury.    For the left pinky finger she has been trying some antibiotic ointment to the area but has continued to get worse.    PMH:  has a past medical history of Bronchitis, Cancer (HCC) (05/30/23), High cholesterol, Malignant neoplasm of upper-outer quadrant of left breast in female, estrogen receptor positive (HCC) (6/20/2023), and Urinary incontinence.  MEDS:   Current Outpatient Medications:     doxycycline (VIBRAMYCIN) 100 MG Tab, Take 1 Tablet by mouth 2 times a day for 7 days., Disp: 14 Tablet, Rfl: 0    Omega-3 Fatty Acids (FISH OIL) 1000 MG Cap capsule, Take 1,000 mg by mouth 3 times a day with meals., Disp: , Rfl:     tamoxifen (NOLVADEX) 20 MG tablet, Take 1 tablet by mouth daily., Disp: 60 Tablet, Rfl: 2    EPSOLAY 5 % cream, , Disp: , Rfl:     Melatonin 1 MG/4ML Liquid, , Disp: , Rfl:     Probiotic Product (PROBIOTIC + TURMERIC EXTRACT) 400 MG Cap, , Disp: , Rfl:     glutamine 500 mg/mL oral " suspension, , Disp: , Rfl:     levothyroxine (SYNTHROID) 175 MCG Tab, , Disp: , Rfl:     fluticasone (FLONASE) 50 MCG/ACT nasal spray, Administer 1 Spray into affected nostril(S) every day., Disp: , Rfl:     VITAMIN D PO, Take 2,000 Int'l Units by mouth every day., Disp: , Rfl:     MAGNESIUM PO, Take 400 mg by mouth every day., Disp: , Rfl:     multivitamin Tab, Take 1 Tablet by mouth every day., Disp: , Rfl:     Cetirizine HCl (ZYRTEC ALLERGY PO), Take 10 mg by mouth every day., Disp: , Rfl:     levothyroxine (SYNTHROID) 150 MCG Tab, Take 175 mcg by mouth every morning on an empty stomach. 5 days per week, 150 mg 2 days per week, Disp: , Rfl:   ALLERGIES:   Allergies   Allergen Reactions    Pcn [Penicillins] Anaphylaxis    Shellfish Allergy Swelling     SURGHX:   Past Surgical History:   Procedure Laterality Date    PB MASTECTOMY, PARTIAL Left 6/27/2023    Procedure: WIRE LOCALIZED LEFT PARTIAL MASTECTOMY, MASTOPEXY, LEFT SENTINEL LYMPH INJECTION WITH EXCISION OF AXILLARY NODES;  Surgeon: Antionette Au M.D.;  Location: SURGERY SAME DAY [facility];  Service: General    VA SUSPENSION OF BREAST Left 6/27/2023    Procedure: MASTOPEXY;  Surgeon: Antionette Au M.D.;  Location: SURGERY SAME DAY [facility];  Service: General    NODE BIOPSY SENTINEL Left 6/27/2023    Procedure: BIOPSY, LYMPH NODE, SENTINEL;  Surgeon: Antionette Au M.D.;  Location: SURGERY SAME DAY [facility];  Service: General    NO PERTINENT PAST SURGICAL HISTORY       SOCHX:  reports that she has never smoked. She has never been exposed to tobacco smoke. She has never used smokeless tobacco. She reports that she does not currently use alcohol. She reports that she does not currently use drugs after having used the following drugs: Inhaled.  FH: Reviewed with patient, not pertinent to this visit.     Review of Systems   Musculoskeletal:         Left achilles pain   Skin:         Left fifth digit infection      Objective:   /78 (BP Location: Right  "arm, Patient Position: Sitting, BP Cuff Size: Adult long)   Pulse 70   Temp 36.8 °C (98.2 °F) (Temporal)   Resp 20   Ht 1.6 m (5' 3\")   Wt 63.5 kg (140 lb)   SpO2 100%   BMI 24.80 kg/m²   Physical Exam  Vitals and nursing note reviewed.   Constitutional:       General: She is not in acute distress.     Appearance: Normal appearance. She is well-developed. She is not ill-appearing or toxic-appearing.   HENT:      Head: Normocephalic and atraumatic.      Right Ear: Hearing normal.      Left Ear: Hearing normal.   Cardiovascular:      Rate and Rhythm: Normal rate.   Pulmonary:      Effort: Pulmonary effort is normal.   Musculoskeletal:        Arms:       Left ankle:      Left Achilles Tendon: Tenderness present. No defects. Gibbons's test negative.      Comments: Patient has slight indurated area around the lateral aspect of the fifth digit fingernail.  No proximal erythema or streaking.  Patient has full range of motion of the left fifth digit.  Neurovascular intact distally.  Less than 2 cap refill.  No bony tenderness to the left ankle.  No bruising or swelling.  No deficit noted through the left Achilles.     Skin:     General: Skin is warm and dry.   Neurological:      Mental Status: She is alert.      Coordination: Coordination normal.   Psychiatric:         Mood and Affect: Mood normal.         Assessment/Plan:   Assessment    1. Paronychia of finger of left hand  - doxycycline (VIBRAMYCIN) 100 MG Tab; Take 1 Tablet by mouth 2 times a day for 7 days.  Dispense: 14 Tablet; Refill: 0    2. Achilles tendinitis of left lower extremity  - Referral to Physical Therapy    Symptoms and presentation appear consistent with Achilles tendinitis of the left lower extremity and recommend rest, ice, compression and elevation.  Offered walking boot but she declines.  She states she would like to follow-up with physical therapist of her choice.  No indication for imaging at this time but did discuss red flag signs to " monitor for for her.  Her finger is consistent with paronychia and verbal consent obtained prior to procedure today.  Clean the area with alcohol swab and used sterile 11 blade scalpel and advanced at the nail border and purulent drainage occurred.  Was able to clean area and placed antibiotic ointment and bandage to the area.  Wound care instructions discussed with patient.  Sent in antibiotics in case symptoms progress.  Would recommend avoiding running or overuse to the Achilles.    Differential diagnosis, natural history, supportive care, and indications for immediate follow-up discussed.   Patient given instructions and understanding of medications and treatment.    If not improving in 3-5 days, F/U with PCP or return to UC if symptoms worsen.    Patient agreeable to plan.    Please note that this dictation was created using voice recognition software. I have made every reasonable attempt to correct obvious errors, but I expect that there are errors of grammar and possibly content that I did not discover before finalizing the note.    Mike Eastman PA-C

## 2024-10-24 ENCOUNTER — HOSPITAL ENCOUNTER (OUTPATIENT)
Dept: RADIOLOGY | Facility: MEDICAL CENTER | Age: 49
End: 2024-10-24
Attending: OBSTETRICS & GYNECOLOGY
Payer: COMMERCIAL

## 2024-10-24 DIAGNOSIS — Z79.810 CARE RELATED TO CURRENT TAMOXIFEN USE: ICD-10-CM

## 2024-10-24 PROCEDURE — 76830 TRANSVAGINAL US NON-OB: CPT

## 2024-11-22 ASSESSMENT — ENCOUNTER SYMPTOMS
RESPIRATORY NEGATIVE: 1
NEUROLOGICAL NEGATIVE: 1
PSYCHIATRIC NEGATIVE: 1
CARDIOVASCULAR NEGATIVE: 1
CONSTITUTIONAL NEGATIVE: 1
GASTROINTESTINAL NEGATIVE: 1
EYES NEGATIVE: 1

## 2024-11-22 NOTE — PROGRESS NOTES
Medical oncology follow-up visit: 11/26/2024      Referring Physician:  Cynthia Sanders M.D.  Primary Care:  Cynthia Sanders M.D.    Diagnosis: At least microinvasive ductal carcinoma    Chief Complaint: Newly diagnosed at least microinvasive ductal carcinoma    History of Presenting Illness:  Evelyn Mullins is a 48 y.o. premenopausal white female who had a routine mammogram on 2/23/2023 that showed indeterminate calcifications in the left breast BI-RADS 4A.  On 5/26/2023 she underwent a stereotactic biopsy of this area which showed DCIS with at least microinvasive ductal carcinoma, grade 2, ER positive greater than 90%, CO positive greater than 90%, Ki-67 less than 10%, HER2 indeterminate as there was not enough tissue to make a diagnosis.  She has seen Dr. Au in consultation and will undergo partial mastectomy and sentinel lymph node biopsy next week.  She is nulliparous with a somewhat irregular.  But no hot flashes.  No family history of breast cancer.  Genetic testing is pending.  She is extremely healthy other than hypothyroidism well controlled and runs ultramarathons.    Interval history 7/12/2023.  She underwent partial mastectomy and sentinel lymph node biopsy on 6/27/2023.  Pathology showed invasive ductal carcinoma, grade 1, with extensive intermediate to high-grade DCIS.  The invasive tumor measured 1.6 cm in greatest dimension.  Final margins were clear both invasive and noninvasive tumor.  1 lymph node was positive for isolated tumor cells in 1 lymph node was benign.  Postoperative course was unremarkable and she was back doing full exercise within a week after surgery.  HER2 is still pending.  She is seeing radiation and wishes to defer this till beginning of September due to social events that are planned.    Interval history 10/5/2023: HER2 came back 1+ negative.  Oncotype DX recurrence score was 18.  Based on this chemotherapy was not recommended and she proceeded with radiation  therapy which she tolerated very well.  She has been extremely physically active and ran [employer] before radiation therapy.  Her periods have been regular and shorter than previously and she missed her most recent cycle.  She denies any significant hot flashes or night sweats however.    Interval history 12/14/2023: She started tamoxifen in October 2023.  She has a feeling of being very hot at night which requires her to remove all her covers and then gets cold.  This happens most nights and is interrupting her sleep.  She does not have hot flashes during the day.  She had a vaginal discharge in the first few weeks and this has abated.  Otherwise she has no symptoms referable to tamoxifen.  Of note she has not had a period in several months now.    Interval history 5/30/2024: She is still having trouble with lymphedema and pain in the left axilla.  She is seeing Ela for treatments intermittently.  She has gained 10 pounds in the last 6 months and does not feel as comfortable in her body.  She did have spotting which is gone on intermittently this month with cramping.  These were the first menstrual related symptoms in several months.  Otherwise she is tolerating tamoxifen relatively well.  No severe hot flashes or night sweats.  She tried oxybutynin but briefly but discontinued.  Diagnostic mammogram on 5/14/2024 was negative.    Interval history 11/26/2024 ([facility] Durbin): Patient returns to clinic today for follow-up appointment. Patient denies any changes in her breast, including any new lumps or masses, skin dimpling, puckering, or erythema, nipple retraction or discharge, or any breast tenderness/discomfort.  She has followed up with her gynecologist Dr. Odonnell who recommends continuing on the tamoxifen at this time, and repeating her pelvic ultrasound in 6 months.  Patient states the ultrasound is scheduled for April 2025.  She denies any additional episodes of vaginal bleeding.  Patient  reports that she does continue to have a left serious joint aches and pains while on the tamoxifen.  She is now seen physical therapy for help with the pain in her ankle, lower back, and shoulders.  She states this pain is so bad that it makes it very, very hard for her to exercise, and sometimes even to move.  She feels the pain may be related to the tamoxifen, and is unsure she can continue with this medication.  She also reports some ongoing tenderness at the incision site, as well as along the left side of her breast.  She was seeing physical therapy for her lymphedema in her left arm, but states that is now resolved.  No other complaints or concerns provided.      Treatment history:  6/27/2023 left breast partial mastectomy and sentinel lymph node biopsy  9/5/2023 left breast radiation started.  October 2023 tamoxifen started          Past Medical History:   Diagnosis Date    Bronchitis     Cancer (HCC) 05/30/23    High cholesterol     Technically high but not being treated    Malignant neoplasm of upper-outer quadrant of left breast in female, estrogen receptor positive (HCC) 6/20/2023    Urinary incontinence     Sneezing, jumping       Past Surgical History:   Procedure Laterality Date    PB MASTECTOMY, PARTIAL Left 6/27/2023    Procedure: WIRE LOCALIZED LEFT PARTIAL MASTECTOMY, MASTOPEXY, LEFT SENTINEL LYMPH INJECTION WITH EXCISION OF AXILLARY NODES;  Surgeon: Antionette Au M.D.;  Location: SURGERY SAME DAY [facility];  Service: General    NM SUSPENSION OF BREAST Left 6/27/2023    Procedure: MASTOPEXY;  Surgeon: Antionette Au M.D.;  Location: SURGERY SAME DAY [facility];  Service: General    NODE BIOPSY SENTINEL Left 6/27/2023    Procedure: BIOPSY, LYMPH NODE, SENTINEL;  Surgeon: Antionette Au M.D.;  Location: SURGERY SAME DAY [facility];  Service: General    NO PERTINENT PAST SURGICAL HISTORY         Social History     Tobacco Use    Smoking status: Never     Passive exposure: Never    Smokeless tobacco:  Never   Vaping Use    Vaping status: Never Used   Substance Use Topics    Alcohol use: Not Currently    Drug use: Not Currently     Types: Inhaled     Comment: Tried marijuana a few times        No family history on file.    Allergies as of 11/26/2024 - Reviewed 08/20/2024   Allergen Reaction Noted    Pcn [penicillins] Anaphylaxis 08/06/2016    Shellfish allergy Swelling 06/19/2023         Current Outpatient Medications:     Omega-3 Fatty Acids (FISH OIL) 1000 MG Cap capsule, Take 1,000 mg by mouth 3 times a day with meals., Disp: , Rfl:     tamoxifen (NOLVADEX) 20 MG tablet, Take 1 tablet by mouth daily., Disp: 60 Tablet, Rfl: 2    EPSOLAY 5 % cream, , Disp: , Rfl:     Melatonin 1 MG/4ML Liquid, , Disp: , Rfl:     Probiotic Product (PROBIOTIC + TURMERIC EXTRACT) 400 MG Cap, , Disp: , Rfl:     glutamine 500 mg/mL oral suspension, , Disp: , Rfl:     levothyroxine (SYNTHROID) 175 MCG Tab, , Disp: , Rfl:     fluticasone (FLONASE) 50 MCG/ACT nasal spray, Administer 1 Spray into affected nostril(S) every day., Disp: , Rfl:     VITAMIN D PO, Take 2,000 Int'l Units by mouth every day., Disp: , Rfl:     MAGNESIUM PO, Take 400 mg by mouth every day., Disp: , Rfl:     multivitamin Tab, Take 1 Tablet by mouth every day., Disp: , Rfl:     Cetirizine HCl (ZYRTEC ALLERGY PO), Take 10 mg by mouth every day., Disp: , Rfl:     levothyroxine (SYNTHROID) 150 MCG Tab, Take 175 mcg by mouth every morning on an empty stomach. 5 days per week, 150 mg 2 days per week, Disp: , Rfl:     Review of Systems:  Review of Systems   Constitutional: Negative.    HENT: Negative.     Eyes: Negative.    Respiratory: Negative.     Cardiovascular: Negative.    Gastrointestinal: Negative.    Genitourinary: Negative.    Musculoskeletal:  Positive for back pain, joint pain and myalgias.        Significant pain in her ankle, lower back, and shoulder, requiring physical therapy.  Interferes with her ability to exercise regularly.   Skin: Negative.   "  Neurological: Negative.    Endo/Heme/Allergies: Negative.    Psychiatric/Behavioral: Negative.            Physical Exam:    /72 (BP Location: Right arm, Patient Position: Sitting, BP Cuff Size: Adult)   Pulse 74   Temp 36.1 °C (97 °F) (Temporal)   Ht 1.6 m (5' 2.99\")   Wt 68.2 kg (150 lb 3.9 oz)   SpO2 100%   BMI 26.62 kg/m²     DESC; KARNOFSKY SCALE WITH ECOG EQUIVALENT: 100, Fully active, able to carry on all pre-disease performed without restriction (ECOG equivalent 0)    DISTRESS LEVEL: no acute distress    Physical Exam  Constitutional:       Appearance: Normal appearance. She is normal weight.   HENT:      Head: Normocephalic.      Nose: Nose normal.      Mouth/Throat:      Mouth: Mucous membranes are moist.      Pharynx: Oropharynx is clear.   Eyes:      Extraocular Movements: Extraocular movements intact.      Conjunctiva/sclera: Conjunctivae normal.   Cardiovascular:      Rate and Rhythm: Normal rate and regular rhythm.      Pulses: Normal pulses.      Heart sounds: Normal heart sounds.   Pulmonary:      Effort: Pulmonary effort is normal.      Breath sounds: Normal breath sounds.   Chest:      Comments: 10/26/2024 well-healed surgical incision in left breast.  No palpable lumps or masses, skin puckering or dimpling, nipple discharge or inversion, noted in either breast.    5/30/2024 mild swelling in the left axilla after radiation.  No masses nipple discharge or tenderness noted in either breast.  No lymphadenopathy.  Abdominal:      General: Abdomen is flat. Bowel sounds are normal.      Palpations: Abdomen is soft. There is no mass.   Musculoskeletal:         General: Normal range of motion.      Cervical back: Normal range of motion and neck supple.   Lymphadenopathy:      Cervical: No cervical adenopathy.      Upper Body:      Right upper body: No supraclavicular, axillary or pectoral adenopathy.      Left upper body: No supraclavicular, axillary or pectoral adenopathy.      Lower Body: " No right inguinal adenopathy. No left inguinal adenopathy.   Skin:     General: Skin is warm.   Neurological:      General: No focal deficit present.      Mental Status: She is alert and oriented to person, place, and time.   Psychiatric:         Mood and Affect: Mood normal.         Behavior: Behavior normal.         Thought Content: Thought content normal.         Judgment: Judgment normal.           Labs:     Latest Reference Range & Units 23 15:40   WBC 4.8 - 10.8 K/uL 9.5   RBC 4.20 - 5.40 M/uL 3.80 (L)   Hemoglobin 12.0 - 16.0 g/dL 12.8   Hematocrit 37.0 - 47.0 % 38.4   MCV 81.4 - 97.8 fL 101.1 (H)   MCH 27.0 - 33.0 pg 33.7 (H)   MCHC 32.2 - 35.5 g/dL 33.3   RDW 35.9 - 50.0 fL 48.2   Platelet Count 164 - 446 K/uL 295   MPV 9.0 - 12.9 fL 10.3   Neutrophils-Polys 44.00 - 72.00 % 65.30   Neutrophils (Absolute) 1.82 - 7.42 K/uL 6.21   Lymphocytes 22.00 - 41.00 % 25.40   Lymphs (Absolute) 1.00 - 4.80 K/uL 2.41   Monocytes 0.00 - 13.40 % 5.20   Monos (Absolute) 0.00 - 0.85 K/uL 0.49   Eosinophils 0.00 - 6.90 % 3.40   Eos (Absolute) 0.00 - 0.51 K/uL 0.32   Basophils 0.00 - 1.80 % 0.50   Baso (Absolute) 0.00 - 0.12 K/uL 0.05   Immature Granulocytes 0.00 - 0.90 % 0.20   Immature Granulocytes (abs) 0.00 - 0.11 K/uL 0.02   Nucleated RBC 0.00 - 0.20 /100 WBC 0.00   NRBC (Absolute) K/uL 0.00   Sodium 135 - 145 mmol/L 136   Potassium 3.6 - 5.5 mmol/L 3.9   Chloride 96 - 112 mmol/L 102   Co2 20 - 33 mmol/L 25   Anion Gap 7.0 - 16.0  9.0   Glucose 65 - 99 mg/dL 83   Bun 8 - 22 mg/dL 18   Creatinine 0.50 - 1.40 mg/dL 0.96   GFR (CKD-EPI) >60 mL/min/1.73 m 2 72   Calcium 8.5 - 10.5 mg/dL 9.7   (L): Data is abnormally low  (H): Data is abnormally high    Imagin2024 Bilateral tomosynthesis diagnostic mammography   1.  No mammographic evidence of malignancy.  2.  Postsurgical architectural distortion noted in the left breast.  3.  Recommend continued annual mammography.  R2 - CATEGORY 2: BENIGN  FINDING(S)    10/24/2024 Transabdominal and transvaginal pelvic ultrasound.   1.  Left adnexal cyst measuring up to 3.9 cm. Recommend follow-up in 3-6 months.  2.  Nonspecific heterogeneity of the myometrium.  3.  Endometrial stripe within normal limits.    Pathology:    6/27/2023 At least microinvasive ductal carcinoma in a background of DCIS of the left breast, ER positive greater than 90%, MI positive greater than 90%, Ki-67 less than 10%,  Stage I cancer (pT1c, pN0i+), grade 1.  HER2 1+ negative.  Oncotype DX recurrence score 18.      Assessment & Plan:    1.  At least microinvasive ductal carcinoma in a background of DCIS of the left breast, ER positive greater than 90%, MI positive greater than 90%, Ki-67 less than 10%, HER2 not able to be done based on lack of tissue.  Had partial mastectomy she had stage I cancer (pT1c, pN0i+), grade 1.  HER2 1+ negative.  Oncotype DX recurrence score 18.  On tamoxifen since October 2023 with significant musculoskeletal discomfort.   2. Last mammogram done 5/14/2024 BI-RADS Category 2.  3.  Uterine cramping and pelvic discharge.  Ultrasound done 10/24/2024 recommended follow-up in 3 to 6 months.  Patient to continue to follow with gynecology as recommended.  Next pelvic ultrasound scheduled for April 2025.    Risk benefits and possible side effects of stopping tamoxifen reviewed with patient.  Patient states she feels she cannot continue on the tamoxifen at this time, as she feels it is significantly impacting her her ability to move/exercise.  Reviewed with patient the possibility of taking a break from the medication, to see if her muscle symptoms improve.  If side effects improve off medication, we can consider resuming the medication at at a low dose of 5 mg of tamoxifen per day.  Patient, symptoms, medication, and plan reviewed with Dr. Womack, who agrees with this plan of care.    Plan: Will hold tamoxifen at this time.  Labs today.  Plan for patient to return to  clinic in 6 to 8 weeks to assess side effects.  If side effects have resolved, we will consider restarting the tamoxifen at a low dose of 5 mg daily.  Continue to follow with gynecology as recommended.  Next pelvic ultrasound planned for April 2025.  Next bilateral diagnostic mammogram due 5/15/2025.  Patient is in need of baseline DEXA scan, order placed.    Reviewed with patient strategies to help reduce the risk of breast cancer recurrence, including achieving/maintaining a healthy BMI, limiting alcoholic intake, and complete abstinence from use of tobacco products.  Also reviewed with patient signs and symptoms for which to urgently contact the clinic, including the development of any new lumps or masses, any skin puckering,dimpling or textural changes, any new nipple inversion, or any nipple discharge.     Reviewed with patient strategies to maintain bone density, including daily calcium intake of 1200 mg and vitamin D supplement of 2000 IU.  Patient also instructed to engage in at least 30 minutes of weightbearing activity daily.     Plan of care reviewed with patient and all questions answered.  Patient verbalizes understanding, denies questions, and agrees with plan of care.  Patient instructed to call or message clinic with any questions or concerns, contact information provided.    Thank you for allowing me the privilege of caring for this patient.  If if you have any questions, please do not hesitate to reach out.  I may be contacted at [phone].    Mary Grissom, MSN, AOCNP, FNP-C    Please note that this dictation was created using voice recognition software. I have made every reasonable attempt to correct obvious errors, but I expect that there are errors of grammar, and possibly content, that I did not discover before finalizing the note.     GRACIELA Roman.

## 2024-11-26 ENCOUNTER — HOSPITAL ENCOUNTER (OUTPATIENT)
Dept: HEMATOLOGY ONCOLOGY | Facility: MEDICAL CENTER | Age: 49
End: 2024-11-26
Payer: COMMERCIAL

## 2024-11-26 VITALS
WEIGHT: 150.24 LBS | HEART RATE: 74 BPM | HEIGHT: 63 IN | SYSTOLIC BLOOD PRESSURE: 118 MMHG | OXYGEN SATURATION: 100 % | DIASTOLIC BLOOD PRESSURE: 72 MMHG | TEMPERATURE: 97 F | BODY MASS INDEX: 26.62 KG/M2

## 2024-11-26 DIAGNOSIS — Z79.810 USE OF TAMOXIFEN (NOLVADEX): ICD-10-CM

## 2024-11-26 DIAGNOSIS — Z17.0 MALIGNANT NEOPLASM OF UPPER-OUTER QUADRANT OF LEFT BREAST IN FEMALE, ESTROGEN RECEPTOR POSITIVE (HCC): ICD-10-CM

## 2024-11-26 DIAGNOSIS — C50.412 MALIGNANT NEOPLASM OF UPPER-OUTER QUADRANT OF LEFT BREAST IN FEMALE, ESTROGEN RECEPTOR POSITIVE (HCC): ICD-10-CM

## 2024-11-26 PROCEDURE — 99212 OFFICE O/P EST SF 10 MIN: CPT

## 2024-11-26 PROCEDURE — 99214 OFFICE O/P EST MOD 30 MIN: CPT

## 2024-11-26 ASSESSMENT — ENCOUNTER SYMPTOMS
BACK PAIN: 1
MYALGIAS: 1

## 2024-11-26 ASSESSMENT — PAIN SCALES - GENERAL: PAINLEVEL_OUTOF10: 6=MODERATE PAIN

## 2024-11-28 LAB
25(OH)D3+25(OH)D2 SERPL-MCNC: 47 NG/ML (ref 30–100)
ALBUMIN SERPL-MCNC: 4.2 G/DL (ref 3.9–4.9)
ALP SERPL-CCNC: 51 IU/L (ref 44–121)
ALT SERPL-CCNC: 15 IU/L (ref 0–32)
AST SERPL-CCNC: 22 IU/L (ref 0–40)
BASOPHILS # BLD AUTO: 0.1 X10E3/UL (ref 0–0.2)
BASOPHILS NFR BLD AUTO: 1 %
BILIRUB SERPL-MCNC: 0.3 MG/DL (ref 0–1.2)
BUN SERPL-MCNC: 22 MG/DL (ref 6–24)
BUN/CREAT SERPL: 22 (ref 9–23)
CALCIUM SERPL-MCNC: 9.4 MG/DL (ref 8.7–10.2)
CHLORIDE SERPL-SCNC: 109 MMOL/L (ref 96–106)
CO2 SERPL-SCNC: 20 MMOL/L (ref 20–29)
CREAT SERPL-MCNC: 0.99 MG/DL (ref 0.57–1)
EGFRCR SERPLBLD CKD-EPI 2021: 69 ML/MIN/1.73
EOSINOPHIL # BLD AUTO: 0.3 X10E3/UL (ref 0–0.4)
EOSINOPHIL NFR BLD AUTO: 6 %
ERYTHROCYTE [DISTWIDTH] IN BLOOD BY AUTOMATED COUNT: 12 % (ref 11.7–15.4)
GLOBULIN SER CALC-MCNC: 2.3 G/DL (ref 1.5–4.5)
GLUCOSE SERPL-MCNC: 86 MG/DL (ref 70–99)
HCT VFR BLD AUTO: 37.3 % (ref 34–46.6)
HGB BLD-MCNC: 12.3 G/DL (ref 11.1–15.9)
IMM GRANULOCYTES # BLD AUTO: 0 X10E3/UL (ref 0–0.1)
IMM GRANULOCYTES NFR BLD AUTO: 0 %
IMMATURE CELLS  115398: ABNORMAL
LYMPHOCYTES # BLD AUTO: 1.7 X10E3/UL (ref 0.7–3.1)
LYMPHOCYTES NFR BLD AUTO: 29 %
MCH RBC QN AUTO: 32.5 PG (ref 26.6–33)
MCHC RBC AUTO-ENTMCNC: 33 G/DL (ref 31.5–35.7)
MCV RBC AUTO: 99 FL (ref 79–97)
MONOCYTES # BLD AUTO: 0.4 X10E3/UL (ref 0.1–0.9)
MONOCYTES NFR BLD AUTO: 6 %
MORPHOLOGY BLD-IMP: ABNORMAL
NEUTROPHILS # BLD AUTO: 3.5 X10E3/UL (ref 1.4–7)
NEUTROPHILS NFR BLD AUTO: 58 %
NRBC BLD AUTO-RTO: ABNORMAL %
PLATELET # BLD AUTO: 290 X10E3/UL (ref 150–450)
POTASSIUM SERPL-SCNC: 4.4 MMOL/L (ref 3.5–5.2)
PROT SERPL-MCNC: 6.5 G/DL (ref 6–8.5)
RBC # BLD AUTO: 3.78 X10E6/UL (ref 3.77–5.28)
SODIUM SERPL-SCNC: 141 MMOL/L (ref 134–144)
WBC # BLD AUTO: 6 X10E3/UL (ref 3.4–10.8)

## 2024-12-09 ENCOUNTER — APPOINTMENT (OUTPATIENT)
Dept: URBAN - METROPOLITAN AREA CLINIC 6 | Facility: CLINIC | Age: 49
Setting detail: DERMATOLOGY
End: 2024-12-09

## 2024-12-09 DIAGNOSIS — D18.0 HEMANGIOMA: ICD-10-CM

## 2024-12-09 DIAGNOSIS — Z71.89 OTHER SPECIFIED COUNSELING: ICD-10-CM

## 2024-12-09 DIAGNOSIS — L82.1 OTHER SEBORRHEIC KERATOSIS: ICD-10-CM

## 2024-12-09 DIAGNOSIS — L81.4 OTHER MELANIN HYPERPIGMENTATION: ICD-10-CM

## 2024-12-09 DIAGNOSIS — D22 MELANOCYTIC NEVI: ICD-10-CM

## 2024-12-09 PROBLEM — D18.01 HEMANGIOMA OF SKIN AND SUBCUTANEOUS TISSUE: Status: ACTIVE | Noted: 2024-12-09

## 2024-12-09 PROBLEM — D22.5 MELANOCYTIC NEVI OF TRUNK: Status: ACTIVE | Noted: 2024-12-09

## 2024-12-09 PROCEDURE — ? SUNSCREEN TREATMENT REGIMEN

## 2024-12-09 PROCEDURE — ? COUNSELING

## 2024-12-09 PROCEDURE — 99213 OFFICE O/P EST LOW 20 MIN: CPT

## 2024-12-09 PROCEDURE — ? SUNSCREEN RECOMMENDATIONS

## 2024-12-09 ASSESSMENT — LOCATION DETAILED DESCRIPTION DERM
LOCATION DETAILED: LEFT ULNAR DORSAL HAND
LOCATION DETAILED: RIGHT ULNAR DORSAL HAND
LOCATION DETAILED: RIGHT MEDIAL SUPERIOR CHEST
LOCATION DETAILED: RIGHT MEDIAL BREAST 4-5:00 REGION
LOCATION DETAILED: LEFT INFERIOR MEDIAL MALAR CHEEK
LOCATION DETAILED: RIGHT RADIAL DORSAL HAND
LOCATION DETAILED: EPIGASTRIC SKIN
LOCATION DETAILED: PERIUMBILICAL SKIN
LOCATION DETAILED: LEFT RADIAL DORSAL HAND

## 2024-12-09 ASSESSMENT — LOCATION SIMPLE DESCRIPTION DERM
LOCATION SIMPLE: RIGHT BREAST
LOCATION SIMPLE: LEFT HAND
LOCATION SIMPLE: LEFT CHEEK
LOCATION SIMPLE: ABDOMEN
LOCATION SIMPLE: CHEST
LOCATION SIMPLE: RIGHT HAND

## 2024-12-09 ASSESSMENT — LOCATION ZONE DERM
LOCATION ZONE: TRUNK
LOCATION ZONE: HAND
LOCATION ZONE: FACE

## 2024-12-18 ENCOUNTER — APPOINTMENT (OUTPATIENT)
Dept: HEMATOLOGY ONCOLOGY | Facility: MEDICAL CENTER | Age: 49
End: 2024-12-18
Payer: COMMERCIAL

## 2024-12-18 DIAGNOSIS — C50.412 MALIGNANT NEOPLASM OF UPPER-OUTER QUADRANT OF LEFT BREAST IN FEMALE, ESTROGEN RECEPTOR POSITIVE (HCC): ICD-10-CM

## 2024-12-18 DIAGNOSIS — R53.82 CHRONIC FATIGUE: ICD-10-CM

## 2024-12-18 DIAGNOSIS — Z17.0 MALIGNANT NEOPLASM OF UPPER-OUTER QUADRANT OF LEFT BREAST IN FEMALE, ESTROGEN RECEPTOR POSITIVE (HCC): ICD-10-CM

## 2024-12-18 DIAGNOSIS — R53.83 FATIGUE, UNSPECIFIED TYPE: ICD-10-CM

## 2024-12-18 DIAGNOSIS — Z79.810 USE OF TAMOXIFEN (NOLVADEX): ICD-10-CM

## 2024-12-18 DIAGNOSIS — R71.8 ELEVATED MCV: ICD-10-CM

## 2024-12-18 DIAGNOSIS — R63.5 UNEXPLAINED WEIGHT GAIN: ICD-10-CM

## 2025-01-07 ENCOUNTER — HOSPITAL ENCOUNTER (OUTPATIENT)
Dept: HEMATOLOGY ONCOLOGY | Facility: MEDICAL CENTER | Age: 50
End: 2025-01-07
Payer: COMMERCIAL

## 2025-01-07 VITALS
SYSTOLIC BLOOD PRESSURE: 138 MMHG | OXYGEN SATURATION: 99 % | BODY MASS INDEX: 26.11 KG/M2 | WEIGHT: 147.38 LBS | HEIGHT: 63 IN | HEART RATE: 75 BPM | DIASTOLIC BLOOD PRESSURE: 82 MMHG | TEMPERATURE: 97.2 F

## 2025-01-07 DIAGNOSIS — C50.412 MALIGNANT NEOPLASM OF UPPER-OUTER QUADRANT OF LEFT BREAST IN FEMALE, ESTROGEN RECEPTOR POSITIVE (HCC): ICD-10-CM

## 2025-01-07 DIAGNOSIS — Z79.810 USE OF TAMOXIFEN (NOLVADEX): ICD-10-CM

## 2025-01-07 DIAGNOSIS — Z17.0 MALIGNANT NEOPLASM OF UPPER-OUTER QUADRANT OF LEFT BREAST IN FEMALE, ESTROGEN RECEPTOR POSITIVE (HCC): ICD-10-CM

## 2025-01-07 DIAGNOSIS — N64.52 NIPPLE DISCHARGE IN FEMALE: ICD-10-CM

## 2025-01-07 PROCEDURE — 99212 OFFICE O/P EST SF 10 MIN: CPT

## 2025-01-07 PROCEDURE — 99214 OFFICE O/P EST MOD 30 MIN: CPT

## 2025-01-07 RX ORDER — TAMOXIFEN CITRATE 10 MG/1
5 TABLET ORAL DAILY
Qty: 30 TABLET | Refills: 3 | Status: SHIPPED | OUTPATIENT
Start: 2025-01-07

## 2025-01-07 ASSESSMENT — ENCOUNTER SYMPTOMS
NEUROLOGICAL NEGATIVE: 1
MYALGIAS: 1
CONSTITUTIONAL NEGATIVE: 1
BACK PAIN: 1
GASTROINTESTINAL NEGATIVE: 1
EYES NEGATIVE: 1
CARDIOVASCULAR NEGATIVE: 1
PSYCHIATRIC NEGATIVE: 1
RESPIRATORY NEGATIVE: 1

## 2025-01-07 ASSESSMENT — FIBROSIS 4 INDEX: FIB4 SCORE: 0.92

## 2025-01-07 ASSESSMENT — PAIN SCALES - GENERAL: PAINLEVEL_OUTOF10: 6=MODERATE PAIN

## 2025-01-07 NOTE — PROGRESS NOTES
Medical oncology follow-up visit: 11/26/2024      Referring Physician:  Cynthia Sanders M.D.  Primary Care:  Cynthia Sanders M.D.    Diagnosis: At least microinvasive ductal carcinoma    Chief Complaint: Newly diagnosed at least microinvasive ductal carcinoma    History of Presenting Illness:  Evelyn Mullins is a 48 y.o. premenopausal white female who had a routine mammogram on 2/23/2023 that showed indeterminate calcifications in the left breast BI-RADS 4A.  On 5/26/2023 she underwent a stereotactic biopsy of this area which showed DCIS with at least microinvasive ductal carcinoma, grade 2, ER positive greater than 90%, OR positive greater than 90%, Ki-67 less than 10%, HER2 indeterminate as there was not enough tissue to make a diagnosis.  She has seen Dr. Au in consultation and will undergo partial mastectomy and sentinel lymph node biopsy next week.  She is nulliparous with a somewhat irregular.  But no hot flashes.  No family history of breast cancer.  Genetic testing is pending.  She is extremely healthy other than hypothyroidism well controlled and runs ultramarathons.    Interval history 7/12/2023.  She underwent partial mastectomy and sentinel lymph node biopsy on 6/27/2023.  Pathology showed invasive ductal carcinoma, grade 1, with extensive intermediate to high-grade DCIS.  The invasive tumor measured 1.6 cm in greatest dimension.  Final margins were clear both invasive and noninvasive tumor.  1 lymph node was positive for isolated tumor cells in 1 lymph node was benign.  Postoperative course was unremarkable and she was back doing full exercise within a week after surgery.  HER2 is still pending.  She is seeing radiation and wishes to defer this till beginning of September due to social events that are planned.    Interval history 10/5/2023: HER2 came back 1+ negative.  Oncotype DX recurrence score was 18.  Based on this chemotherapy was not recommended and she proceeded with radiation  therapy which she tolerated very well.  She has been extremely physically active and ran [employer] before radiation therapy.  Her periods have been regular and shorter than previously and she missed her most recent cycle.  She denies any significant hot flashes or night sweats however.    Interval history 12/14/2023: She started tamoxifen in October 2023.  She has a feeling of being very hot at night which requires her to remove all her covers and then gets cold.  This happens most nights and is interrupting her sleep.  She does not have hot flashes during the day.  She had a vaginal discharge in the first few weeks and this has abated.  Otherwise she has no symptoms referable to tamoxifen.  Of note she has not had a period in several months now.    Interval history 5/30/2024: She is still having trouble with lymphedema and pain in the left axilla.  She is seeing Ela for treatments intermittently.  She has gained 10 pounds in the last 6 months and does not feel as comfortable in her body.  She did have spotting which is gone on intermittently this month with cramping.  These were the first menstrual related symptoms in several months.  Otherwise she is tolerating tamoxifen relatively well.  No severe hot flashes or night sweats.  She tried oxybutynin but briefly but discontinued.  Diagnostic mammogram on 5/14/2024 was negative.    Interval history 11/26/2024 ([facility] Durbin): Patient returns to clinic today for follow-up appointment. Patient denies any changes in her breast, including any new lumps or masses, skin dimpling, puckering, or erythema, nipple retraction or discharge, or any breast tenderness/discomfort.  She has followed up with her gynecologist Dr. Odonnell who recommends continuing on the tamoxifen at this time, and repeating her pelvic ultrasound in 6 months.  Patient states the ultrasound is scheduled for April 2025.  She denies any additional episodes of vaginal bleeding.  Patient  reports that she does continue to have a left serious joint aches and pains while on the tamoxifen.  She is now seen physical therapy for help with the pain in her ankle, lower back, and shoulders.  She states this pain is so bad that it makes it very, very hard for her to exercise, and sometimes even to move.  She feels the pain may be related to the tamoxifen, and is unsure she can continue with this medication.  She also reports some ongoing tenderness at the incision site, as well as along the left side of her breast.  She was seeing physical therapy for her lymphedema in her left arm, but states that is now resolved.  No other complaints or concerns provided.    [facility]2025 [facility] Patient returns to clinic today for follow-up appointment.  Patient discontinued her tamoxifen in November 2024.  Patient states her lower back pain did improve for 1 to 2 weeks, but has now since returned.  Patient states that the pain might even be a little worse than before stopping the tamoxifen.  Patient states that she did notice crusting along her left nipple which she thinks may possibly be nipple discharge.  She did experience nipple discharge/flaking of the left nipple prior to her breast cancer diagnosis.  Patient is also very concerned as she is a long-distance runner, and has had trouble completing her run since starting the tamoxifen.  She states she saw a , who recommended she be given iron to help with her endurance.  She continues to have ongoing tenderness along the left breast incision site, as well as along the left side of her breast.  She has seen physical therapy in the past for this discomfort, which has not really helped.  She is looking forward to her upcoming month-long trip to [facility] which is scheduled for next week.  No other complaints or concerns provided.      Treatment history:  6/27/2023 left breast partial mastectomy and sentinel lymph node  biopsy  9/5/2023 left breast radiation started.  October 2023 tamoxifen started -discontinued 11/26/2024 due to severe side effects          Past Medical History:   Diagnosis Date    Bronchitis     Cancer (HCC) 05/30/23    High cholesterol     Technically high but not being treated    Malignant neoplasm of upper-outer quadrant of left breast in female, estrogen receptor positive (HCC) 6/20/2023    Urinary incontinence     Sneezing, jumping       Past Surgical History:   Procedure Laterality Date    PB MASTECTOMY, PARTIAL Left 6/27/2023    Procedure: WIRE LOCALIZED LEFT PARTIAL MASTECTOMY, MASTOPEXY, LEFT SENTINEL LYMPH INJECTION WITH EXCISION OF AXILLARY NODES;  Surgeon: Antionette Au M.D.;  Location: SURGERY SAME DAY [facility];  Service: General    NV SUSPENSION OF BREAST Left 6/27/2023    Procedure: MASTOPEXY;  Surgeon: Antionette Au M.D.;  Location: SURGERY SAME DAY [facility];  Service: General    NODE BIOPSY SENTINEL Left 6/27/2023    Procedure: BIOPSY, LYMPH NODE, SENTINEL;  Surgeon: Antionette Au M.D.;  Location: SURGERY SAME DAY [facility];  Service: General    NO PERTINENT PAST SURGICAL HISTORY         Social History     Tobacco Use    Smoking status: Never     Passive exposure: Never    Smokeless tobacco: Never   Vaping Use    Vaping status: Never Used   Substance Use Topics    Alcohol use: Not Currently    Drug use: Not Currently     Types: Inhaled     Comment: Tried marijuana a few times        No family history on file.    Allergies as of 01/07/2025 - Reviewed 11/26/2024   Allergen Reaction Noted    Pcn [penicillins] Anaphylaxis 08/06/2016    Shellfish allergy Swelling 06/19/2023         Current Outpatient Medications:     Omega-3 Fatty Acids (FISH OIL) 1000 MG Cap capsule, Take 1,000 mg by mouth 3 times a day with meals., Disp: , Rfl:     tamoxifen (NOLVADEX) 20 MG tablet, Take 1 tablet by mouth daily., Disp: 60 Tablet, Rfl: 2    EPSOLAY 5 % cream, , Disp: , Rfl:     Melatonin 1 MG/4ML Liquid, , Disp:  ", Rfl:     Probiotic Product (PROBIOTIC + TURMERIC EXTRACT) 400 MG Cap, , Disp: , Rfl:     glutamine 500 mg/mL oral suspension, , Disp: , Rfl:     levothyroxine (SYNTHROID) 175 MCG Tab, , Disp: , Rfl:     fluticasone (FLONASE) 50 MCG/ACT nasal spray, Administer 1 Spray into affected nostril(S) every day., Disp: , Rfl:     VITAMIN D PO, Take 2,000 Int'l Units by mouth every day., Disp: , Rfl:     MAGNESIUM PO, Take 400 mg by mouth every day., Disp: , Rfl:     multivitamin Tab, Take 1 Tablet by mouth every day., Disp: , Rfl:     Cetirizine HCl (ZYRTEC ALLERGY PO), Take 10 mg by mouth every day., Disp: , Rfl:     levothyroxine (SYNTHROID) 150 MCG Tab, Take 175 mcg by mouth every morning on an empty stomach. 5 days per week, 150 mg 2 days per week, Disp: , Rfl:     Review of Systems:  Review of Systems   Constitutional: Negative.    HENT: Negative.     Eyes: Negative.    Respiratory: Negative.     Cardiovascular: Negative.    Gastrointestinal: Negative.    Genitourinary: Negative.    Musculoskeletal:  Positive for back pain, joint pain and myalgias.        Significant pain in her ankle, lower back, and shoulder, requiring physical therapy.  Interferes with her ability to exercise regularly.   Skin: Negative.    Neurological: Negative.    Endo/Heme/Allergies: Negative.    Psychiatric/Behavioral: Negative.            Physical Exam:    /82 (BP Location: Right arm, Patient Position: Sitting, BP Cuff Size: Adult)   Pulse 75   Temp 36.2 °C (97.2 °F) (Temporal)   Ht 1.6 m (5' 2.99\")   Wt 66.8 kg (147 lb 6 oz)   SpO2 99%   BMI 26.11 kg/m²       DESC; KARNOFSKY SCALE WITH ECOG EQUIVALENT: 100, Fully active, able to carry on all pre-disease performed without restriction (ECOG equivalent 0)    DISTRESS LEVEL: no acute distress    Physical Exam  Constitutional:       Appearance: Normal appearance. She is normal weight.   HENT:      Head: Normocephalic.      Nose: Nose normal.      Mouth/Throat:      Mouth: Mucous " membranes are moist.      Pharynx: Oropharynx is clear.   Eyes:      Extraocular Movements: Extraocular movements intact.      Conjunctiva/sclera: Conjunctivae normal.   Cardiovascular:      Rate and Rhythm: Normal rate and regular rhythm.      Pulses: Normal pulses.      Heart sounds: Normal heart sounds.   Pulmonary:      Effort: Pulmonary effort is normal.      Breath sounds: Normal breath sounds.   Chest:      Comments: 1/7/2025 normal breast exam with no visible signs of nipple dryness, flaking, or discharge.  10/26/2024 well-healed surgical incision in left breast.  No palpable lumps or masses, skin puckering or dimpling, nipple discharge or inversion, noted in either breast.    5/30/2024 mild swelling in the left axilla after radiation.  No masses nipple discharge or tenderness noted in either breast.  No lymphadenopathy.  Abdominal:      General: Abdomen is flat. Bowel sounds are normal.      Palpations: Abdomen is soft. There is no mass.   Musculoskeletal:         General: Normal range of motion.      Cervical back: Normal range of motion and neck supple.   Lymphadenopathy:      Cervical: No cervical adenopathy.      Upper Body:      Right upper body: No supraclavicular, axillary or pectoral adenopathy.      Left upper body: No supraclavicular, axillary or pectoral adenopathy.      Lower Body: No right inguinal adenopathy. No left inguinal adenopathy.   Skin:     General: Skin is warm.   Neurological:      General: No focal deficit present.      Mental Status: She is alert and oriented to person, place, and time.   Psychiatric:         Mood and Affect: Mood normal.         Behavior: Behavior normal.         Thought Content: Thought content normal.         Judgment: Judgment normal.           Labs:     Latest Reference Range & Units 12/24/24 04:15   WBC 3.4 - 10.8 x10E3/uL 5.4   RBC 3.77 - 5.28 x10E6/uL 4.19   Hemoglobin 11.1 - 15.9 g/dL 13.6   Hematocrit 34.0 - 46.6 % 41.3   MCV 79 - 97 fL 99 (H)   MCH 26.6  - 33.0 pg 32.5   MCHC 31.5 - 35.7 g/dL 32.9   RDW 11.7 - 15.4 % 12.5   Platelet Count 150 - 450 x10E3/uL 309   Immature Cells  CANCELED   Neutrophils-Polys Not Estab. % 39   Neutrophils (Absolute) 1.4 - 7.0 x10E3/uL 2.1   Lymphocytes Not Estab. % 45   Lymphs (Absolute) 0.7 - 3.1 x10E3/uL 2.4   Monocytes Not Estab. % 7   Monos (Absolute) 0.1 - 0.9 x10E3/uL 0.4   Eosinophils Not Estab. % 8   Eos (Absolute) 0.0 - 0.4 x10E3/uL 0.4   Basophils Not Estab. % 1   Baso (Absolute) 0.0 - 0.2 x10E3/uL 0.1   Immature Granulocytes Not Estab. % 0   Immature Granulocytes (abs) 0.0 - 0.1 x10E3/uL 0.0   Nucleated RBC  CANCELED   Comments-Diff  CANCELED   Iron 27 - 159 ug/dL 90   Total Iron Binding 250 - 450 ug/dL 348   % Saturation 15 - 55 % 26   Unsat Iron Binding 131 - 425 ug/dL 258   Folate -Folic Acid >3.0 ng/mL 18.8   Vitamin B12 -True Cobalamin 232 - 1,245 pg/mL 1,126   TSH 0.450 - 4.500 uIU/mL 4.290   Free T-4 0.82 - 1.77 ng/dL 1.52   (H): Data is abnormally high     Latest Reference Range & Units 24 10:45   Ferritin 15 - 150 ng/mL 60       Imagin2024 Bilateral tomosynthesis diagnostic mammography   1.  No mammographic evidence of malignancy.  2.  Postsurgical architectural distortion noted in the left breast.  3.  Recommend continued annual mammography.  R2 - CATEGORY 2: BENIGN FINDING(S)    10/24/2024 Transabdominal and transvaginal pelvic ultrasound.   1.  Left adnexal cyst measuring up to 3.9 cm. Recommend follow-up in 3-6 months.  2.  Nonspecific heterogeneity of the myometrium.  3.  Endometrial stripe within normal limits.    Pathology:    2023 At least microinvasive ductal carcinoma in a background of DCIS of the left breast, ER positive greater than 90%, MS positive greater than 90%, Ki-67 less than 10%,  Stage I cancer (pT1c, pN0i+), grade 1.  HER2 1+ negative.  Oncotype DX recurrence score 18.      Assessment & Plan:    1.  At least microinvasive ductal carcinoma in a background of DCIS of the  left breast, ER positive greater than 90%, MO positive greater than 90%, Ki-67 less than 10%, HER2 not able to be done based on lack of tissue.  Had partial mastectomy she had stage I cancer (pT1c, pN0i+), grade 1.  HER2 1+ negative.  Oncotype DX recurrence score 18.  On tamoxifen since October 2023 with significant musculoskeletal discomfort.  Tamoxifen discontinued November 2024, without resolution of musculoskeletal discomfort.  Patient is agreeable to restarting tamoxifen at a low dose, 5 mg daily, to see if this has any effect on her musculoskeletal pain.  2. Last mammogram done 5/14/2024 BI-RADS Category 2.  3.  Incidence of possible nipple discharge with flaky/crusty skin, similar to incidence which occurred prior to diagnosis of breast cancer.  Will get left breast ultrasound for further evaluation.  3.  Uterine cramping and pelvic discharge.  Ultrasound done 10/24/2024 recommended follow-up in 3 to 6 months.  Patient to continue to follow with gynecology as recommended.  Next pelvic ultrasound scheduled for April 2025.  4.  Normal CBC and iron studies.      Plan: Resume tamoxifen 5 mg daily.  Will will get left breast ultrasound for further evaluation of possible nipple discharge. Plan for patient to return to clinic in 4 to 6 weeks to assess side effects.  Continue to follow with gynecology as recommended.  Next pelvic ultrasound planned for April 2025.  Next bilateral diagnostic mammogram due 5/15/2025.  Patient is in need of baseline DEXA scan, order placed.  Will do with next diagnostic mammogram.    Reviewed with patient strategies to help reduce the risk of breast cancer recurrence, including achieving/maintaining a healthy BMI, limiting alcoholic intake, and complete abstinence from use of tobacco products.  Also reviewed with patient signs and symptoms for which to urgently contact the clinic, including the development of any new lumps or masses, any skin puckering,dimpling or textural changes, any  new nipple inversion, or any nipple discharge.     Reviewed with patient strategies to maintain bone density, including daily calcium intake of 1200 mg and vitamin D supplement of 2000 IU.  Patient also instructed to engage in at least 30 minutes of weightbearing activity daily.     Plan of care reviewed with patient and all questions answered.  Patient verbalizes understanding, denies questions, and agrees with plan of care.  Patient instructed to call or message clinic with any questions or concerns, contact information provided.    Thank you for allowing me the privilege of caring for this patient.  If if you have any questions, please do not hesitate to reach out.  I may be contacted at [phone].    Mary Grissom, MSN, AOCNP, FNP-C    Please note that this dictation was created using voice recognition software. I have made every reasonable attempt to correct obvious errors, but I expect that there are errors of grammar, and possibly content, that I did not discover before finalizing the note.     Mary Grissom, GRACIELA.

## 2025-01-09 DIAGNOSIS — Z17.0 MALIGNANT NEOPLASM OF UPPER-OUTER QUADRANT OF LEFT BREAST IN FEMALE, ESTROGEN RECEPTOR POSITIVE (HCC): ICD-10-CM

## 2025-01-09 DIAGNOSIS — Z79.810 USE OF TAMOXIFEN (NOLVADEX): ICD-10-CM

## 2025-01-09 DIAGNOSIS — N64.52 NIPPLE DISCHARGE IN FEMALE: ICD-10-CM

## 2025-01-09 DIAGNOSIS — C50.412 MALIGNANT NEOPLASM OF UPPER-OUTER QUADRANT OF LEFT BREAST IN FEMALE, ESTROGEN RECEPTOR POSITIVE (HCC): ICD-10-CM

## 2025-01-29 ENCOUNTER — TELEPHONE (OUTPATIENT)
Dept: HEMATOLOGY ONCOLOGY | Facility: MEDICAL CENTER | Age: 50
End: 2025-01-29
Payer: COMMERCIAL

## 2025-01-29 NOTE — TELEPHONE ENCOUNTER
Contacted [facility] per Mary Grissom's request to add additional dx code E55.9 to pts vitamin D test drawn in December.       [facility] resubmitted with addtl dx code, instructed to advise pt to wait 30-45days for reprocessing and disregard current bill.     Notified pt via phone call, pt understood.

## 2025-01-31 ENCOUNTER — HOSPITAL ENCOUNTER (OUTPATIENT)
Dept: RADIOLOGY | Facility: MEDICAL CENTER | Age: 50
End: 2025-01-31
Payer: COMMERCIAL

## 2025-01-31 DIAGNOSIS — Z79.810 USE OF TAMOXIFEN (NOLVADEX): ICD-10-CM

## 2025-01-31 DIAGNOSIS — C50.412 MALIGNANT NEOPLASM OF UPPER-OUTER QUADRANT OF LEFT BREAST IN FEMALE, ESTROGEN RECEPTOR POSITIVE (HCC): ICD-10-CM

## 2025-01-31 DIAGNOSIS — Z17.0 MALIGNANT NEOPLASM OF UPPER-OUTER QUADRANT OF LEFT BREAST IN FEMALE, ESTROGEN RECEPTOR POSITIVE (HCC): ICD-10-CM

## 2025-01-31 DIAGNOSIS — N64.52 NIPPLE DISCHARGE IN FEMALE: ICD-10-CM

## 2025-01-31 PROCEDURE — 77080 DXA BONE DENSITY AXIAL: CPT

## 2025-01-31 PROCEDURE — 76642 ULTRASOUND BREAST LIMITED: CPT | Mod: LT

## 2025-01-31 PROCEDURE — G0279 TOMOSYNTHESIS, MAMMO: HCPCS | Mod: LT

## 2025-02-06 ASSESSMENT — ENCOUNTER SYMPTOMS
GASTROINTESTINAL NEGATIVE: 1
NEUROLOGICAL NEGATIVE: 1
EYES NEGATIVE: 1
BACK PAIN: 1
MYALGIAS: 1
PSYCHIATRIC NEGATIVE: 1
CARDIOVASCULAR NEGATIVE: 1
RESPIRATORY NEGATIVE: 1
CONSTITUTIONAL NEGATIVE: 1

## 2025-02-06 NOTE — PROGRESS NOTES
Medical oncology follow-up visit:       This evaluation was conducted via Teams using secure and encrypted videoconferencing technology. The patient was in their home in the [facility].    The patient's identity was confirmed and verbal consent was obtained for this virtual visit.       Referring Physician:  Cynthia Sanders M.D.  Primary Care:  Cynthia Sanders M.D.    Diagnosis: At least microinvasive ductal carcinoma    Chief Complaint: Newly diagnosed at least microinvasive ductal carcinoma    History of Presenting Illness:  Evelyn Mullins is a 48 y.o. premenopausal white female who had a routine mammogram on 2/23/2023 that showed indeterminate calcifications in the left breast BI-RADS 4A.  On 5/26/2023 she underwent a stereotactic biopsy of this area which showed DCIS with at least microinvasive ductal carcinoma, grade 2, ER positive greater than 90%, ME positive greater than 90%, Ki-67 less than 10%, HER2 indeterminate as there was not enough tissue to make a diagnosis.  She has seen Dr. Au in consultation and will undergo partial mastectomy and sentinel lymph node biopsy next week.  She is nulliparous with a somewhat irregular.  But no hot flashes.  No family history of breast cancer.  Genetic testing is pending.  She is extremely healthy other than hypothyroidism well controlled and runs ultramarathons.    Interval history 7/12/2023.  She underwent partial mastectomy and sentinel lymph node biopsy on 6/27/2023.  Pathology showed invasive ductal carcinoma, grade 1, with extensive intermediate to high-grade DCIS.  The invasive tumor measured 1.6 cm in greatest dimension.  Final margins were clear both invasive and noninvasive tumor.  1 lymph node was positive for isolated tumor cells in 1 lymph node was benign.  Postoperative course was unremarkable and she was back doing full exercise within a week after surgery.  HER2 is still pending.  She is seeing radiation and wishes to defer this  till beginning of September due to social events that are planned.    Interval history 10/5/2023: HER2 came back 1+ negative.  Oncotype DX recurrence score was 18.  Based on this chemotherapy was not recommended and she proceeded with radiation therapy which she tolerated very well.  She has been extremely physically active and ran [employer] before radiation therapy.  Her periods have been regular and shorter than previously and she missed her most recent cycle.  She denies any significant hot flashes or night sweats however.    Interval history 12/14/2023: She started tamoxifen in October 2023.  She has a feeling of being very hot at night which requires her to remove all her covers and then gets cold.  This happens most nights and is interrupting her sleep.  She does not have hot flashes during the day.  She had a vaginal discharge in the first few weeks and this has abated.  Otherwise she has no symptoms referable to tamoxifen.  Of note she has not had a period in several months now.    Interval history 5/30/2024: She is still having trouble with lymphedema and pain in the left axilla.  She is seeing Ela for treatments intermittently.  She has gained 10 pounds in the last 6 months and does not feel as comfortable in her body.  She did have spotting which is gone on intermittently this month with cramping.  These were the first menstrual related symptoms in several months.  Otherwise she is tolerating tamoxifen relatively well.  No severe hot flashes or night sweats.  She tried oxybutynin but briefly but discontinued.  Diagnostic mammogram on 5/14/2024 was negative.    Interval history 11/26/2024 (Mary Grissom, BJ): Patient returns to clinic today for follow-up appointment. Patient denies any changes in her breast, including any new lumps or masses, skin dimpling, puckering, or erythema, nipple retraction or discharge, or any breast tenderness/discomfort.  She has followed up with her gynecologist   Blas who recommends continuing on the tamoxifen at this time, and repeating her pelvic ultrasound in 6 months.  Patient states the ultrasound is scheduled for April 2025.  She denies any additional episodes of vaginal bleeding.  Patient reports that she does continue to have a left serious joint aches and pains while on the tamoxifen.  She is now seen physical therapy for help with the pain in her ankle, lower back, and shoulders.  She states this pain is so bad that it makes it very, very hard for her to exercise, and sometimes even to move.  She feels the pain may be related to the tamoxifen, and is unsure she can continue with this medication.  She also reports some ongoing tenderness at the incision site, as well as along the left side of her breast.  She was seeing physical therapy for her lymphedema in her left arm, but states that is now resolved.  No other complaints or concerns provided.    Interval history 1/7/2025 (Mary Grissom, BJ): Patient returns to clinic today for follow-up appointment.  Patient discontinued her tamoxifen in November 2024.  Patient states her lower back pain did improve for 1 to 2 weeks, but has now since returned.  Patient states that the pain might even be a little worse than before stopping the tamoxifen.  Patient states that she did notice crusting along her left nipple which she thinks may possibly be nipple discharge.  She did experience nipple discharge/flaking of the left nipple prior to her breast cancer diagnosis.  Patient is also very concerned as she is a long-distance runner, and has had trouble completing her run since starting the tamoxifen.  She states she saw a , who recommended she be given iron to help with her endurance.  She continues to have ongoing tenderness along the left breast incision site, as well as along the left side of her breast.  She has seen physical therapy in the past for this discomfort, which has not really helped.   She is looking forward to her upcoming month-long trip to [facility] which is scheduled for next week.  No other complaints or concerns provided.      Interval history 2/7/2025 (Mary Grissom, JANICEPACHECO): Patient returns to clinic today for follow-up appointment via virtual visit.  Left breast diagnostic mammogram and ultrasound done 1/31/2025 was BI-RADS Category 2 benign.  DEXA scan done 1/31/2025 was normal.  Patient did return from a month-long trip to [facility], which she said was a wonderful vacation.  Patient restarted her tamoxifen at 5 mg daily on 1/10/2025.  Patient has now noted the return of night sweats, hot flashes, and significantly worsening lower back pain.  She states that when she stopped the full-strength tamoxifen the back pain improved, but did not completely dissipate.  Upon resumption of the low-dose tamoxifen, the back pain has again worsened.  She is again unable to run, do yoga, and participate in sports which she enjoys.  She continues to follow with her physical therapist, and has undergone reimaging of her lower back.  No source of her lower back pain has been identified.  Patient is extremely frustrated, as she does wish to remain on the tamoxifen to help lower her chance of cancer recurrence, however she states she is unable to continue on the medication for the next several years if her back pain continues as it is, and the pain continues to impact her life, by limiting her activities.    Treatment history:  6/27/2023 left breast partial mastectomy and sentinel lymph node biopsy  9/5/2023 left breast radiation started.  October 2023 tamoxifen started -discontinued 11/26/2024 due to severe side effects  1/10/2025 tamoxifen resumed at 5 mg daily, stopped to 2/7/2025 due to recurrent, worsening back pain          Past Medical History:   Diagnosis Date    Bronchitis     Cancer (HCC) 05/30/23    High cholesterol     Technically high but not being treated    Malignant neoplasm of upper-outer  quadrant of left breast in female, estrogen receptor positive (HCC) 6/20/2023    Urinary incontinence     Sneezing, jumping       Past Surgical History:   Procedure Laterality Date    PB MASTECTOMY, PARTIAL Left 6/27/2023    Procedure: WIRE LOCALIZED LEFT PARTIAL MASTECTOMY, MASTOPEXY, LEFT SENTINEL LYMPH INJECTION WITH EXCISION OF AXILLARY NODES;  Surgeon: Antionette Au M.D.;  Location: SURGERY SAME DAY [facility];  Service: General    UT SUSPENSION OF BREAST Left 6/27/2023    Procedure: MASTOPEXY;  Surgeon: Antionette Au M.D.;  Location: SURGERY SAME DAY [facility];  Service: General    NODE BIOPSY SENTINEL Left 6/27/2023    Procedure: BIOPSY, LYMPH NODE, SENTINEL;  Surgeon: Antionette Au M.D.;  Location: SURGERY SAME DAY [facility];  Service: General    NO PERTINENT PAST SURGICAL HISTORY         Social History     Tobacco Use    Smoking status: Never     Passive exposure: Never    Smokeless tobacco: Never   Vaping Use    Vaping status: Never Used   Substance Use Topics    Alcohol use: Not Currently    Drug use: Not Currently     Types: Inhaled     Comment: Tried marijuana a few times        History reviewed. No pertinent family history.    Allergies as of 02/07/2025 - Reviewed 02/06/2025   Allergen Reaction Noted    Pcn [penicillins] Anaphylaxis 08/06/2016    Shellfish allergy Swelling 06/19/2023         Current Outpatient Medications:     tamoxifen (NOLVADEX) 10 MG Tab, Take 0.5 Tablets by mouth every day., Disp: 30 Tablet, Rfl: 3    Omega-3 Fatty Acids (FISH OIL) 1000 MG Cap capsule, Take 1,000 mg by mouth 3 times a day with meals., Disp: , Rfl:     EPSOLAY 5 % cream, , Disp: , Rfl:     Melatonin 1 MG/4ML Liquid, , Disp: , Rfl:     Probiotic Product (PROBIOTIC + TURMERIC EXTRACT) 400 MG Cap, , Disp: , Rfl:     glutamine 500 mg/mL oral suspension, , Disp: , Rfl:     levothyroxine (SYNTHROID) 175 MCG Tab, , Disp: , Rfl:     fluticasone (FLONASE) 50 MCG/ACT nasal spray, Administer 1 Spray into affected nostril(S)  every day., Disp: , Rfl:     VITAMIN D PO, Take 2,000 Int'l Units by mouth every day., Disp: , Rfl:     MAGNESIUM PO, Take 400 mg by mouth every day., Disp: , Rfl:     multivitamin Tab, Take 1 Tablet by mouth every day., Disp: , Rfl:     Cetirizine HCl (ZYRTEC ALLERGY PO), Take 10 mg by mouth every day., Disp: , Rfl:     levothyroxine (SYNTHROID) 150 MCG Tab, Take 175 mcg by mouth every morning on an empty stomach. 5 days per week, 150 mg 2 days per week, Disp: , Rfl:     Review of Systems:  Review of Systems   Constitutional: Negative.    HENT: Negative.     Eyes: Negative.    Respiratory: Negative.     Cardiovascular: Negative.    Gastrointestinal: Negative.    Genitourinary: Negative.    Musculoskeletal:  Positive for back pain, joint pain and myalgias.        Significant pain in her ankle, lower back, and shoulder, requiring physical therapy.  Interferes with her ability to exercise regularly.   Skin: Negative.    Neurological: Negative.    Endo/Heme/Allergies: Negative.    Psychiatric/Behavioral: Negative.            Physical Exam:    There were no vitals taken for this visit.      No physical exam performed today as this visit was conducted via virtual platform.  Physical exam from last visit as below:    DESC; KARNOFSKY SCALE WITH ECOG EQUIVALENT: 100, Fully active, able to carry on all pre-disease performed without restriction (ECOG equivalent 0)    DISTRESS LEVEL: no acute distress    Physical Exam  Constitutional:       Appearance: Normal appearance. She is normal weight.   HENT:      Head: Normocephalic.      Nose: Nose normal.      Mouth/Throat:      Mouth: Mucous membranes are moist.      Pharynx: Oropharynx is clear.   Eyes:      Extraocular Movements: Extraocular movements intact.      Conjunctiva/sclera: Conjunctivae normal.   Cardiovascular:      Rate and Rhythm: Normal rate and regular rhythm.      Pulses: Normal pulses.      Heart sounds: Normal heart sounds.   Pulmonary:      Effort: Pulmonary  effort is normal.      Breath sounds: Normal breath sounds.   Chest:      Comments: 1/7/2025 normal breast exam with no visible signs of nipple dryness, flaking, or discharge.  10/26/2024 well-healed surgical incision in left breast.  No palpable lumps or masses, skin puckering or dimpling, nipple discharge or inversion, noted in either breast.    5/30/2024 mild swelling in the left axilla after radiation.  No masses nipple discharge or tenderness noted in either breast.  No lymphadenopathy.  Abdominal:      General: Abdomen is flat. Bowel sounds are normal.      Palpations: Abdomen is soft. There is no mass.   Musculoskeletal:         General: Normal range of motion.      Cervical back: Normal range of motion and neck supple.   Lymphadenopathy:      Cervical: No cervical adenopathy.      Upper Body:      Right upper body: No supraclavicular, axillary or pectoral adenopathy.      Left upper body: No supraclavicular, axillary or pectoral adenopathy.      Lower Body: No right inguinal adenopathy. No left inguinal adenopathy.   Skin:     General: Skin is warm.   Neurological:      General: No focal deficit present.      Mental Status: She is alert and oriented to person, place, and time.   Psychiatric:         Mood and Affect: Mood normal.         Behavior: Behavior normal.         Thought Content: Thought content normal.         Judgment: Judgment normal.           Labs:     Latest Reference Range & Units 12/24/24 04:15   WBC 3.4 - 10.8 x10E3/uL 5.4   RBC 3.77 - 5.28 x10E6/uL 4.19   Hemoglobin 11.1 - 15.9 g/dL 13.6   Hematocrit 34.0 - 46.6 % 41.3   MCV 79 - 97 fL 99 (H)   MCH 26.6 - 33.0 pg 32.5   MCHC 31.5 - 35.7 g/dL 32.9   RDW 11.7 - 15.4 % 12.5   Platelet Count 150 - 450 x10E3/uL 309   Immature Cells  CANCELED   Neutrophils-Polys Not Estab. % 39   Neutrophils (Absolute) 1.4 - 7.0 x10E3/uL 2.1   Lymphocytes Not Estab. % 45   Lymphs (Absolute) 0.7 - 3.1 x10E3/uL 2.4   Monocytes Not Estab. % 7   Monos (Absolute) 0.1  - 0.9 x10E3/uL 0.4   Eosinophils Not Estab. % 8   Eos (Absolute) 0.0 - 0.4 x10E3/uL 0.4   Basophils Not Estab. % 1   Baso (Absolute) 0.0 - 0.2 x10E3/uL 0.1   Immature Granulocytes Not Estab. % 0   Immature Granulocytes (abs) 0.0 - 0.1 x10E3/uL 0.0   Nucleated RBC  CANCELED   Comments-Diff  CANCELED   Iron 27 - 159 ug/dL 90   Total Iron Binding 250 - 450 ug/dL 348   % Saturation 15 - 55 % 26   Unsat Iron Binding 131 - 425 ug/dL 258   Folate -Folic Acid >3.0 ng/mL 18.8   Vitamin B12 -True Cobalamin 232 - 1,245 pg/mL 1,126   TSH 0.450 - 4.500 uIU/mL 4.290   Free T-4 0.82 - 1.77 ng/dL 1.52   (H): Data is abnormally high     Latest Reference Range & Units 24 10:45   Ferritin 15 - 150 ng/mL 60       Imagin2024 Bilateral tomosynthesis diagnostic mammography   1.  No mammographic evidence of malignancy.  2.  Postsurgical architectural distortion noted in the left breast.  3.  Recommend continued annual mammography.  R2 - CATEGORY 2: BENIGN FINDING(S)    10/24/2024 Transabdominal and transvaginal pelvic ultrasound.   1.  Left adnexal cyst measuring up to 3.9 cm. Recommend follow-up in 3-6 months.  2.  Nonspecific heterogeneity of the myometrium.  3.  Endometrial stripe within normal limits.    2025 Unilateral Left (accession [id number]), LEFT (accession [id number]) tomosynthesis diagnostic mammography  1.  Heterogeneously dense left breast with post treatment change with no mammographic evidence of malignancy or ultrasound explanation for nipple discharge.  2.  A negative radiographic workup of a clinically suspicious finding should not prevent further evaluation based on physical findings.  3.  Recommend clinical follow-up, monthly self breast exam and annual mammography.  BI-RADS Category: R2 - CATEGORY 2: BENIGN FINDING(S)    2025 Dexa  -The lumbar spine has a mean bone mineral density of 1.177 g/cm2, with a T score of 0.1 and a Z score of 0.5.   -The proximal left femur has a mean bone mineral  density of 0.891 g/cm2, with a T score of -0.9 and a Z score of -0.5.  -Bone mineral density of this patient is normal in the spine and normal in the proximal left femur.       Pathology:    6/27/2023 At least microinvasive ductal carcinoma in a background of DCIS of the left breast, ER positive greater than 90%, MD positive greater than 90%, Ki-67 less than 10%,  Stage I cancer (pT1c, pN0i+), grade 1.  HER2 1+ negative.  Oncotype DX recurrence score 18.      Assessment & Plan:    1.  At least microinvasive ductal carcinoma in a background of DCIS of the left breast, ER positive greater than 90%, MD positive greater than 90%, Ki-67 less than 10%, HER2 not able to be done based on lack of tissue.  Had partial mastectomy she had stage I cancer (pT1c, pN0i+), grade 1.  HER2 1+ negative.  Oncotype DX recurrence score 18.  On tamoxifen since October 2023 with significant musculoskeletal discomfort.  Tamoxifen discontinued November 2024, without complete resolution of musculoskeletal discomfort.  Patient is agreeable to restarting tamoxifen at a low dose, 5 mg daily, to see if this has any effect on her musculoskeletal pain.  After restarting tamoxifen 5 mg daily, patient again experienced significant lower back pain which limits her ability to run, do yoga, and engage in sports which she enjoys.  2. Last mammogram done 1/31/2025 BI-RADS Category 2.  3.  Uterine cramping and pelvic discharge.  Ultrasound done 10/24/2024 recommended follow-up in 3 to 6 months.  Patient to continue to follow with gynecology as recommended.  Next pelvic ultrasound scheduled for April 2025.  4.  Normal CBC and iron studies.  5.  DEXA scan done 1/31/2025 normal      Again reviewed in great detail with patient risks, benefits, potential side effects of tamoxifen use.  Reviewed with patient that she did already have treatment for her cancer, but that the tamoxifen is an adjuvant treatment to help decrease the chance of the cancer returning in  the next 10 years.  Again reviewed with patient that the blockage of estrogen receptors, created by the tamoxifen use, could contribute to worsening muscle and joint aches.  Discussed with patient that Effexor 37.5 mg has helped many patients deal with the side effects of the lower estrogen levels, but that it is usually most effective with such symptoms as hot flashes and mood swings.  Patient verbalizes understanding, and states she does not wish to add additional medication at this time.  Through shared decision making, patient has decided to stop the tamoxifen and see if her back pain improves.  We will also send her to pain management for additional evaluation and possible recommendation of other complementary pain management options.    Plan: Return to clinic in July 2025 for next follow-up appointment.  Patient will reach out via phone in 2 weeks to update me as to how her pain is off the tamoxifen.  Will place referral to pain management for additional evaluation of lower back pain.    Next pelvic ultrasound planned for April 2025.  Next bilateral diagnostic mammogram due 2/1/2026.   Next DEXA scan due 2/1/2027.    Reviewed with patient strategies to help reduce the risk of breast cancer recurrence, including achieving/maintaining a healthy BMI, limiting alcoholic intake, and complete abstinence from use of tobacco products.  Also reviewed with patient signs and symptoms for which to urgently contact the clinic, including the development of any new lumps or masses, any skin puckering,dimpling or textural changes, any new nipple inversion, or any nipple discharge.     Reviewed with patient strategies to maintain bone density, including daily calcium intake of 1200 mg and vitamin D supplement of 2000 IU.  Patient also instructed to engage in at least 30 minutes of weightbearing activity daily.     Plan of care reviewed with patient and all questions answered.  Patient verbalizes understanding, denies  questions, and agrees with plan of care.  Patient instructed to call or message clinic with any questions or concerns, contact information provided.    Thank you for allowing me the privilege of caring for this patient.  If if you have any questions, please do not hesitate to reach out.  I may be contacted at [phone].    Mary Grissom, MSN, AOCNP, FNP-C    Please note that this dictation was created using voice recognition software. I have made every reasonable attempt to correct obvious errors, but I expect that there are errors of grammar, and possibly content, that I did not discover before finalizing the note.     Mary Grissom, GRACIELA.

## 2025-02-07 ENCOUNTER — HOSPITAL ENCOUNTER (OUTPATIENT)
Dept: HEMATOLOGY ONCOLOGY | Facility: MEDICAL CENTER | Age: 50
End: 2025-02-07
Payer: COMMERCIAL

## 2025-02-07 DIAGNOSIS — C50.412 MALIGNANT NEOPLASM OF UPPER-OUTER QUADRANT OF LEFT BREAST IN FEMALE, ESTROGEN RECEPTOR POSITIVE (HCC): ICD-10-CM

## 2025-02-07 DIAGNOSIS — M54.50 CHRONIC MIDLINE LOW BACK PAIN WITHOUT SCIATICA: ICD-10-CM

## 2025-02-07 DIAGNOSIS — Z17.0 MALIGNANT NEOPLASM OF UPPER-OUTER QUADRANT OF LEFT BREAST IN FEMALE, ESTROGEN RECEPTOR POSITIVE (HCC): ICD-10-CM

## 2025-02-07 DIAGNOSIS — G89.29 CHRONIC MIDLINE LOW BACK PAIN WITHOUT SCIATICA: ICD-10-CM

## 2025-02-07 PROCEDURE — 99213 OFFICE O/P EST LOW 20 MIN: CPT | Mod: 95

## 2025-02-13 ENCOUNTER — OFFICE VISIT (OUTPATIENT)
Dept: PHYSICAL MEDICINE AND REHAB | Facility: MEDICAL CENTER | Age: 50
End: 2025-02-13
Payer: COMMERCIAL

## 2025-02-13 VITALS
HEART RATE: 98 BPM | SYSTOLIC BLOOD PRESSURE: 113 MMHG | HEIGHT: 63 IN | BODY MASS INDEX: 25.78 KG/M2 | DIASTOLIC BLOOD PRESSURE: 81 MMHG | OXYGEN SATURATION: 98 % | WEIGHT: 145.5 LBS | TEMPERATURE: 98 F

## 2025-02-13 DIAGNOSIS — M48.061 LUMBAR STENOSIS WITHOUT NEUROGENIC CLAUDICATION: ICD-10-CM

## 2025-02-13 DIAGNOSIS — M54.50 CHRONIC BILATERAL LOW BACK PAIN WITHOUT SCIATICA: ICD-10-CM

## 2025-02-13 DIAGNOSIS — M51.369 ANNULAR TEAR OF LUMBAR DISC: ICD-10-CM

## 2025-02-13 DIAGNOSIS — M47.819 FACET ARTHROPATHY: ICD-10-CM

## 2025-02-13 DIAGNOSIS — G89.29 CHRONIC BILATERAL LOW BACK PAIN WITHOUT SCIATICA: ICD-10-CM

## 2025-02-13 PROCEDURE — 99215 OFFICE O/P EST HI 40 MIN: CPT | Performed by: STUDENT IN AN ORGANIZED HEALTH CARE EDUCATION/TRAINING PROGRAM

## 2025-02-13 PROCEDURE — 1125F AMNT PAIN NOTED PAIN PRSNT: CPT | Performed by: STUDENT IN AN ORGANIZED HEALTH CARE EDUCATION/TRAINING PROGRAM

## 2025-02-13 PROCEDURE — 3079F DIAST BP 80-89 MM HG: CPT | Performed by: STUDENT IN AN ORGANIZED HEALTH CARE EDUCATION/TRAINING PROGRAM

## 2025-02-13 PROCEDURE — 3074F SYST BP LT 130 MM HG: CPT | Performed by: STUDENT IN AN ORGANIZED HEALTH CARE EDUCATION/TRAINING PROGRAM

## 2025-02-13 PROCEDURE — 99417 PROLNG OP E/M EACH 15 MIN: CPT | Performed by: STUDENT IN AN ORGANIZED HEALTH CARE EDUCATION/TRAINING PROGRAM

## 2025-02-13 RX ORDER — GABAPENTIN 300 MG/1
CAPSULE ORAL
Qty: 105 CAPSULE | Refills: 0 | Status: SHIPPED | OUTPATIENT
Start: 2025-02-13 | End: 2025-03-27

## 2025-02-13 ASSESSMENT — PAIN SCALES - GENERAL: PAINLEVEL_OUTOF10: 6=MODERATE PAIN

## 2025-02-13 ASSESSMENT — PATIENT HEALTH QUESTIONNAIRE - PHQ9
CLINICAL INTERPRETATION OF PHQ2 SCORE: 2
5. POOR APPETITE OR OVEREATING: 0 - NOT AT ALL
SUM OF ALL RESPONSES TO PHQ QUESTIONS 1-9: 6

## 2025-02-13 ASSESSMENT — FIBROSIS 4 INDEX: FIB4 SCORE: 0.94

## 2025-02-13 NOTE — Clinical Note
Abraham Hernandez,  Thank you for referring Boogie.  In my opinion, her pain is musculoskeletal in nature, possibly from her lumbar facets on her left, and from her sacroiliac joint on her right. She will get an updated MRI first and then we will likely pursue injections.  I think there is a good chance that her pain will improve with injections.  I also started a trial of gabapentin for postradiation pain.  She would like to avoid Cymbalta at this time due to possible side effects with weaning the medicine.  I will continue to follow her.  Thanks, Doris Nevarez MD Interventional [facility] Sp[facility] Physical Medicine and Rehabilitation [facility]

## 2025-02-13 NOTE — PROGRESS NOTES
Verbal consent was acquired by the patient to use [employer] ambient listening note generation during this visit Yes     New Patient Note    Interventional Pain and Spine  Physiatry (Physical Medicine and Rehabilitation)     Patient Name: Evelyn Mullins  : 1975  Date of Service: 2025  PCP: Cynthia Sanders M.D.  Referring Provider: Mary Grissom A.P.R.N.    Chief Complaint:   Chief Complaint   Patient presents with    New Patient     Chronic midline low back pain       HPI  HISTORY FROM INITIAL VISIT (2025):    History of Present Illness  Evelyn Mullins is a 50-year-old female who presents for a new patient visit.    She reports experiencing back pain, which began in early  as left-sided discomfort, initially attributed to her piriformis muscle. The pain radiated down her leg, prompting her to seek physical therapy. Despite several months of therapy, her condition did not improve, leading her to consult with Dr. Maciel. A pelvic and lumbar MRI was performed, but no specific diagnosis was made. An injection was suggested, but she declined. Instead, she opted for acupuncture, which significantly alleviated her symptoms after three sessions. However, in , she began experiencing generalized back pain. In 2024, she sustained a left Achilles injury, which was managed by her physical therapist. As her Achilles injury improved, her lower back pain worsened, particularly with rotational movements. Her physical therapist identified instabilities in her lower lumbar facet joints or SI joint, and they have been focusing on core stability without significant progress.      Her movement patterns have been distorted due to her back issues. She reports no radiating pain down her right leg but feels discomfort in her hip. She has not been able to correlate her pain with any specific activities. She finds it difficult to rise from a chair and experiences pain upon waking up in the  morning. Prolonged sitting followed by standing exacerbates her pain, but walking provides some relief. She occasionally experiences tingling in her left leg, but it is less frequent and intense than when it first started in January 2023. She reports no weakness, numbness, or tingling in her legs. She has tried acupuncture again this fall, focusing on her back, but it did not help.     She has been off tamoxifen for 6 weeks since November 2024, during which she experienced a brief period of relief. She resumed tamoxifen at a lower dose in January 2025 but discontinued it a week ago. Her oncologist suggested a possible link between tamoxifen and her symptoms, however she denies significant pain all over with resuming tamoxifen.    She reports constant discomfort at her left torso and upper extremity after surgery and radiation for breast cancer, which is present even at rest.    Her physical therapist at [facility] has been dry needling her lower lumbar and SI joint. She finds that consciously engaging her abs and standing up reduces her pain. She takes ibuprofen 200 mg as needed for pain relief. She has not tried gabapentin or duloxetine for her radiation-induced pain. She is considering Cymbalta as suggested by her primary care physician. She is not interested in taking Effexor due to possible side effects. She has a [facility] MRI scheduled [facility]. She reports no pregnancy, diabetes, pacemaker or wiring, or claustrophobia.    MEDICATIONS  Current: tamoxifen, ibuprofen      Psychological testing for pain as depression and pain commonly coexist and need to both be treated.     Opioid Risk Score: 0      Interpretation of Opioid Risk Score   Score 0-3 = Low risk of abuse. Do UDS at least once per year.  Score 4-7 = Moderate risk of abuse. Do UDS 1-4 times per year.  Score 8+ = High risk of abuse. Refer to specialist.    PHQ      2/13/2025     9:40 AM   Depression Screen (PHQ-2/PHQ-9)   PHQ-2 Total Score 2    PHQ-9 Total Score 6       Interpretation of PHQ-9 Total Score   Score Severity   1-4 No Depression   5-9 Mild Depression   10-14 Moderate Depression   15-19 Moderately Severe Depression   20-27 Severe Depression      Medical records review:  I reviewed the note from the referring provider Mary Grissom A.P.R.N.     ROS:   Red Flags ROS:   Fever, Chills, Sweats: Denies  Involuntary Weight Loss: Denies  Bladder Incontinence: Denies  Bowel Incontinence: denies  Saddle Anesthesia: Denies    All other systems reviewed and negative.     PMHx:   Past Medical History:   Diagnosis Date    Bronchitis     Cancer (HCC) 05/30/23    High cholesterol     Technically high but not being treated    Malignant neoplasm of upper-outer quadrant of left breast in female, estrogen receptor positive (HCC) 6/20/2023    Urinary incontinence     Sneezing, jumping       PSHx:   Past Surgical History:   Procedure Laterality Date    PB MASTECTOMY, PARTIAL Left 6/27/2023    Procedure: WIRE LOCALIZED LEFT PARTIAL MASTECTOMY, MASTOPEXY, LEFT SENTINEL LYMPH INJECTION WITH EXCISION OF AXILLARY NODES;  Surgeon: Antionette Au M.D.;  Location: SURGERY SAME DAY [facility];  Service: General    OK SUSPENSION OF BREAST Left 6/27/2023    Procedure: MASTOPEXY;  Surgeon: Antionette Au M.D.;  Location: SURGERY SAME DAY [facility];  Service: General    NODE BIOPSY SENTINEL Left 6/27/2023    Procedure: BIOPSY, LYMPH NODE, SENTINEL;  Surgeon: Antionette Au M.D.;  Location: SURGERY SAME DAY [facility];  Service: General    NO PERTINENT PAST SURGICAL HISTORY         Family Hx:   History reviewed. No pertinent family history.    Social Hx:  Social History     Socioeconomic History    Marital status: Single     Spouse name: Not on file    Number of children: Not on file    Years of education: Not on file    Highest education level: Not on file   Occupational History    Not on file   Tobacco Use    Smoking status: Never     Passive exposure: Never    Smokeless  tobacco: Never   Vaping Use    Vaping status: Never Used   Substance and Sexual Activity    Alcohol use: Not Currently    Drug use: Not Currently     Types: Inhaled     Comment: Tried marijuana a few times    Sexual activity: Not on file   Other Topics Concern    Not on file   Social History Narrative    Not on file     Social Drivers of Health     Financial Resource Strain: Not on file   Food Insecurity: Not on file   Transportation Needs: Not on file   Physical Activity: Not on file   Stress: Not on file   Social Connections: Not on file   Intimate Partner Violence: Not on file   Housing Stability: Not on file       Allergies:  Allergies   Allergen Reactions    Pcn [Penicillins] Anaphylaxis    Shellfish Allergy Swelling       Medications: reviewed on epic.   Outpatient Medications Marked as Taking for the 2/13/25 encounter (Office Visit) with Doris Nevarez M.D.   Medication Sig Dispense Refill    gabapentin (NEURONTIN) 300 MG Cap Take 1 Capsule by mouth at bedtime for 7 days, THEN 1 Capsule 2 times a day for 7 days, THEN 1 Capsule 3 times a day for 28 days. 105 Capsule 0    tamoxifen (NOLVADEX) 10 MG Tab Take 0.5 Tablets by mouth every day. 30 Tablet 3    Omega-3 Fatty Acids (FISH OIL) 1000 MG Cap capsule Take 1,000 mg by mouth 3 times a day with meals.      EPSOLAY 5 % cream       Melatonin 1 MG/4ML Liquid       Probiotic Product (PROBIOTIC + TURMERIC EXTRACT) 400 MG Cap       glutamine 500 mg/mL oral suspension       levothyroxine (SYNTHROID) 175 MCG Tab       fluticasone (FLONASE) 50 MCG/ACT nasal spray Administer 1 Spray into affected nostril(S) every day.      VITAMIN D PO Take 2,000 Int'l Units by mouth every day.      MAGNESIUM PO Take 400 mg by mouth every day.      multivitamin Tab Take 1 Tablet by mouth every day.      Cetirizine HCl (ZYRTEC ALLERGY PO) Take 10 mg by mouth every day.          Current Outpatient Medications on File Prior to Visit   Medication Sig Dispense Refill    tamoxifen (NOLVADEX)  "10 MG Tab Take 0.5 Tablets by mouth every day. 30 Tablet 3    Omega-3 Fatty Acids (FISH OIL) 1000 MG Cap capsule Take 1,000 mg by mouth 3 times a day with meals.      EPSOLAY 5 % cream       Melatonin 1 MG/4ML Liquid       Probiotic Product (PROBIOTIC + TURMERIC EXTRACT) 400 MG Cap       glutamine 500 mg/mL oral suspension       levothyroxine (SYNTHROID) 175 MCG Tab       fluticasone (FLONASE) 50 MCG/ACT nasal spray Administer 1 Spray into affected nostril(S) every day.      VITAMIN D PO Take 2,000 Int'l Units by mouth every day.      MAGNESIUM PO Take 400 mg by mouth every day.      multivitamin Tab Take 1 Tablet by mouth every day.      Cetirizine HCl (ZYRTEC ALLERGY PO) Take 10 mg by mouth every day.      levothyroxine (SYNTHROID) 150 MCG Tab Take 175 mcg by mouth every morning on an empty stomach. 5 days per week, 150 mg 2 days per week (Patient not taking: Reported on 2/13/2025)       No current facility-administered medications on file prior to visit.         EXAMINATION     Physical Exam:   /81 (BP Location: Right arm, Patient Position: Sitting, BP Cuff Size: Adult)   Pulse 98   Temp 36.7 °C (98 °F) (Temporal)   Ht 1.6 m (5' 3\")   Wt 66 kg (145 lb 8.1 oz)   SpO2 98%     Constitutional:   Body Habitus: Body mass index is 25.77 kg/m².  Cooperation: Fully cooperates with exam  Appearance: Well-groomed, well-nourished.    Eyes: No scleral icterus to suggest severe liver disease, no proptosis to suggest severe hyperthyroidism    ENT -no obvious auditory deficits, no noticeable facial droop     Skin -no rashes or lesions noted     Respiratory-  breathing comfortably on room air, no audible wheezing    Cardiovascular-distal extremities warm and well perfused.  No lower extremity edema is noted.     Gastrointestinal - no obvious abdominal masses, non-distended    Psychiatric- alert and oriented ×3. Normal affect.     Gait - normal gait, no use of ambulatory device, nonantalgic. Heel walking and toe walking " intact.    Musculoskeletal and Neuro -       Thoracic/Lumbar Spine/Sacral Spine/Hips   Inspection: No evidence of atrophy in bilateral lower extremities throughout     There is full active range of motion with lumbar extension    Facet loading maneuver positive on left, negative on right    Palpation:   Tenderness to palpation over the paraspinal muscles bilaterally and sacroiliac joints bilaterally.     Lumbar spine /hip provocative exam maneuvers  Straight leg raise negative bilaterally  FADIR test negative bilaterally    SI joint tests  NEVA test positive bilaterally      Thigh thrust test negative bilaterally  SI joint compression positive on right, negative on left  SI joint distraction negative bilaterally  Sacral thrust test negative bilaterally  Gaenslens maneuver positive on right, negative on left      Key points for the international standards for neurological classification of spinal cord injury (ISNCSCI) to light touch.   Dermatome R L   L2 2 2   L3 2 2   L4 2 2   L5 2 2   S1 2 2   S2 2 2       Motor Exam Lower Extremities  ? Myotome R L   Hip flexion L2 5 5   Knee extension L3 5 5   Ankle dorsiflexion L4 5 5   Toe extension L5 5 5   Ankle plantarflexion S1 5 5       Reflexes  ?  R L   Patella  2+ 2+   Achilles   2+ 2+     Clonus of the ankle negative bilaterally       MEDICAL DECISION MAKING    Medical records review: see under HPI section.     DATA    Labs: Personally reviewed at today's visit:     Lab Results   Component Value Date/Time    SODIUM 141 11/27/2024 05:22 AM    SODIUM 136 06/20/2023 03:40 PM    POTASSIUM 4.4 11/27/2024 05:22 AM    POTASSIUM 3.9 06/20/2023 03:40 PM    CHLORIDE 109 (H) 11/27/2024 05:22 AM    CHLORIDE 102 06/20/2023 03:40 PM    CO2 20 11/27/2024 05:22 AM    CO2 25 06/20/2023 03:40 PM    ANION 9.0 06/20/2023 03:40 PM    GLUCOSE 86 11/27/2024 05:22 AM    GLUCOSE 83 06/20/2023 03:40 PM    BUN 22 11/27/2024 05:22 AM    BUN 18 06/20/2023 03:40 PM    CREATININE 0.99 11/27/2024  "05:22 AM    CREATININE 0.96 06/20/2023 03:40 PM    CALCIUM 9.4 11/27/2024 05:22 AM    CALCIUM 9.7 06/20/2023 03:40 PM    ASTSGOT 22 11/27/2024 05:22 AM    ASTSGOT 20 11/22/2014 08:09 AM    ALTSGPT 15 11/27/2024 05:22 AM    ALTSGPT 15 11/22/2014 08:09 AM    TBILIRUBIN 0.3 11/27/2024 05:22 AM    TBILIRUBIN 0.4 11/22/2014 08:09 AM    ALBUMIN 4.2 11/27/2024 05:22 AM    ALBUMIN 4.3 11/22/2014 08:09 AM    TOTPROTEIN 6.5 11/27/2024 05:22 AM    TOTPROTEIN 7.2 11/22/2014 08:09 AM    GLOBULIN 2.3 11/27/2024 05:22 AM    GLOBULIN 2.9 11/22/2014 08:09 AM    AGRATIO 1.5 11/22/2014 08:09 AM       No results found for: \"PROTHROMBTM\", \"INR\"     Lab Results   Component Value Date/Time    WBC 5.4 12/24/2024 04:15 AM    WBC 9.5 06/20/2023 03:40 PM    RBC 4.19 12/24/2024 04:15 AM    RBC 3.80 (L) 06/20/2023 03:40 PM    HEMOGLOBIN 13.6 12/24/2024 04:15 AM    HEMOGLOBIN 12.8 06/20/2023 03:40 PM    HEMATOCRIT 41.3 12/24/2024 04:15 AM    HEMATOCRIT 38.4 06/20/2023 03:40 PM    MCV 99 (H) 12/24/2024 04:15 AM    .1 (H) 06/20/2023 03:40 PM    MCH 32.5 12/24/2024 04:15 AM    MCH 33.7 (H) 06/20/2023 03:40 PM    MCHC 32.9 12/24/2024 04:15 AM    MCHC 33.3 06/20/2023 03:40 PM    MPV 10.3 06/20/2023 03:40 PM    NEUTSPOLYS 39 12/24/2024 04:15 AM    NEUTSPOLYS 65.30 06/20/2023 03:40 PM    LYMPHOCYTES 45 12/24/2024 04:15 AM    LYMPHOCYTES 25.40 06/20/2023 03:40 PM    MONOCYTES 7 12/24/2024 04:15 AM    MONOCYTES 5.20 06/20/2023 03:40 PM    EOSINOPHILS 8 12/24/2024 04:15 AM    EOSINOPHILS 3.40 06/20/2023 03:40 PM    BASOPHILS 1 12/24/2024 04:15 AM    BASOPHILS 0.50 06/20/2023 03:40 PM        No results found for: \"HBA1C\"     Imaging:   I personally reviewed following images, these are my reads  MRI lumbar spine 10/19/2023  Annular tear at L5-S1.  Moderate central canal stenosis at L4-5.  Mild central canal stenosis at L3-4.  At L2-3 and L3-4 and L4-5 and L5-S1 there is mild bilateral neuroforaminal stenosis.  Facet joint synovitis at bilateral L4-5. "  Facet arthropathy at bilateral L5-S1. See formal radiology report for further details.    MRI lumbar spine 10/19/2023  No evidence of sacroiliitis.      IMAGING radiology reads. I reviewed the following radiology reads                      Results for orders placed during the hospital encounter of 10/19/23    MR-LUMBAR SPINE-W/O    Impression  1.  Multilevel degenerative disc disease and facet degeneration. There is moderate central canal narrowing at L4-5, mild central canal narrowing at L3-4 and borderline central canal narrowing at L2-3.    2.  Mild multilevel neural foraminal narrowing as specifically described above.    3.  Mild multilevel degenerative subluxation.        Results for orders placed during the hospital encounter of 10/19/23    MR-LUMBAR SPINE-W/O    Impression  1.  Multilevel degenerative disc disease and facet degeneration. There is moderate central canal narrowing at L4-5, mild central canal narrowing at L3-4 and borderline central canal narrowing at L2-3.    2.  Mild multilevel neural foraminal narrowing as specifically described above.    3.  Mild multilevel degenerative subluxation.               Results for orders placed during the hospital encounter of 10/19/23    MR-PELVIS W/O    Impression  1.  No evidence of marrow edema or arthropathy of the hip articulations.    2.  No evidence of sacroiliitis.    3.  No evidence of muscle or tendon injury.    4.  Osteitis symphysis pubis.                              Results for orders placed in visit on 21    DX-CLAVICLE LEFT                                            Diagnosis  Visit Diagnoses     ICD-10-CM   1. Annular tear of L5-S1 disc  M51.369   2. Chronic bilateral low back pain without sciatica  M54.50    G89.29   3. Lumbar stenosis without neurogenic claudication  M48.061   4. Facet arthropathy  M47.819         ASSESSMENT AND PLAN:  Evelyn Mullins ( 1975) is a female presenting with pain that appears to be facetogenic on  the left side and related to right sided sacroiliac joint dysfunction.  Episodic low back pain that appears possibly secondary to L5-S1 annular tear.    Also with left sided thoracic/UE pain after radiation for left breast cancer     Boogie was seen today for new patient.    Diagnoses and all orders for this visit:    Annular tear of L5-S1 disc  -     MR-LUMBAR SPINE-W/O; Future    Chronic bilateral low back pain without sciatica  -     MR-LUMBAR SPINE-W/O; Future    Lumbar stenosis without neurogenic claudication  -     MR-LUMBAR SPINE-W/O; Future    Facet arthropathy  -     MR-LUMBAR SPINE-W/O; Future    Other orders  -     gabapentin (NEURONTIN) 300 MG Cap; Take 1 Capsule by mouth at bedtime for 7 days, THEN 1 Capsule 2 times a day for 7 days, THEN 1 Capsule 3 times a day for 28 days.          PLAN  Physical Therapy: cont PT    Diagnostic workup: Personally reviewed at today's visit: MRI lumbar spine and pelvis 10/19/23.  Order updated MRI lumbar spine    Medications:   - OK to continue ibuprofen 200mg PRN which significantly helps. Patient would like to minimize medication intake.   -Start trial of gabapentin for postradiation pain at her left torso and upper extremity.  Discussed trial of Cymbalta versus Effexor, she would like to defer these at this time due to side effects that could occur with weaning this medication    Interventions:   -Discussed possible diagnostic lumbar medial branch blocks versus facet joint steroid injections for facetogenic pain versus right sacroiliac joint injection for right sacroiliac joint pain.    Other  - has done acupuncture with significant relief of left piriformis pain but no significant relief of low back pain most recently   - of note patient was on tamoxifen for 1 year. She went off of tamoxifen for 6 weeks starting in Nov, and during this time she had significant improvement in back pain for 2 weeks but unfortunately the pain worsened since. She notes hx of left foot pain  that was thought to be an achilles injury but denies significant pain in all of her joints.  Discussed that I would expect for tamoxifen to increase her pain more diffusely then only in her low back    Follow-up: After MRI    Orders Placed This Encounter    MR-LUMBAR SPINE-W/O    gabapentin (NEURONTIN) 300 MG Cap       Doris Nevarez MD  Interventional Pain and Spine  Physical Medicine and Rehabilitation  [facility]    CC Mary Grissom, A.P.R.N.     Total time: 63 minutes. I spent greater than 50% of the time for patient care and coordination on this date, including face-to-face time with the patient as per assessment and plan above.     The above note documents my personal evaluation of this patient. In addition, I have reviewed and confirmed with the patient and MA the supportive information documented in today's Patient Health Questionnaire and Office Note.     Please note that this dictation was created using voice recognition software. I have made every reasonable attempt to correct obvious errors, but I expect that there are errors of grammar and possibly content that I did not discover before finalizing the note.

## 2025-02-18 DIAGNOSIS — C50.412 MALIGNANT NEOPLASM OF UPPER-OUTER QUADRANT OF LEFT BREAST IN FEMALE, ESTROGEN RECEPTOR POSITIVE (HCC): ICD-10-CM

## 2025-02-18 DIAGNOSIS — Z17.0 MALIGNANT NEOPLASM OF UPPER-OUTER QUADRANT OF LEFT BREAST IN FEMALE, ESTROGEN RECEPTOR POSITIVE (HCC): ICD-10-CM

## 2025-02-18 DIAGNOSIS — Z79.810 USE OF TAMOXIFEN (NOLVADEX): ICD-10-CM

## 2025-02-18 RX ORDER — TAMOXIFEN CITRATE 10 MG/1
5 TABLET ORAL DAILY
Qty: 90 TABLET | Refills: 3 | Status: SHIPPED | OUTPATIENT
Start: 2025-02-18

## 2025-02-18 RX ORDER — TAMOXIFEN CITRATE 10 MG/1
5 TABLET ORAL DAILY
Qty: 30 TABLET | Refills: 3 | Status: SHIPPED | OUTPATIENT
Start: 2025-02-18 | End: 2025-02-18 | Stop reason: SDUPTHER

## 2025-02-27 ENCOUNTER — HOSPITAL ENCOUNTER (OUTPATIENT)
Dept: RADIOLOGY | Facility: MEDICAL CENTER | Age: 50
End: 2025-02-27
Attending: FAMILY MEDICINE

## 2025-02-28 ENCOUNTER — OFFICE VISIT (OUTPATIENT)
Dept: PHYSICAL MEDICINE AND REHAB | Facility: MEDICAL CENTER | Age: 50
End: 2025-02-28
Payer: COMMERCIAL

## 2025-02-28 VITALS
BODY MASS INDEX: 25.55 KG/M2 | HEART RATE: 80 BPM | TEMPERATURE: 98.4 F | DIASTOLIC BLOOD PRESSURE: 73 MMHG | HEIGHT: 63 IN | OXYGEN SATURATION: 99 % | SYSTOLIC BLOOD PRESSURE: 112 MMHG | WEIGHT: 144.18 LBS

## 2025-02-28 DIAGNOSIS — G89.29 CHRONIC BILATERAL LOW BACK PAIN WITHOUT SCIATICA: ICD-10-CM

## 2025-02-28 DIAGNOSIS — M54.50 CHRONIC BILATERAL LOW BACK PAIN WITHOUT SCIATICA: ICD-10-CM

## 2025-02-28 DIAGNOSIS — G89.29 OTHER CHRONIC PAIN: ICD-10-CM

## 2025-02-28 DIAGNOSIS — M47.816 LUMBAR SPONDYLOSIS: ICD-10-CM

## 2025-02-28 DIAGNOSIS — M54.16 LEFT LUMBAR RADICULITIS: ICD-10-CM

## 2025-02-28 DIAGNOSIS — M51.369 ANNULAR TEAR OF LUMBAR DISC: ICD-10-CM

## 2025-02-28 DIAGNOSIS — M48.061 LUMBAR STENOSIS WITHOUT NEUROGENIC CLAUDICATION: ICD-10-CM

## 2025-02-28 DIAGNOSIS — M47.819 FACET ARTHROPATHY: ICD-10-CM

## 2025-02-28 RX ORDER — LEVOTHYROXINE SODIUM 200 UG/1
TABLET ORAL
COMMUNITY
Start: 2025-02-17

## 2025-02-28 ASSESSMENT — PATIENT HEALTH QUESTIONNAIRE - PHQ9
CLINICAL INTERPRETATION OF PHQ2 SCORE: 2
5. POOR APPETITE OR OVEREATING: 1 - SEVERAL DAYS
SUM OF ALL RESPONSES TO PHQ QUESTIONS 1-9: 5

## 2025-02-28 ASSESSMENT — FIBROSIS 4 INDEX: FIB4 SCORE: 0.94

## 2025-02-28 ASSESSMENT — PAIN SCALES - GENERAL: PAINLEVEL_OUTOF10: 5=MODERATE PAIN

## 2025-02-28 NOTE — PROGRESS NOTES
Follow-up patient Note    Interventional Pain and Spine  Physiatry (Physical Medicine and Rehabilitation)     Patient Name: Evelyn Mullins  : 1975  Date of service: 2025    Chief Complaint:   Chief Complaint   Patient presents with    Follow-Up     Imaging Review       HISTORY FROM INITIAL VISIT (2025):     History of Present Illness  Evelyn Mullins is a 50-year-old female who presents for a new patient visit.     She reports experiencing back pain, which began in early  as left-sided discomfort, initially attributed to her piriformis muscle. The pain radiated down her leg, prompting her to seek physical therapy. Despite several months of therapy, her condition did not improve, leading her to consult with Dr. Maciel. A pelvic and lumbar MRI was performed, but no specific diagnosis was made. An injection was suggested, but she declined. Instead, she opted for acupuncture, which significantly alleviated her symptoms after three sessions. However, in , she began experiencing generalized back pain. In 2024, she sustained a left Achilles injury, which was managed by her physical therapist. As her Achilles injury improved, her lower back pain worsened, particularly with rotational movements. Her physical therapist identified instabilities in her lower lumbar facet joints or SI joint, and they have been focusing on core stability without significant progress.       Her movement patterns have been distorted due to her back issues. She reports no radiating pain down her right leg but feels discomfort in her hip. She has not been able to correlate her pain with any specific activities. She finds it difficult to rise from a chair and experiences pain upon waking up in the morning. Prolonged sitting followed by standing exacerbates her pain, but walking provides some relief. She occasionally experiences tingling in her left leg, but it is less frequent and intense than when it first  started in 2023. She reports no weakness, numbness, or tingling in her legs. She has tried acupuncture again this fall, focusing on her back, but it did not help.      She has been off tamoxifen for 6 weeks since 2024, during which she experienced a brief period of relief. She resumed tamoxifen at a lower dose in 2025 but discontinued it a week ago. Her oncologist suggested a possible link between tamoxifen and her symptoms, however she denies significant pain all over with resuming tamoxifen.     She reports constant discomfort at her left torso and upper extremity after surgery and radiation for breast cancer, which is present even at rest.     Her physical therapist at [facility] has been dry needling her lower lumbar and SI joint. She finds that consciously engaging her abs and standing up reduces her pain. She takes ibuprofen 200 mg as needed for pain relief. She has not tried gabapentin or duloxetine for her radiation-induced pain. She is considering Cymbalta as suggested by her primary care physician. She is not interested in taking Effexor due to possible side effects. She has a new MRI scheduled at [facility]. She reports no pregnancy, diabetes, pacemaker or wiring, or claustrophobia.     MEDICATIONS  Current: tamoxifen, ibuprofen    HPI  Today's visit   Evelyn Mullins ( 1975) is a female with Diagnoses of Annular tear of L5-S1 disc, Chronic bilateral low back pain without sciatica, Lumbar stenosis without neurogenic claudication, Facet arthropathy, Left lumbar radiculitis, Lumbar spondylosis, and Other chronic pain were pertinent to this visit.    Improvement in left sided torso and upper extremity neuropathic pain since starting gabapentin.  She is currently taking gabapentin 300 mg twice daily but has noticed a new symptom of intermittent tinnitus since starting it.  She also reports recent memory issues and is unsure if this is due to the gabapentin.  She  is amenable to taking gabapentin only at bedtime.    She reports ongoing pain at her right low back and left low back that can also radiate down her left glute.  The pain interferes with her ability to arch her back backwards.  She continues to work with physical therapy.  She reports that she continues to engage in physical activity such as running, cross-country skiing, and downhill skiing despite the pain.  Typically the pain is more severe at the beginning of her activity and then improves as she continues.  She denies numbness or tingling in her legs at this time.      Pain severity 5/10 currently  Pt denies new numbness, tingling, or weakness.      ROS:   Red Flags ROS:   Fever, Chills, Sweats: Denies  Involuntary Weight Loss: Denies  Bladder Incontinence: Denies  Bowel Incontinence: denies  Saddle Anesthesia: Denies    All other systems reviewed and negative.     PMHx:   Past Medical History:   Diagnosis Date    Bronchitis     Cancer (HCC) 05/30/23    High cholesterol     Technically high but not being treated    Malignant neoplasm of upper-outer quadrant of left breast in female, estrogen receptor positive (HCC) 6/20/2023    Urinary incontinence     Sneezing, jumping       PSHx:   Past Surgical History:   Procedure Laterality Date    PB MASTECTOMY, PARTIAL Left 6/27/2023    Procedure: WIRE LOCALIZED LEFT PARTIAL MASTECTOMY, MASTOPEXY, LEFT SENTINEL LYMPH INJECTION WITH EXCISION OF AXILLARY NODES;  Surgeon: Antionette Au M.D.;  Location: SURGERY SAME DAY [facility];  Service: General    CO SUSPENSION OF BREAST Left 6/27/2023    Procedure: MASTOPEXY;  Surgeon: Antionette Au M.D.;  Location: SURGERY SAME DAY [facility];  Service: General    NODE BIOPSY SENTINEL Left 6/27/2023    Procedure: BIOPSY, LYMPH NODE, SENTINEL;  Surgeon: Antionette Au M.D.;  Location: SURGERY SAME DAY [facility];  Service: General    NO PERTINENT PAST SURGICAL HISTORY         Family Hx:   History reviewed. No pertinent family  history.    Social Hx:  Social History     Socioeconomic History    Marital status: Single     Spouse name: Not on file    Number of children: Not on file    Years of education: Not on file    Highest education level: Not on file   Occupational History    Not on file   Tobacco Use    Smoking status: Never     Passive exposure: Never    Smokeless tobacco: Never   Vaping Use    Vaping status: Never Used   Substance and Sexual Activity    Alcohol use: Not Currently    Drug use: Not Currently     Types: Inhaled     Comment: Tried marijuana a few times    Sexual activity: Not on file   Other Topics Concern    Not on file   Social History Narrative    Not on file     Social Drivers of Health     Financial Resource Strain: Not on file   Food Insecurity: Not on file   Transportation Needs: Not on file   Physical Activity: Not on file   Stress: Not on file   Social Connections: Not on file   Intimate Partner Violence: Not on file   Housing Stability: Not on file       Allergies:  Allergies   Allergen Reactions    Pcn [Penicillins] Anaphylaxis    Shellfish Allergy Swelling       Medications: reviewed on epic.   Outpatient Medications Marked as Taking for the 2/28/25 encounter (Office Visit) with Doris Nevarez M.D.   Medication Sig Dispense Refill    levothyroxine (SYNTHROID) 200 MCG Tab       tamoxifen (NOLVADEX) 10 MG Tab Take 0.5 Tablets by mouth every day. 90 Tablet 3    gabapentin (NEURONTIN) 300 MG Cap Take 1 Capsule by mouth at bedtime for 7 days, THEN 1 Capsule 2 times a day for 7 days, THEN 1 Capsule 3 times a day for 28 days. 105 Capsule 0    Omega-3 Fatty Acids (FISH OIL) 1000 MG Cap capsule Take 1,000 mg by mouth 3 times a day with meals.      EPSOLAY 5 % cream       Melatonin 1 MG/4ML Liquid       Probiotic Product (PROBIOTIC + TURMERIC EXTRACT) 400 MG Cap       glutamine 500 mg/mL oral suspension       fluticasone (FLONASE) 50 MCG/ACT nasal spray Administer 1 Spray into affected nostril(S) every day.       "VITAMIN D PO Take 2,000 Int'l Units by mouth every day.      MAGNESIUM PO Take 400 mg by mouth every day.      multivitamin Tab Take 1 Tablet by mouth every day.      Cetirizine HCl (ZYRTEC ALLERGY PO) Take 10 mg by mouth every day.          Current Outpatient Medications on File Prior to Visit   Medication Sig Dispense Refill    levothyroxine (SYNTHROID) 200 MCG Tab       tamoxifen (NOLVADEX) 10 MG Tab Take 0.5 Tablets by mouth every day. 90 Tablet 3    gabapentin (NEURONTIN) 300 MG Cap Take 1 Capsule by mouth at bedtime for 7 days, THEN 1 Capsule 2 times a day for 7 days, THEN 1 Capsule 3 times a day for 28 days. 105 Capsule 0    Omega-3 Fatty Acids (FISH OIL) 1000 MG Cap capsule Take 1,000 mg by mouth 3 times a day with meals.      EPSOLAY 5 % cream       Melatonin 1 MG/4ML Liquid       Probiotic Product (PROBIOTIC + TURMERIC EXTRACT) 400 MG Cap       glutamine 500 mg/mL oral suspension       fluticasone (FLONASE) 50 MCG/ACT nasal spray Administer 1 Spray into affected nostril(S) every day.      VITAMIN D PO Take 2,000 Int'l Units by mouth every day.      MAGNESIUM PO Take 400 mg by mouth every day.      multivitamin Tab Take 1 Tablet by mouth every day.      Cetirizine HCl (ZYRTEC ALLERGY PO) Take 10 mg by mouth every day.      levothyroxine (SYNTHROID) 175 MCG Tab       levothyroxine (SYNTHROID) 150 MCG Tab Take 175 mcg by mouth every morning on an empty stomach. 5 days per week, 150 mg 2 days per week (Patient not taking: Reported on 2/13/2025)       No current facility-administered medications on file prior to visit.         EXAMINATION     Physical Exam:   /73 (BP Location: Right arm, Patient Position: Sitting, BP Cuff Size: Adult)   Pulse 80   Temp 36.9 °C (98.4 °F) (Temporal)   Ht 1.6 m (5' 3\")   Wt 65.4 kg (144 lb 2.9 oz)   SpO2 99%     Constitutional:   Body Habitus: Body mass index is 25.54 kg/m².  Cooperation: Fully cooperates with exam  Appearance: Well-groomed, well-nourished.    Eyes: No " scleral icterus to suggest severe liver disease, no proptosis to suggest severe hyperthyroidism    ENT -no obvious auditory deficits, no noticeable facial droop     Skin -no rashes or lesions noted     Respiratory-  breathing comfortably on room air, no audible wheezing    Cardiovascular-distal extremities warm and well perfused.  No lower extremity edema is noted.     Gastrointestinal - no obvious abdominal masses, non-distended    Psychiatric- alert and oriented ×3. Normal affect.     Gait - normal gait, no use of ambulatory device, nonantalgic.     Musculoskeletal and Neuro -      Thoracic/Lumbar Spine/Sacral Spine/Hips   Inspection: No evidence of atrophy in bilateral lower extremities throughout      There is limited active range of motion with lumbar extension, possibly secondary to pain     Facet loading maneuver positive bilaterally     Palpation:   Tenderness to palpation over the paraspinal muscles bilaterally, lumbar facets bilaterally     Lumbar spine /hip provocative exam maneuvers  Straight leg raise negative bilaterally  FADIR test negative bilaterally     SI joint tests  NEVA test positive bilaterally        Thigh thrust test negative bilaterally  SI joint compression negative bilaterally  SI joint distraction negative bilaterally  Sacral thrust test negative bilaterally  Gaenslens maneuver negative bilaterally        Key points for the international standards for neurological classification of spinal cord injury (ISNCSCI) to light touch.   Dermatome R L   L2 2 2   L3 2 2   L4 2 2   L5 2 2   S1 2 2   S2 2 2         Motor Exam Lower Extremities  ? Myotome R L   Hip flexion L2 5 5   Knee extension L3 5 5   Ankle dorsiflexion L4 5 5   Toe extension L5 5 5   Ankle plantarflexion S1 5 5       previous exam  Reflexes  ?   R L   Patella   2+ 2+   Achilles    2+ 2+      Clonus of the ankle negative bilaterally          MEDICAL DECISION MAKING    Medical records review: see under HPI section.  "    DATA    Labs: No new labs available for review since last visit.   Lab Results   Component Value Date/Time    SODIUM 141 11/27/2024 05:22 AM    SODIUM 136 06/20/2023 03:40 PM    POTASSIUM 4.4 11/27/2024 05:22 AM    POTASSIUM 3.9 06/20/2023 03:40 PM    CHLORIDE 109 (H) 11/27/2024 05:22 AM    CHLORIDE 102 06/20/2023 03:40 PM    CO2 20 11/27/2024 05:22 AM    CO2 25 06/20/2023 03:40 PM    ANION 9.0 06/20/2023 03:40 PM    GLUCOSE 86 11/27/2024 05:22 AM    GLUCOSE 83 06/20/2023 03:40 PM    BUN 22 11/27/2024 05:22 AM    BUN 18 06/20/2023 03:40 PM    CREATININE 0.99 11/27/2024 05:22 AM    CREATININE 0.96 06/20/2023 03:40 PM    CALCIUM 9.4 11/27/2024 05:22 AM    CALCIUM 9.7 06/20/2023 03:40 PM    ASTSGOT 22 11/27/2024 05:22 AM    ASTSGOT 20 11/22/2014 08:09 AM    ALTSGPT 15 11/27/2024 05:22 AM    ALTSGPT 15 11/22/2014 08:09 AM    TBILIRUBIN 0.3 11/27/2024 05:22 AM    TBILIRUBIN 0.4 11/22/2014 08:09 AM    ALBUMIN 4.2 11/27/2024 05:22 AM    ALBUMIN 4.3 11/22/2014 08:09 AM    TOTPROTEIN 6.5 11/27/2024 05:22 AM    TOTPROTEIN 7.2 11/22/2014 08:09 AM    GLOBULIN 2.3 11/27/2024 05:22 AM    GLOBULIN 2.9 11/22/2014 08:09 AM    AGRATIO 1.5 11/22/2014 08:09 AM       No results found for: \"PROTHROMBTM\", \"INR\"     Lab Results   Component Value Date/Time    WBC 5.4 12/24/2024 04:15 AM    WBC 9.5 06/20/2023 03:40 PM    RBC 4.19 12/24/2024 04:15 AM    RBC 3.80 (L) 06/20/2023 03:40 PM    HEMOGLOBIN 13.6 12/24/2024 04:15 AM    HEMOGLOBIN 12.8 06/20/2023 03:40 PM    HEMATOCRIT 41.3 12/24/2024 04:15 AM    HEMATOCRIT 38.4 06/20/2023 03:40 PM    MCV 99 (H) 12/24/2024 04:15 AM    .1 (H) 06/20/2023 03:40 PM    MCH 32.5 12/24/2024 04:15 AM    MCH 33.7 (H) 06/20/2023 03:40 PM    MCHC 32.9 12/24/2024 04:15 AM    MCHC 33.3 06/20/2023 03:40 PM    MPV 10.3 06/20/2023 03:40 PM    NEUTSPOLYS 39 12/24/2024 04:15 AM    NEUTSPOLYS 65.30 06/20/2023 03:40 PM    LYMPHOCYTES 45 12/24/2024 04:15 AM    LYMPHOCYTES 25.40 06/20/2023 03:40 PM    MONOCYTES 7 " "12/24/2024 04:15 AM    MONOCYTES 5.20 06/20/2023 03:40 PM    EOSINOPHILS 8 12/24/2024 04:15 AM    EOSINOPHILS 3.40 06/20/2023 03:40 PM    BASOPHILS 1 12/24/2024 04:15 AM    BASOPHILS 0.50 06/20/2023 03:40 PM        No results found for: \"HBA1C\"     Imaging:   I personally reviewed following images, these are my reads  MRI lumbar spine 10/19/2023  Annular tear at L5-S1.  Moderate central canal stenosis at L4-5.  Mild central canal stenosis at L3-4.  At L2-3 and L3-4 and L4-5 and L5-S1 there is mild bilateral neuroforaminal stenosis.  Facet joint synovitis at bilateral L4-5.  Facet arthropathy at bilateral L5-S1. See formal radiology report for further details.     MRI lumbar spine 10/19/2023  No evidence of sacroiliitis.    MRI lumbar spine 2/26/2025  At L4-5 there is moderate central canal stenosis and mild bilateral neuroforaminal stenosis worse on the right.  At L5-S1 there is mild bilateral neuroforaminal stenosis.  Annular tear at L5-S1.  Facet arthropathy at multiple levels most severe at bilateral L4-5. See formal radiology report for further details.      IMAGING radiology reads. I reviewed the following radiology reads                      Results for orders placed during the hospital encounter of 10/19/23    MR-LUMBAR SPINE-W/O    Impression  1.  Multilevel degenerative disc disease and facet degeneration. There is moderate central canal narrowing at L4-5, mild central canal narrowing at L3-4 and borderline central canal narrowing at L2-3.    2.  Mild multilevel neural foraminal narrowing as specifically described above.    3.  Mild multilevel degenerative subluxation.        Results for orders placed during the hospital encounter of 10/19/23    MR-LUMBAR SPINE-W/O    Impression  1.  Multilevel degenerative disc disease and facet degeneration. There is moderate central canal narrowing at L4-5, mild central canal narrowing at L3-4 and borderline central canal narrowing at L2-3.    2.  Mild multilevel neural " foraminal narrowing as specifically described above.    3.  Mild multilevel degenerative subluxation.               Results for orders placed during the hospital encounter of 10/19/23    MR-PELVIS W/O    Impression  1.  No evidence of marrow edema or arthropathy of the hip articulations.    2.  No evidence of sacroiliitis.    3.  No evidence of muscle or tendon injury.    4.  Osteitis symphysis pubis.                              Results for orders placed in visit on 21    DX-CLAVICLE LEFT                                            Diagnosis  Visit Diagnoses     ICD-10-CM   1. Annular tear of L5-S1 disc  M51.369   2. Chronic bilateral low back pain without sciatica  M54.50    G89.29   3. Lumbar stenosis without neurogenic claudication  M48.061   4. Facet arthropathy  M47.819   5. Left lumbar radiculitis  M54.16   6. Lumbar spondylosis  M47.816   7. Other chronic pain  G89.29         ASSESSMENT AND PLAN:  Evelyn Mullins (: 1975) is a female with bilateral low back pain that appears to be facetogenic bilaterally, reproduced with positive facet loading maneuver and with facet arthropathy seen on MRI lumbar spine corresponding to her areas of pain.  Also with episodic low back pain that appears possibly secondary to L5-S1 annular tear.     Also with left sided thoracic/UE pain after radiation for left breast cancer     Boogie was seen today for follow-up.    Diagnoses and all orders for this visit:    Annular tear of L5-S1 disc    Chronic bilateral low back pain without sciatica    Lumbar stenosis without neurogenic claudication    Facet arthropathy    Left lumbar radiculitis    Lumbar spondylosis  -     Referral to Pain Clinic    Other chronic pain          PLAN  Physical Therapy: cont PT. At this time the patient continues to have severe low back pain despite completing over 3 months of PT     Diagnostic workup: Personally reviewed at today's visit: MRI lumbar spine 2025     Medications:   - OK  to continue ibuprofen 200mg PRN which significantly helps. Patient would like to minimize medication intake.   -continue gabapentin which is improving postradiation pain at her left torso and upper extremity.  Discussed that to minimize cognitive side effects during the day, I would recommend consolidating her gabapentin to nightly dosing.  Currently takes 600 mg nightly, then uptitrate to 900 mg nightly if tolerated.    - I have discussed trial of Cymbalta versus Effexor, she would like to defer these at this time due to side effects that could occur with weaning this medication     Interventions:   - discussed diagnostic lumbar medial branch blocks targeting Bilateral L4-L5 and L5-S1 facet joints. The risks, benefits, and alternatives to this procedure were discussed and the patient wishes to proceed with the procedure. Risks include but are not limited to damage to surrounding structures, infection, bleeding, worsening of pain which can be permanent, and weakness which can be permanent. Benefits include pain relief and improved function. Alternatives include not doing the procedure.    - discussed possible left sided epidural steroid injection if radicular pain worsens.  Currently it is tolerable.     Other  - has done acupuncture with significant relief of left piriformis pain but no significant relief of low back pain most recently   - of note patient was on tamoxifen for 1 year. She went off of tamoxifen for 6 weeks starting in Nov, and during this time she had significant improvement in back pain for 2 weeks but unfortunately the pain worsened since. She notes hx of left foot pain that was thought to be an achilles injury but denies significant pain in all of her joints.  I have discussed that I would expect for tamoxifen to increase her pain more diffusely then only in her low back.  I shared this information with her oncology team    Follow-up: 3-7 days after [facility]    Orders Placed This Encounter    Referral  to Pain Clinic    levothyroxine (SYNTHROID) 200 MCG Tab       Doris Nevarez MD  Interventional Pain and Spine  Physical Medicine and Rehabilitation  [facility]      The above note documents my personal evaluation of this patient. In addition, I have reviewed and confirmed with the patient and MA the supportive information documented in today's Patient Health Questionnaire and Office Note.     Please note that this dictation was created using voice recognition software. I have made every reasonable attempt to correct obvious errors, but I expect that there are errors of grammar and possibly content that I did not discover before finalizing the note.

## 2025-02-28 NOTE — H&P (VIEW-ONLY)
Follow-up patient Note    Interventional Pain and Spine  Physiatry (Physical Medicine and Rehabilitation)     Patient Name: Evelyn Mullins  : 1975  Date of service: 2025    Chief Complaint:   Chief Complaint   Patient presents with    Follow-Up     Imaging Review       HISTORY FROM INITIAL VISIT (2025):     History of Present Illness  Evelyn Mullins is a 50-year-old female who presents for a new patient visit.     She reports experiencing back pain, which began in early  as left-sided discomfort, initially attributed to her piriformis muscle. The pain radiated down her leg, prompting her to seek physical therapy. Despite several months of therapy, her condition did not improve, leading her to consult with Dr. Maciel. A pelvic and lumbar MRI was performed, but no specific diagnosis was made. An injection was suggested, but she declined. Instead, she opted for acupuncture, which significantly alleviated her symptoms after three sessions. However, in , she began experiencing generalized back pain. In 2024, she sustained a left Achilles injury, which was managed by her physical therapist. As her Achilles injury improved, her lower back pain worsened, particularly with rotational movements. Her physical therapist identified instabilities in her lower lumbar facet joints or SI joint, and they have been focusing on core stability without significant progress.       Her movement patterns have been distorted due to her back issues. She reports no radiating pain down her right leg but feels discomfort in her hip. She has not been able to correlate her pain with any specific activities. She finds it difficult to rise from a chair and experiences pain upon waking up in the morning. Prolonged sitting followed by standing exacerbates her pain, but walking provides some relief. She occasionally experiences tingling in her left leg, but it is less frequent and intense than when it first  started in 2023. She reports no weakness, numbness, or tingling in her legs. She has tried acupuncture again this fall, focusing on her back, but it did not help.      She has been off tamoxifen for 6 weeks since 2024, during which she experienced a brief period of relief. She resumed tamoxifen at a lower dose in 2025 but discontinued it a week ago. Her oncologist suggested a possible link between tamoxifen and her symptoms, however she denies significant pain all over with resuming tamoxifen.     She reports constant discomfort at her left torso and upper extremity after surgery and radiation for breast cancer, which is present even at rest.     Her physical therapist at [facility] has been dry needling her lower lumbar and SI joint. She finds that consciously engaging her abs and standing up reduces her pain. She takes ibuprofen 200 mg as needed for pain relief. She has not tried gabapentin or duloxetine for her radiation-induced pain. She is considering Cymbalta as suggested by her primary care physician. She is not interested in taking Effexor due to possible side effects. She has a new MRI scheduled at [facility]. She reports no pregnancy, diabetes, pacemaker or wiring, or claustrophobia.     MEDICATIONS  Current: tamoxifen, ibuprofen    HPI  Today's visit   Evelyn Mullins ( 1975) is a female with Diagnoses of Annular tear of L5-S1 disc, Chronic bilateral low back pain without sciatica, Lumbar stenosis without neurogenic claudication, Facet arthropathy, Left lumbar radiculitis, Lumbar spondylosis, and Other chronic pain were pertinent to this visit.    Improvement in left sided torso and upper extremity neuropathic pain since starting gabapentin.  She is currently taking gabapentin 300 mg twice daily but has noticed a new symptom of intermittent tinnitus since starting it.  She also reports recent memory issues and is unsure if this is due to the gabapentin.  She  is amenable to taking gabapentin only at bedtime.    She reports ongoing pain at her right low back and left low back that can also radiate down her left glute.  The pain interferes with her ability to arch her back backwards.  She continues to work with physical therapy.  She reports that she continues to engage in physical activity such as running, cross-country skiing, and downhill skiing despite the pain.  Typically the pain is more severe at the beginning of her activity and then improves as she continues.  She denies numbness or tingling in her legs at this time.      Pain severity 5/10 currently  Pt denies new numbness, tingling, or weakness.      ROS:   Red Flags ROS:   Fever, Chills, Sweats: Denies  Involuntary Weight Loss: Denies  Bladder Incontinence: Denies  Bowel Incontinence: denies  Saddle Anesthesia: Denies    All other systems reviewed and negative.     PMHx:   Past Medical History:   Diagnosis Date    Bronchitis     Cancer (HCC) 05/30/23    High cholesterol     Technically high but not being treated    Malignant neoplasm of upper-outer quadrant of left breast in female, estrogen receptor positive (HCC) 6/20/2023    Urinary incontinence     Sneezing, jumping       PSHx:   Past Surgical History:   Procedure Laterality Date    PB MASTECTOMY, PARTIAL Left 6/27/2023    Procedure: WIRE LOCALIZED LEFT PARTIAL MASTECTOMY, MASTOPEXY, LEFT SENTINEL LYMPH INJECTION WITH EXCISION OF AXILLARY NODES;  Surgeon: Antionette Au M.D.;  Location: SURGERY SAME DAY [facility];  Service: General    WA SUSPENSION OF BREAST Left 6/27/2023    Procedure: MASTOPEXY;  Surgeon: Antionette Au M.D.;  Location: SURGERY SAME DAY [facility];  Service: General    NODE BIOPSY SENTINEL Left 6/27/2023    Procedure: BIOPSY, LYMPH NODE, SENTINEL;  Surgeon: Antionette Au M.D.;  Location: SURGERY SAME DAY [facility];  Service: General    NO PERTINENT PAST SURGICAL HISTORY         Family Hx:   History reviewed. No pertinent family  history.    Social Hx:  Social History     Socioeconomic History    Marital status: Single     Spouse name: Not on file    Number of children: Not on file    Years of education: Not on file    Highest education level: Not on file   Occupational History    Not on file   Tobacco Use    Smoking status: Never     Passive exposure: Never    Smokeless tobacco: Never   Vaping Use    Vaping status: Never Used   Substance and Sexual Activity    Alcohol use: Not Currently    Drug use: Not Currently     Types: Inhaled     Comment: Tried marijuana a few times    Sexual activity: Not on file   Other Topics Concern    Not on file   Social History Narrative    Not on file     Social Drivers of Health     Financial Resource Strain: Not on file   Food Insecurity: Not on file   Transportation Needs: Not on file   Physical Activity: Not on file   Stress: Not on file   Social Connections: Not on file   Intimate Partner Violence: Not on file   Housing Stability: Not on file       Allergies:  Allergies   Allergen Reactions    Pcn [Penicillins] Anaphylaxis    Shellfish Allergy Swelling       Medications: reviewed on epic.   Outpatient Medications Marked as Taking for the 2/28/25 encounter (Office Visit) with Doris Nevarez M.D.   Medication Sig Dispense Refill    levothyroxine (SYNTHROID) 200 MCG Tab       tamoxifen (NOLVADEX) 10 MG Tab Take 0.5 Tablets by mouth every day. 90 Tablet 3    gabapentin (NEURONTIN) 300 MG Cap Take 1 Capsule by mouth at bedtime for 7 days, THEN 1 Capsule 2 times a day for 7 days, THEN 1 Capsule 3 times a day for 28 days. 105 Capsule 0    Omega-3 Fatty Acids (FISH OIL) 1000 MG Cap capsule Take 1,000 mg by mouth 3 times a day with meals.      EPSOLAY 5 % cream       Melatonin 1 MG/4ML Liquid       Probiotic Product (PROBIOTIC + TURMERIC EXTRACT) 400 MG Cap       glutamine 500 mg/mL oral suspension       fluticasone (FLONASE) 50 MCG/ACT nasal spray Administer 1 Spray into affected nostril(S) every day.       "VITAMIN D PO Take 2,000 Int'l Units by mouth every day.      MAGNESIUM PO Take 400 mg by mouth every day.      multivitamin Tab Take 1 Tablet by mouth every day.      Cetirizine HCl (ZYRTEC ALLERGY PO) Take 10 mg by mouth every day.          Current Outpatient Medications on File Prior to Visit   Medication Sig Dispense Refill    levothyroxine (SYNTHROID) 200 MCG Tab       tamoxifen (NOLVADEX) 10 MG Tab Take 0.5 Tablets by mouth every day. 90 Tablet 3    gabapentin (NEURONTIN) 300 MG Cap Take 1 Capsule by mouth at bedtime for 7 days, THEN 1 Capsule 2 times a day for 7 days, THEN 1 Capsule 3 times a day for 28 days. 105 Capsule 0    Omega-3 Fatty Acids (FISH OIL) 1000 MG Cap capsule Take 1,000 mg by mouth 3 times a day with meals.      EPSOLAY 5 % cream       Melatonin 1 MG/4ML Liquid       Probiotic Product (PROBIOTIC + TURMERIC EXTRACT) 400 MG Cap       glutamine 500 mg/mL oral suspension       fluticasone (FLONASE) 50 MCG/ACT nasal spray Administer 1 Spray into affected nostril(S) every day.      VITAMIN D PO Take 2,000 Int'l Units by mouth every day.      MAGNESIUM PO Take 400 mg by mouth every day.      multivitamin Tab Take 1 Tablet by mouth every day.      Cetirizine HCl (ZYRTEC ALLERGY PO) Take 10 mg by mouth every day.      levothyroxine (SYNTHROID) 175 MCG Tab       levothyroxine (SYNTHROID) 150 MCG Tab Take 175 mcg by mouth every morning on an empty stomach. 5 days per week, 150 mg 2 days per week (Patient not taking: Reported on 2/13/2025)       No current facility-administered medications on file prior to visit.         EXAMINATION     Physical Exam:   /73 (BP Location: Right arm, Patient Position: Sitting, BP Cuff Size: Adult)   Pulse 80   Temp 36.9 °C (98.4 °F) (Temporal)   Ht 1.6 m (5' 3\")   Wt 65.4 kg (144 lb 2.9 oz)   SpO2 99%     Constitutional:   Body Habitus: Body mass index is 25.54 kg/m².  Cooperation: Fully cooperates with exam  Appearance: Well-groomed, well-nourished.    Eyes: No " scleral icterus to suggest severe liver disease, no proptosis to suggest severe hyperthyroidism    ENT -no obvious auditory deficits, no noticeable facial droop     Skin -no rashes or lesions noted     Respiratory-  breathing comfortably on room air, no audible wheezing    Cardiovascular-distal extremities warm and well perfused.  No lower extremity edema is noted.     Gastrointestinal - no obvious abdominal masses, non-distended    Psychiatric- alert and oriented ×3. Normal affect.     Gait - normal gait, no use of ambulatory device, nonantalgic.     Musculoskeletal and Neuro -      Thoracic/Lumbar Spine/Sacral Spine/Hips   Inspection: No evidence of atrophy in bilateral lower extremities throughout      There is limited active range of motion with lumbar extension, possibly secondary to pain     Facet loading maneuver positive bilaterally     Palpation:   Tenderness to palpation over the paraspinal muscles bilaterally, lumbar facets bilaterally     Lumbar spine /hip provocative exam maneuvers  Straight leg raise negative bilaterally  FADIR test negative bilaterally     SI joint tests  NEVA test positive bilaterally        Thigh thrust test negative bilaterally  SI joint compression negative bilaterally  SI joint distraction negative bilaterally  Sacral thrust test negative bilaterally  Gaenslens maneuver negative bilaterally        Key points for the international standards for neurological classification of spinal cord injury (ISNCSCI) to light touch.   Dermatome R L   L2 2 2   L3 2 2   L4 2 2   L5 2 2   S1 2 2   S2 2 2         Motor Exam Lower Extremities  ? Myotome R L   Hip flexion L2 5 5   Knee extension L3 5 5   Ankle dorsiflexion L4 5 5   Toe extension L5 5 5   Ankle plantarflexion S1 5 5       previous exam  Reflexes  ?   R L   Patella   2+ 2+   Achilles    2+ 2+      Clonus of the ankle negative bilaterally          MEDICAL DECISION MAKING    Medical records review: see under HPI section.  "    DATA    Labs: No new labs available for review since last visit.   Lab Results   Component Value Date/Time    SODIUM 141 11/27/2024 05:22 AM    SODIUM 136 06/20/2023 03:40 PM    POTASSIUM 4.4 11/27/2024 05:22 AM    POTASSIUM 3.9 06/20/2023 03:40 PM    CHLORIDE 109 (H) 11/27/2024 05:22 AM    CHLORIDE 102 06/20/2023 03:40 PM    CO2 20 11/27/2024 05:22 AM    CO2 25 06/20/2023 03:40 PM    ANION 9.0 06/20/2023 03:40 PM    GLUCOSE 86 11/27/2024 05:22 AM    GLUCOSE 83 06/20/2023 03:40 PM    BUN 22 11/27/2024 05:22 AM    BUN 18 06/20/2023 03:40 PM    CREATININE 0.99 11/27/2024 05:22 AM    CREATININE 0.96 06/20/2023 03:40 PM    CALCIUM 9.4 11/27/2024 05:22 AM    CALCIUM 9.7 06/20/2023 03:40 PM    ASTSGOT 22 11/27/2024 05:22 AM    ASTSGOT 20 11/22/2014 08:09 AM    ALTSGPT 15 11/27/2024 05:22 AM    ALTSGPT 15 11/22/2014 08:09 AM    TBILIRUBIN 0.3 11/27/2024 05:22 AM    TBILIRUBIN 0.4 11/22/2014 08:09 AM    ALBUMIN 4.2 11/27/2024 05:22 AM    ALBUMIN 4.3 11/22/2014 08:09 AM    TOTPROTEIN 6.5 11/27/2024 05:22 AM    TOTPROTEIN 7.2 11/22/2014 08:09 AM    GLOBULIN 2.3 11/27/2024 05:22 AM    GLOBULIN 2.9 11/22/2014 08:09 AM    AGRATIO 1.5 11/22/2014 08:09 AM       No results found for: \"PROTHROMBTM\", \"INR\"     Lab Results   Component Value Date/Time    WBC 5.4 12/24/2024 04:15 AM    WBC 9.5 06/20/2023 03:40 PM    RBC 4.19 12/24/2024 04:15 AM    RBC 3.80 (L) 06/20/2023 03:40 PM    HEMOGLOBIN 13.6 12/24/2024 04:15 AM    HEMOGLOBIN 12.8 06/20/2023 03:40 PM    HEMATOCRIT 41.3 12/24/2024 04:15 AM    HEMATOCRIT 38.4 06/20/2023 03:40 PM    MCV 99 (H) 12/24/2024 04:15 AM    .1 (H) 06/20/2023 03:40 PM    MCH 32.5 12/24/2024 04:15 AM    MCH 33.7 (H) 06/20/2023 03:40 PM    MCHC 32.9 12/24/2024 04:15 AM    MCHC 33.3 06/20/2023 03:40 PM    MPV 10.3 06/20/2023 03:40 PM    NEUTSPOLYS 39 12/24/2024 04:15 AM    NEUTSPOLYS 65.30 06/20/2023 03:40 PM    LYMPHOCYTES 45 12/24/2024 04:15 AM    LYMPHOCYTES 25.40 06/20/2023 03:40 PM    MONOCYTES 7 " "12/24/2024 04:15 AM    MONOCYTES 5.20 06/20/2023 03:40 PM    EOSINOPHILS 8 12/24/2024 04:15 AM    EOSINOPHILS 3.40 06/20/2023 03:40 PM    BASOPHILS 1 12/24/2024 04:15 AM    BASOPHILS 0.50 06/20/2023 03:40 PM        No results found for: \"HBA1C\"     Imaging:   I personally reviewed following images, these are my reads  MRI lumbar spine 10/19/2023  Annular tear at L5-S1.  Moderate central canal stenosis at L4-5.  Mild central canal stenosis at L3-4.  At L2-3 and L3-4 and L4-5 and L5-S1 there is mild bilateral neuroforaminal stenosis.  Facet joint synovitis at bilateral L4-5.  Facet arthropathy at bilateral L5-S1. See formal radiology report for further details.     MRI lumbar spine 10/19/2023  No evidence of sacroiliitis.    MRI lumbar spine 2/26/2025  At L4-5 there is moderate central canal stenosis and mild bilateral neuroforaminal stenosis worse on the right.  At L5-S1 there is mild bilateral neuroforaminal stenosis.  Annular tear at L5-S1.  Facet arthropathy at multiple levels most severe at bilateral L4-5. See formal radiology report for further details.      IMAGING radiology reads. I reviewed the following radiology reads                      Results for orders placed during the hospital encounter of 10/19/23    MR-LUMBAR SPINE-W/O    Impression  1.  Multilevel degenerative disc disease and facet degeneration. There is moderate central canal narrowing at L4-5, mild central canal narrowing at L3-4 and borderline central canal narrowing at L2-3.    2.  Mild multilevel neural foraminal narrowing as specifically described above.    3.  Mild multilevel degenerative subluxation.        Results for orders placed during the hospital encounter of 10/19/23    MR-LUMBAR SPINE-W/O    Impression  1.  Multilevel degenerative disc disease and facet degeneration. There is moderate central canal narrowing at L4-5, mild central canal narrowing at L3-4 and borderline central canal narrowing at L2-3.    2.  Mild multilevel neural " foraminal narrowing as specifically described above.    3.  Mild multilevel degenerative subluxation.               Results for orders placed during the hospital encounter of 10/19/23    MR-PELVIS W/O    Impression  1.  No evidence of marrow edema or arthropathy of the hip articulations.    2.  No evidence of sacroiliitis.    3.  No evidence of muscle or tendon injury.    4.  Osteitis symphysis pubis.                              Results for orders placed in visit on 21    DX-CLAVICLE LEFT                                            Diagnosis  Visit Diagnoses     ICD-10-CM   1. Annular tear of L5-S1 disc  M51.369   2. Chronic bilateral low back pain without sciatica  M54.50    G89.29   3. Lumbar stenosis without neurogenic claudication  M48.061   4. Facet arthropathy  M47.819   5. Left lumbar radiculitis  M54.16   6. Lumbar spondylosis  M47.816   7. Other chronic pain  G89.29         ASSESSMENT AND PLAN:  Evelyn Mullins (: 1975) is a female with bilateral low back pain that appears to be facetogenic bilaterally, reproduced with positive facet loading maneuver and with facet arthropathy seen on MRI lumbar spine corresponding to her areas of pain.  Also with episodic low back pain that appears possibly secondary to L5-S1 annular tear.     Also with left sided thoracic/UE pain after radiation for left breast cancer     Boogie was seen today for follow-up.    Diagnoses and all orders for this visit:    Annular tear of L5-S1 disc    Chronic bilateral low back pain without sciatica    Lumbar stenosis without neurogenic claudication    Facet arthropathy    Left lumbar radiculitis    Lumbar spondylosis  -     Referral to Pain Clinic    Other chronic pain          PLAN  Physical Therapy: cont PT. At this time the patient continues to have severe low back pain despite completing over 3 months of PT     Diagnostic workup: Personally reviewed at today's visit: MRI lumbar spine 2025     Medications:   - OK  to continue ibuprofen 200mg PRN which significantly helps. Patient would like to minimize medication intake.   -continue gabapentin which is improving postradiation pain at her left torso and upper extremity.  Discussed that to minimize cognitive side effects during the day, I would recommend consolidating her gabapentin to nightly dosing.  Currently takes 600 mg nightly, then uptitrate to 900 mg nightly if tolerated.    - I have discussed trial of Cymbalta versus Effexor, she would like to defer these at this time due to side effects that could occur with weaning this medication     Interventions:   - discussed diagnostic lumbar medial branch blocks targeting Bilateral L4-L5 and L5-S1 facet joints. The risks, benefits, and alternatives to this procedure were discussed and the patient wishes to proceed with the procedure. Risks include but are not limited to damage to surrounding structures, infection, bleeding, worsening of pain which can be permanent, and weakness which can be permanent. Benefits include pain relief and improved function. Alternatives include not doing the procedure.    - discussed possible left sided epidural steroid injection if radicular pain worsens.  Currently it is tolerable.     Other  - has done acupuncture with significant relief of left piriformis pain but no significant relief of low back pain most recently   - of note patient was on tamoxifen for 1 year. She went off of tamoxifen for 6 weeks starting in Nov, and during this time she had significant improvement in back pain for 2 weeks but unfortunately the pain worsened since. She notes hx of left foot pain that was thought to be an achilles injury but denies significant pain in all of her joints.  I have discussed that I would expect for tamoxifen to increase her pain more diffusely then only in her low back.  I shared this information with her oncology team    Follow-up: 3-7 days after LMBB    Orders Placed This Encounter    Referral  to Pain Clinic    levothyroxine (SYNTHROID) 200 MCG Tab       Doris Nevarez MD  Interventional Pain and Spine  Physical Medicine and Rehabilitation  [facility]      The above note documents my personal evaluation of this patient. In addition, I have reviewed and confirmed with the patient and MA the supportive information documented in today's Patient Health Questionnaire and Office Note.     Please note that this dictation was created using voice recognition software. I have made every reasonable attempt to correct obvious errors, but I expect that there are errors of grammar and possibly content that I did not discover before finalizing the note.

## 2025-03-03 NOTE — TELEPHONE ENCOUNTER
Received request via: Pharmacy    Was the patient seen in the last year in this department? Yes    Does the patient have an active prescription (recently filled or refills available) for medication(s) requested?  yes    Pharmacy Name: [facility]    Does the patient have half-way Plus and need 100-day supply? (This applies to ALL medications) Patient does not have SCP

## 2025-03-03 NOTE — Clinical Note
REFERRAL APPROVAL NOTICE         Sent on March 3, 2025                   Boogie Mullins  [address] [place] [address] [place] [address]                   Dear MsNguyen Susanna,    After a careful review of the medical information and benefit coverage, [facility] has processed your referral. See below for additional details.    If applicable, you must be actively enrolled with your insurance for coverage of the authorized service. If you have any questions regarding your coverage, please contact your insurance directly.    REFERRAL INFORMATION   Referral #:  [id number]  Referred-To Department    Referred-By Provider:  Physical Medicine and Rehab    Doris Nevarez M.D.   Pain Management       [address]  [address]  [place] [address]  [phone] [address] [place] [address]  [place] [id number] [phone]    Referral Start Date:  02/28/2025  Referral End Date:   06/03/2025             SCHEDULING  If you do not already have an appointment, please call [phone] to make an appointment.     MORE INFORMATION  If you do not already have a [employer] account, sign up at: [email or web address]  You can access your medical information, make appointments, see lab results, billing information, and more.  If you have questions regarding this referral, please contact  the [facility] Referrals department at:             [phone]. Monday - Friday 8:00AM - 5:00PM.     Sincerely,    [facility]

## 2025-03-04 RX ORDER — GABAPENTIN 300 MG/1
CAPSULE ORAL
Qty: 105 CAPSULE | Refills: 0 | Status: SHIPPED | OUTPATIENT
Start: 2025-03-04 | End: 2025-03-07

## 2025-03-05 ENCOUNTER — HOSPITAL ENCOUNTER (OUTPATIENT)
Facility: REHABILITATION | Age: 50
End: 2025-03-05
Attending: STUDENT IN AN ORGANIZED HEALTH CARE EDUCATION/TRAINING PROGRAM | Admitting: STUDENT IN AN ORGANIZED HEALTH CARE EDUCATION/TRAINING PROGRAM
Payer: COMMERCIAL

## 2025-03-05 ENCOUNTER — APPOINTMENT (OUTPATIENT)
Dept: RADIOLOGY | Facility: REHABILITATION | Age: 50
End: 2025-03-05
Attending: STUDENT IN AN ORGANIZED HEALTH CARE EDUCATION/TRAINING PROGRAM
Payer: COMMERCIAL

## 2025-03-05 VITALS
TEMPERATURE: 97.9 F | OXYGEN SATURATION: 96 % | WEIGHT: 141.09 LBS | HEART RATE: 81 BPM | DIASTOLIC BLOOD PRESSURE: 90 MMHG | RESPIRATION RATE: 16 BRPM | HEIGHT: 63 IN | BODY MASS INDEX: 25 KG/M2 | SYSTOLIC BLOOD PRESSURE: 126 MMHG

## 2025-03-05 PROCEDURE — 64493 INJ PARAVERT F JNT L/S 1 LEV: CPT

## 2025-03-05 PROCEDURE — A9579 GAD-BASE MR CONTRAST NOS,1ML: HCPCS | Mod: JZ

## 2025-03-05 PROCEDURE — 64494 INJ PARAVERT F JNT L/S 2 LEV: CPT

## 2025-03-05 PROCEDURE — 700111 HCHG RX REV CODE 636 W/ 250 OVERRIDE (IP)

## 2025-03-05 PROCEDURE — 700117 HCHG RX CONTRAST REV CODE 255: Mod: JZ

## 2025-03-05 RX ORDER — BUPIVACAINE HYDROCHLORIDE 5 MG/ML
INJECTION, SOLUTION EPIDURAL; INTRACAUDAL; PERINEURAL
Status: COMPLETED
Start: 2025-03-05 | End: 2025-03-05

## 2025-03-05 RX ORDER — LIDOCAINE HYDROCHLORIDE 10 MG/ML
INJECTION, SOLUTION EPIDURAL; INFILTRATION; INTRACAUDAL; PERINEURAL
Status: COMPLETED
Start: 2025-03-05 | End: 2025-03-05

## 2025-03-05 RX ADMIN — GADOTERIDOL 5 ML: 279.3 INJECTION, SOLUTION INTRAVENOUS at 15:30

## 2025-03-05 RX ADMIN — LIDOCAINE HYDROCHLORIDE 10 ML: 10 INJECTION, SOLUTION EPIDURAL; INFILTRATION; INTRACAUDAL; PERINEURAL at 15:30

## 2025-03-05 RX ADMIN — BUPIVACAINE HYDROCHLORIDE 10 ML: 5 INJECTION, SOLUTION EPIDURAL; INTRACAUDAL at 15:30

## 2025-03-05 ASSESSMENT — PAIN DESCRIPTION - PAIN TYPE
TYPE: CHRONIC PAIN
TYPE: CHRONIC PAIN

## 2025-03-05 ASSESSMENT — FIBROSIS 4 INDEX: FIB4 SCORE: 0.94

## 2025-03-05 NOTE — TELEPHONE ENCOUNTER
Received request via: Pharmacy    Was the patient seen in the last year in this department? Yes    Does the patient have an active prescription (recently filled or refills available) for medication(s) requested?  yes    Pharmacy Name: [facility]    Does the patient have correction Plus and need 100-day supply? (This applies to ALL medications) Patient does not have SCP    Pharmacy comment: patient has completed taper- OK to update directions to Take 1 capsule by mouth three times daily. ?

## 2025-03-05 NOTE — INTERVAL H&P NOTE
Consented Procedure: Diagnostic medial branch blocks targeting the BILATERAL L4-5 and L5-S1 facet joints with fluoroscopic guidance #1  I have examined the patient, provided the risks, benefits, and alternatives to the procedure(s) indicated on the signed consent form, and the patient wishes to proceed.    H&P reviewed. The patient was examined and there are no changes to the H&P      Doris Nevarez M.D.  03/05/25 3:11 PM

## 2025-03-06 NOTE — OP REPORT
"Date of Service: see epic time stamp for DOS    Patient: Evelyn Mullins 50 y.o. female     MRN: [id number]     Physician/s: Doris Nevarez MD    Pre-operative Diagnosis: Lumbosacral spondylosis, facet arthropathy. The patient was NOT seen for lumbar radiculopathy today.     Post-operative Diagnosis: Lumbosacral spondylosis, facet arthropathy. The patient was NOT seen for lumbar radiculopathy today.     Procedure:  diagnostic lumbar medial branch blocks targeting the bilateral L4-L5 and L5-S1 facet joints    Description of procedure:    The risks, benefits, and alternatives of the procedure were reviewed and discussed with the patient.  Written informed consent was freely obtained. A pre-procedural time-out was conducted by the physician verifying patient’s identity, procedure to be performed, procedure site and side, and allergy verification. Appropriate equipment was determined to be in place for the procedure.     The patient's vital signs were carefully monitored before, throughout, and after the procedure.     In the fluoroscopy suite the patient was placed in a prone position and the skin was prepped and draped in the usual sterile fashion. The fluoroscope was placed over the lower back at the appropriate angles, and the targets for injection were marked. A 27g needle was placed into each of the markings at the levels below, and approx 1cc of 1% Lidocaine was injected subcutaneously into the epidermal and dermal layers. The needle was removed intact.     Using an oblique view, A 25g 3.5\" needle was then placed at the intersection of the transverse process and superior articular process at the L4-5 facet joint where the L3 medial branch runs on the right side. The needle tips were then verified by AP, oblique, and lateral views.     Using an oblique view, A 25g 3.5\" needle was then placed at the intersection of the transverse process and superior articular process at the L5-S1 facet joint where the L4 medial " "branch runs  on the right side. The needle tips were then verified by AP, oblique, and lateral views.     Using an oblique view, A 25g 3.5\" needle was then placed at the intersection of the transverse process and superior articular process at the S1 facet where the L5 dorsal ramus runs  on the right side. The needle tips were then verified by AP, oblique, and lateral views.     Using an oblique view, A 25g 3.5\" needle was then placed at the intersection of the transverse process and superior articular process at the L4-5 facet joint where the L3 medial branch runs on the left side. The needle tips were then verified by AP, oblique, and lateral views.     Using an oblique view, A 25g 3.5\" needle was then placed at the intersection of the transverse process and superior articular process at the L5-S1 facet joint where the L4 medial branch runs  on the left side. The needle tips were then verified by AP, oblique, and lateral views.     Using an oblique view, A 25g 3.5\" needle was then placed at the intersection of the transverse process and superior articular process at the S1 facet where the L5 dorsal ramus runs  on the left side. The needle tips were then verified by AP, oblique, and lateral views.      In the AP view, less than 1 cc of contrast dye was used to highlight the medial branch while the fluoroscope was running live at the levels above. Following negative aspiration, approximately 0.5 ml. of  0.5% bupivacaine was then injected at the above levels, and the needles were removed intact after restyleted. The patient's back was covered with a 4x4 gauze, the area was cleansed with sterile normal saline, and a dressing was applied.       There were no complications noted, the patient was hemodynamically stable, and tolerated the procedure well.     Pre-procedure pain score: 6/10 on NRS  Post-procedure pain score: 1/10 on NRS  Greater than 50% relief of the patient's index pain was achieved after the " procedure.    Follow-up as scheduled    Doris Nevarez MD  Interventional Pain and Spine  Physical Medicine and Rehabilitation  [facility]      CPT  Intraarticular joint or medial branch block (MBB) - lumbar or sacral (1st level):  [id number]  Intraarticular joint or medial branch block (MBB) - lumbar or sacral (2nd level):  [id number]

## 2025-03-07 RX ORDER — GABAPENTIN 300 MG/1
300 CAPSULE ORAL 3 TIMES DAILY
Qty: 90 CAPSULE | Refills: 2 | Status: SHIPPED | OUTPATIENT
Start: 2025-03-07

## 2025-03-11 ENCOUNTER — OFFICE VISIT (OUTPATIENT)
Dept: PHYSICAL MEDICINE AND REHAB | Facility: MEDICAL CENTER | Age: 50
End: 2025-03-11
Payer: COMMERCIAL

## 2025-03-11 VITALS
TEMPERATURE: 98.5 F | SYSTOLIC BLOOD PRESSURE: 119 MMHG | OXYGEN SATURATION: 100 % | BODY MASS INDEX: 25.2 KG/M2 | HEIGHT: 63 IN | WEIGHT: 142.2 LBS | HEART RATE: 68 BPM | DIASTOLIC BLOOD PRESSURE: 84 MMHG

## 2025-03-11 DIAGNOSIS — M54.16 LEFT LUMBAR RADICULITIS: ICD-10-CM

## 2025-03-11 DIAGNOSIS — M48.061 LUMBAR STENOSIS WITHOUT NEUROGENIC CLAUDICATION: ICD-10-CM

## 2025-03-11 DIAGNOSIS — M54.50 CHRONIC BILATERAL LOW BACK PAIN WITHOUT SCIATICA: ICD-10-CM

## 2025-03-11 DIAGNOSIS — M51.369 ANNULAR TEAR OF LUMBAR DISC: ICD-10-CM

## 2025-03-11 DIAGNOSIS — G89.29 OTHER CHRONIC PAIN: ICD-10-CM

## 2025-03-11 DIAGNOSIS — M47.819 FACET ARTHROPATHY: ICD-10-CM

## 2025-03-11 DIAGNOSIS — M47.816 LUMBAR SPONDYLOSIS: ICD-10-CM

## 2025-03-11 DIAGNOSIS — G89.29 CHRONIC BILATERAL LOW BACK PAIN WITHOUT SCIATICA: ICD-10-CM

## 2025-03-11 PROCEDURE — 99214 OFFICE O/P EST MOD 30 MIN: CPT | Performed by: STUDENT IN AN ORGANIZED HEALTH CARE EDUCATION/TRAINING PROGRAM

## 2025-03-11 PROCEDURE — 3079F DIAST BP 80-89 MM HG: CPT | Performed by: STUDENT IN AN ORGANIZED HEALTH CARE EDUCATION/TRAINING PROGRAM

## 2025-03-11 PROCEDURE — 3074F SYST BP LT 130 MM HG: CPT | Performed by: STUDENT IN AN ORGANIZED HEALTH CARE EDUCATION/TRAINING PROGRAM

## 2025-03-11 PROCEDURE — 1125F AMNT PAIN NOTED PAIN PRSNT: CPT | Performed by: STUDENT IN AN ORGANIZED HEALTH CARE EDUCATION/TRAINING PROGRAM

## 2025-03-11 ASSESSMENT — PAIN SCALES - GENERAL: PAINLEVEL_OUTOF10: 8=MODERATE-SEVERE PAIN

## 2025-03-11 ASSESSMENT — FIBROSIS 4 INDEX: FIB4 SCORE: 0.94

## 2025-03-11 NOTE — H&P (VIEW-ONLY)
Verbal consent was acquired by the patient to use [employer] ambient listening note generation during this visit Yes     Follow-up patient Note    Interventional Pain and Spine  Physiatry (Physical Medicine and Rehabilitation)     Patient Name: Evelyn Mullins  : 1975  Date of service: 3/11/2025    Chief Complaint:   Chief Complaint   Patient presents with    Follow-Up     Post SP       HISTORY FROM INITIAL VISIT (2025):     History of Present Illness  Evelyn Mullins is a 50-year-old female who presents for a new patient visit.     She reports experiencing back pain, which began in early  as left-sided discomfort, initially attributed to her piriformis muscle. The pain radiated down her leg, prompting her to seek physical therapy. Despite several months of therapy, her condition did not improve, leading her to consult with Dr. Maciel. A pelvic and lumbar MRI was performed, but no specific diagnosis was made. An injection was suggested, but she declined. Instead, she opted for acupuncture, which significantly alleviated her symptoms after three sessions. However, in , she began experiencing generalized back pain. In 2024, she sustained a left Achilles injury, which was managed by her physical therapist. As her Achilles injury improved, her lower back pain worsened, particularly with rotational movements. Her physical therapist identified instabilities in her lower lumbar facet joints or SI joint, and they have been focusing on core stability without significant progress.       Her movement patterns have been distorted due to her back issues. She reports no radiating pain down her right leg but feels discomfort in her hip. She has not been able to correlate her pain with any specific activities. She finds it difficult to rise from a chair and experiences pain upon waking up in the morning. Prolonged sitting followed by standing exacerbates her pain, but walking provides some  relief. She occasionally experiences tingling in her left leg, but it is less frequent and intense than when it first started in 2023. She reports no weakness, numbness, or tingling in her legs. She has tried acupuncture again this fall, focusing on her back, but it did not help.      She has been off tamoxifen for 6 weeks since 2024, during which she experienced a brief period of relief. She resumed tamoxifen at a lower dose in 2025 but discontinued it a week ago. Her oncologist suggested a possible link between tamoxifen and her symptoms, however she denies significant pain all over with resuming tamoxifen.     She reports constant discomfort at her left torso and upper extremity after surgery and radiation for breast cancer, which is present even at rest.     Her physical therapist at [facility] has been dry needling her lower lumbar and SI joint. She finds that consciously engaging her abs and standing up reduces her pain. She takes ibuprofen 200 mg as needed for pain relief. She has not tried gabapentin or duloxetine for her radiation-induced pain. She is considering Cymbalta as suggested by her primary care physician. She is not interested in taking Effexor due to possible side effects. She has a new MRI scheduled at [facility]. She reports no pregnancy, diabetes, pacemaker or wiring, or claustrophobia.     MEDICATIONS  Current: tamoxifen, ibuprofen    HPI  Today's visit   Evelyn Mullins ( 1975) is a female with Diagnoses of Lumbar spondylosis, Annular tear of L5-S1 disc, Chronic bilateral low back pain without sciatica, Lumbar stenosis without neurogenic claudication, Facet arthropathy, Left lumbar radiculitis, and Other chronic pain were pertinent to this visit.    History of Present Illness  The patient presents for evaluation of back pain.    She reports an initial period of over 80% pain relief after diagnostic lumbar medial branch blocks targeting Bilateral  L4-L5 and L5-S1 facet joints. By the following morning, the pain had returned to its baseline level. She describes the past two days as particularly distressing due to the severity of her pain. She has been on a regimen of gabapentin 900 mg at night for the past week, which initially provided significant relief but has since become less effective. She is unable to take the medication during the day due to associated drowsiness, which impairs her ability to drive. She has not yet tried increasing the dose to 1200 mg. She reports that physical activity, such as running and skate skiing, generally improves her symptoms, but excessive activity can exacerbate her pain. She continues to engage in physical therapy at [facility], focusing on core strengthening exercises and learning techniques to alleviate pressure on her facet joints. Despite these efforts, she reports no improvement in her condition, although she does feel better immediately after each therapy session. She also reports that certain movements, such as arching her back or lifting her foot, are particularly challenging.    MEDICATIONS  gabapentin    Pain severity 8/10 currently  Pt denies new numbness, tingling, or weakness.    Procedure history:  - 3/5/25 diagnostic lumbar medial branch blocks targeting the bilateral L4-L5 and L5-S1 facet joints -over 80% relief of index pain      ROS:   Red Flags ROS:   Fever, Chills, Sweats: Denies  Involuntary Weight Loss: Denies  Bladder Incontinence: Denies  Bowel Incontinence: denies  Saddle Anesthesia: Denies    All other systems reviewed and negative.     PMHx:   Past Medical History:   Diagnosis Date    Bronchitis     Cancer (HCC) 05/30/23    High cholesterol     Technically high but not being treated    Malignant neoplasm of upper-outer quadrant of left breast in female, estrogen receptor positive (HCC) 6/20/2023    Urinary incontinence     Sneezing, jumping       PSHx:   Past Surgical History:   Procedure Laterality  Date    LUMBAR MEDIAL BRANCH BLOCKS Bilateral 3/5/2025    Procedure: Diagnostic medial branch blocks targeting the BILATERAL L4-5 and L5-S1 facet joints with fluoroscopic guidance #1;  Surgeon: Doris Nevarez M.D.;  Location: [facility];  Service: Pain Management    PB MASTECTOMY, PARTIAL Left 6/27/2023    Procedure: WIRE LOCALIZED LEFT PARTIAL MASTECTOMY, MASTOPEXY, LEFT SENTINEL LYMPH INJECTION WITH EXCISION OF AXILLARY NODES;  Surgeon: Antionette Au M.D.;  Location: SURGERY SAME DAY [facility];  Service: General    KY SUSPENSION OF BREAST Left 6/27/2023    Procedure: MASTOPEXY;  Surgeon: Antionette Au M.D.;  Location: SURGERY SAME DAY [facility];  Service: General    NODE BIOPSY SENTINEL Left 6/27/2023    Procedure: BIOPSY, LYMPH NODE, SENTINEL;  Surgeon: Antionette Au M.D.;  Location: SURGERY SAME DAY [facility];  Service: General    NO PERTINENT PAST SURGICAL HISTORY         Family Hx:   History reviewed. No pertinent family history.    Social Hx:  Social History     Socioeconomic History    Marital status: Single     Spouse name: Not on file    Number of children: Not on file    Years of education: Not on file    Highest education level: Not on file   Occupational History    Not on file   Tobacco Use    Smoking status: Never     Passive exposure: Never    Smokeless tobacco: Never   Vaping Use    Vaping status: Never Used   Substance and Sexual Activity    Alcohol use: Not Currently    Drug use: Not Currently     Types: Inhaled     Comment: Tried marijuana a few times    Sexual activity: Not on file   Other Topics Concern    Not on file   Social History Narrative    Not on file     Social Drivers of Health     Financial Resource Strain: Not on file   Food Insecurity: Not on file   Transportation Needs: Not on file   Physical Activity: Not on file   Stress: Not on file   Social Connections: Not on file   Intimate Partner Violence: Not on file   Housing Stability: Not on file        Allergies:  Allergies   Allergen Reactions    Pcn [Penicillins] Anaphylaxis    Shellfish Allergy Swelling       Medications: reviewed on epic.   Outpatient Medications Marked as Taking for the 3/11/25 encounter (Office Visit) with Doris Nevarez M.D.   Medication Sig Dispense Refill    gabapentin (NEURONTIN) 300 MG Cap Take 1 Capsule by mouth 3 times a day. 90 Capsule 2    levothyroxine (SYNTHROID) 200 MCG Tab       tamoxifen (NOLVADEX) 10 MG Tab Take 0.5 Tablets by mouth every day. 90 Tablet 3    Omega-3 Fatty Acids (FISH OIL) 1000 MG Cap capsule Take 1,000 mg by mouth 3 times a day with meals.      EPSOLAY 5 % cream       Melatonin 1 MG/4ML Liquid       Probiotic Product (PROBIOTIC + TURMERIC EXTRACT) 400 MG Cap       glutamine 500 mg/mL oral suspension       fluticasone (FLONASE) 50 MCG/ACT nasal spray Administer 1 Spray into affected nostril(S) every day.      VITAMIN D PO Take 2,000 Int'l Units by mouth every day.      MAGNESIUM PO Take 400 mg by mouth every day.      multivitamin Tab Take 1 Tablet by mouth every day.      Cetirizine HCl (ZYRTEC ALLERGY PO) Take 10 mg by mouth every day.          Current Outpatient Medications on File Prior to Visit   Medication Sig Dispense Refill    gabapentin (NEURONTIN) 300 MG Cap Take 1 Capsule by mouth 3 times a day. 90 Capsule 2    levothyroxine (SYNTHROID) 200 MCG Tab       tamoxifen (NOLVADEX) 10 MG Tab Take 0.5 Tablets by mouth every day. 90 Tablet 3    Omega-3 Fatty Acids (FISH OIL) 1000 MG Cap capsule Take 1,000 mg by mouth 3 times a day with meals.      EPSOLAY 5 % cream       Melatonin 1 MG/4ML Liquid       Probiotic Product (PROBIOTIC + TURMERIC EXTRACT) 400 MG Cap       glutamine 500 mg/mL oral suspension       fluticasone (FLONASE) 50 MCG/ACT nasal spray Administer 1 Spray into affected nostril(S) every day.      VITAMIN D PO Take 2,000 Int'l Units by mouth every day.      MAGNESIUM PO Take 400 mg by mouth every day.      multivitamin Tab Take 1 Tablet  "by mouth every day.      Cetirizine HCl (ZYRTEC ALLERGY PO) Take 10 mg by mouth every day.       No current facility-administered medications on file prior to visit.         EXAMINATION     Physical Exam:   /84 (BP Location: Right arm, Patient Position: Sitting, BP Cuff Size: Adult)   Pulse 68   Temp 36.9 °C (98.5 °F) (Temporal)   Ht 1.6 m (5' 3\")   Wt 64.5 kg (142 lb 3.2 oz)   SpO2 100%     Constitutional:   Body Habitus: Body mass index is 25.19 kg/m².  Cooperation: Fully cooperates with exam  Appearance: Well-groomed, well-nourished.    Eyes: No scleral icterus to suggest severe liver disease, no proptosis to suggest severe hyperthyroidism    ENT -no obvious auditory deficits, no noticeable facial droop     Skin -no rashes or lesions noted     Respiratory-  breathing comfortably on room air, no audible wheezing    Cardiovascular-distal extremities warm and well perfused.  No lower extremity edema is noted.     Gastrointestinal - no obvious abdominal masses, non-distended    Psychiatric- alert and oriented ×3. Normal affect.     Gait - normal gait, no use of ambulatory device, nonantalgic.     Musculoskeletal and Neuro -      Thoracic/Lumbar Spine/Sacral Spine/Hips   Inspection: No evidence of atrophy in bilateral lower extremities throughout      There is limited active range of motion with lumbar extension, possibly secondary to pain     Facet loading maneuver positive bilaterally     Palpation:   Tenderness to palpation over the paraspinal muscles bilaterally, lumbar facets bilaterally     Lumbar spine /hip provocative exam maneuvers  Slump test negative bilaterally         Key points for the international standards for neurological classification of spinal cord injury (ISNCSCI) to light touch.   Dermatome R L   L2 2 2   L3 2 2   L4 2 2   L5 2 2   S1 2 2   S2 2 2         Motor Exam Lower Extremities  ? Myotome R L   Hip flexion L2 5 5   Knee extension L3 5 5   Ankle dorsiflexion L4 5 5   Toe " "extension L5 5 5   Ankle plantarflexion S1 5 5       previous exam  Reflexes  ?   R L   Patella   2+ 2+   Achilles    2+ 2+      Clonus of the ankle negative bilaterally          MEDICAL DECISION MAKING    Medical records review: see under HPI section.     DATA    Labs: No new labs available for review since last visit.   Lab Results   Component Value Date/Time    SODIUM 141 11/27/2024 05:22 AM    SODIUM 136 06/20/2023 03:40 PM    POTASSIUM 4.4 11/27/2024 05:22 AM    POTASSIUM 3.9 06/20/2023 03:40 PM    CHLORIDE 109 (H) 11/27/2024 05:22 AM    CHLORIDE 102 06/20/2023 03:40 PM    CO2 20 11/27/2024 05:22 AM    CO2 25 06/20/2023 03:40 PM    ANION 9.0 06/20/2023 03:40 PM    GLUCOSE 86 11/27/2024 05:22 AM    GLUCOSE 83 06/20/2023 03:40 PM    BUN 22 11/27/2024 05:22 AM    BUN 18 06/20/2023 03:40 PM    CREATININE 0.99 11/27/2024 05:22 AM    CREATININE 0.96 06/20/2023 03:40 PM    CALCIUM 9.4 11/27/2024 05:22 AM    CALCIUM 9.7 06/20/2023 03:40 PM    ASTSGOT 22 11/27/2024 05:22 AM    ASTSGOT 20 11/22/2014 08:09 AM    ALTSGPT 15 11/27/2024 05:22 AM    ALTSGPT 15 11/22/2014 08:09 AM    TBILIRUBIN 0.3 11/27/2024 05:22 AM    TBILIRUBIN 0.4 11/22/2014 08:09 AM    ALBUMIN 4.2 11/27/2024 05:22 AM    ALBUMIN 4.3 11/22/2014 08:09 AM    TOTPROTEIN 6.5 11/27/2024 05:22 AM    TOTPROTEIN 7.2 11/22/2014 08:09 AM    GLOBULIN 2.3 11/27/2024 05:22 AM    GLOBULIN 2.9 11/22/2014 08:09 AM    AGRATIO 1.5 11/22/2014 08:09 AM       No results found for: \"PROTHROMBTM\", \"INR\"     Lab Results   Component Value Date/Time    WBC 5.4 12/24/2024 04:15 AM    WBC 9.5 06/20/2023 03:40 PM    RBC 4.19 12/24/2024 04:15 AM    RBC 3.80 (L) 06/20/2023 03:40 PM    HEMOGLOBIN 13.6 12/24/2024 04:15 AM    HEMOGLOBIN 12.8 06/20/2023 03:40 PM    HEMATOCRIT 41.3 12/24/2024 04:15 AM    HEMATOCRIT 38.4 06/20/2023 03:40 PM    MCV 99 (H) 12/24/2024 04:15 AM    .1 (H) 06/20/2023 03:40 PM    MCH 32.5 12/24/2024 04:15 AM    MCH 33.7 (H) 06/20/2023 03:40 PM    MCHC 32.9 " "12/24/2024 04:15 AM    MCHC 33.3 06/20/2023 03:40 PM    MPV 10.3 06/20/2023 03:40 PM    NEUTSPOLYS 39 12/24/2024 04:15 AM    NEUTSPOLYS 65.30 06/20/2023 03:40 PM    LYMPHOCYTES 45 12/24/2024 04:15 AM    LYMPHOCYTES 25.40 06/20/2023 03:40 PM    MONOCYTES 7 12/24/2024 04:15 AM    MONOCYTES 5.20 06/20/2023 03:40 PM    EOSINOPHILS 8 12/24/2024 04:15 AM    EOSINOPHILS 3.40 06/20/2023 03:40 PM    BASOPHILS 1 12/24/2024 04:15 AM    BASOPHILS 0.50 06/20/2023 03:40 PM        No results found for: \"HBA1C\"     Imaging:   I personally reviewed following images, these are my reads  MRI lumbar spine 10/19/2023  Annular tear at L5-S1.  Moderate central canal stenosis at L4-5.  Mild central canal stenosis at L3-4.  At L2-3 and L3-4 and L4-5 and L5-S1 there is mild bilateral neuroforaminal stenosis.  Facet joint synovitis at bilateral L4-5.  Facet arthropathy at bilateral L5-S1. See formal radiology report for further details.     MRI lumbar spine 10/19/2023  No evidence of sacroiliitis.    MRI lumbar spine 2/26/2025  At L4-5 there is moderate central canal stenosis and mild bilateral neuroforaminal stenosis worse on the right.  At L5-S1 there is mild bilateral neuroforaminal stenosis.  Annular tear at L5-S1.  Facet arthropathy at multiple levels most severe at bilateral L4-5. See formal radiology report for further details.      IMAGING radiology reads. I reviewed the following radiology reads                      Results for orders placed during the hospital encounter of 10/19/23    MR-LUMBAR SPINE-W/O    Impression  1.  Multilevel degenerative disc disease and facet degeneration. There is moderate central canal narrowing at L4-5, mild central canal narrowing at L3-4 and borderline central canal narrowing at L2-3.    2.  Mild multilevel neural foraminal narrowing as specifically described above.    3.  Mild multilevel degenerative subluxation.        Results for orders placed during the hospital encounter of 10/19/23    MR-LUMBAR " SPINE-W/O    Impression  1.  Multilevel degenerative disc disease and facet degeneration. There is moderate central canal narrowing at L4-5, mild central canal narrowing at L3-4 and borderline central canal narrowing at L2-3.    2.  Mild multilevel neural foraminal narrowing as specifically described above.    3.  Mild multilevel degenerative subluxation.               Results for orders placed during the hospital encounter of 10/19/23    MR-PELVIS W/O    Impression  1.  No evidence of marrow edema or arthropathy of the hip articulations.    2.  No evidence of sacroiliitis.    3.  No evidence of muscle or tendon injury.    4.  Osteitis symphysis pubis.                              Results for orders placed in visit on 21    DX-CLAVICLE LEFT                                            Diagnosis  Visit Diagnoses     ICD-10-CM   1. Lumbar spondylosis  M47.816   2. Annular tear of L5-S1 disc  M51.369   3. Chronic bilateral low back pain without sciatica  M54.50    G89.29   4. Lumbar stenosis without neurogenic claudication  M48.061   5. Facet arthropathy  M47.819   6. Left lumbar radiculitis  M54.16   7. Other chronic pain  G89.29           ASSESSMENT AND PLAN:  Evelyn Mullins (: 1975) is a female with bilateral low back pain that appears to be facetogenic bilaterally, reproduced with positive facet loading maneuver and with facet arthropathy seen on MRI lumbar spine corresponding to her areas of pain.  Also with episodic low back pain that appears possibly secondary to L5-S1 annular tear.     Also with left sided thoracic/UE pain after radiation for left breast cancer     Boogie was seen today for follow-up.    Diagnoses and all orders for this visit:    Lumbar spondylosis  -     Referral to Pain Clinic    Annular tear of L5-S1 disc    Chronic bilateral low back pain without sciatica    Lumbar stenosis without neurogenic claudication    Facet arthropathy    Left lumbar radiculitis    Other chronic  pain            PLAN  Physical Therapy: cont PT. At this time the patient continues to have severe low back pain despite completing over 3 months of PT     Diagnostic workup:no new imaging needed at this time     Medications:   - OK to continue ibuprofen 200mg PRN which significantly helps. Patient would like to minimize medication intake.   -continue gabapentin which is improving postradiation pain at her left torso and upper extremity.  Discussed that to minimize cognitive side effects during the day, I would recommend consolidating her gabapentin to nightly dosing.  Discussed that I would uptitrate her dose to 1200 mg nightly if tolerated, given her lack of significant relief with 900 mg nightly dosing.    - I have discussed trial of Cymbalta versus Effexor, she would like to defer these at this time due to side effects that could occur with weaning this medication     Interventions:   - discussed diagnostic lumbar medial branch blocks #2 targeting Bilateral L4-L5 and L5-S1 facet joints given over 80% pain relief from diagnostic lumbar medial branch blocks #1 targeting Bilateral L4-L5 and L5-S1 facet joints. The risks, benefits, and alternatives to this procedure were discussed and the patient wishes to proceed with the procedure. Risks include but are not limited to damage to surrounding structures, infection, bleeding, worsening of pain which can be permanent, and weakness which can be permanent. Benefits include pain relief and improved function. Alternatives include not doing the procedure.    - discussed possible left sided epidural steroid injection if radicular pain worsens.  Currently it is tolerable.     Other  - has done acupuncture with significant relief of left piriformis pain but no significant relief of low back pain most recently   - of note patient was on tamoxifen for 1 year. She went off of tamoxifen for 6 weeks starting in Nov, and during this time she had significant improvement in back pain  for 2 weeks but unfortunately the pain worsened since. She notes hx of left foot pain that was thought to be an achilles injury but denies significant pain in all of her joints.  I have discussed that I would expect for tamoxifen to increase her pain more diffusely then only in her low back.  I shared this information with her oncology team    Follow-up: 3-7 days after LMBB    Orders Placed This Encounter    Referral to Pain Clinic       Doris Nevarez MD  Interventional Pain and Spine  Physical Medicine and Rehabilitation  [facility]      The above note documents my personal evaluation of this patient. In addition, I have reviewed and confirmed with the patient and MA the supportive information documented in today's Patient Health Questionnaire and Office Note.     Please note that this dictation was created using voice recognition software. I have made every reasonable attempt to correct obvious errors, but I expect that there are errors of grammar and possibly content that I did not discover before finalizing the note.

## 2025-03-11 NOTE — PROGRESS NOTES
Verbal consent was acquired by the patient to use [employer] ambient listening note generation during this visit Yes     Follow-up patient Note    Interventional Pain and Spine  Physiatry (Physical Medicine and Rehabilitation)     Patient Name: Evelyn Mullins  : 1975  Date of service: 3/11/2025    Chief Complaint:   Chief Complaint   Patient presents with    Follow-Up     Post SP       HISTORY FROM INITIAL VISIT (2025):     History of Present Illness  Evelyn Mullins is a 50-year-old female who presents for a new patient visit.     She reports experiencing back pain, which began in early  as left-sided discomfort, initially attributed to her piriformis muscle. The pain radiated down her leg, prompting her to seek physical therapy. Despite several months of therapy, her condition did not improve, leading her to consult with Dr. Maciel. A pelvic and lumbar MRI was performed, but no specific diagnosis was made. An injection was suggested, but she declined. Instead, she opted for acupuncture, which significantly alleviated her symptoms after three sessions. However, in , she began experiencing generalized back pain. In 2024, she sustained a left Achilles injury, which was managed by her physical therapist. As her Achilles injury improved, her lower back pain worsened, particularly with rotational movements. Her physical therapist identified instabilities in her lower lumbar facet joints or SI joint, and they have been focusing on core stability without significant progress.       Her movement patterns have been distorted due to her back issues. She reports no radiating pain down her right leg but feels discomfort in her hip. She has not been able to correlate her pain with any specific activities. She finds it difficult to rise from a chair and experiences pain upon waking up in the morning. Prolonged sitting followed by standing exacerbates her pain, but walking provides some  relief. She occasionally experiences tingling in her left leg, but it is less frequent and intense than when it first started in 2023. She reports no weakness, numbness, or tingling in her legs. She has tried acupuncture again this fall, focusing on her back, but it did not help.      She has been off tamoxifen for 6 weeks since 2024, during which she experienced a brief period of relief. She resumed tamoxifen at a lower dose in 2025 but discontinued it a week ago. Her oncologist suggested a possible link between tamoxifen and her symptoms, however she denies significant pain all over with resuming tamoxifen.     She reports constant discomfort at her left torso and upper extremity after surgery and radiation for breast cancer, which is present even at rest.     Her physical therapist at [facility] [place] has been dry needling her lower lumbar and SI joint. She finds that consciously engaging her abs and standing up reduces her pain. She takes ibuprofen 200 mg as needed for pain relief. She has not tried gabapentin or duloxetine for her radiation-induced pain. She is considering Cymbalta as suggested by her primary care physician. She is not interested in taking Effexor due to possible side effects. She has a new MRI scheduled at [facility]. She reports no pregnancy, diabetes, pacemaker or wiring, or claustrophobia.     MEDICATIONS  Current: tamoxifen, ibuprofen    HPI  Today's visit   Evelyn Mullins ( 1975) is a female with Diagnoses of Lumbar spondylosis, Annular tear of L5-S1 disc, Chronic bilateral low back pain without sciatica, Lumbar stenosis without neurogenic claudication, Facet arthropathy, Left lumbar radiculitis, and Other chronic pain were pertinent to this visit.    History of Present Illness  The patient presents for evaluation of back pain.    She reports an initial period of over 80% pain relief after diagnostic lumbar medial branch blocks targeting Bilateral  L4-L5 and L5-S1 facet joints. By the following morning, the pain had returned to its baseline level. She describes the past two days as particularly distressing due to the severity of her pain. She has been on a regimen of gabapentin 900 mg at night for the past week, which initially provided significant relief but has since become less effective. She is unable to take the medication during the day due to associated drowsiness, which impairs her ability to drive. She has not yet tried increasing the dose to 1200 mg. She reports that physical activity, such as running and skate skiing, generally improves her symptoms, but excessive activity can exacerbate her pain. She continues to engage in physical therapy at [email or web address] focusing on core strengthening exercises and learning techniques to alleviate pressure on her facet joints. Despite these efforts, she reports no improvement in her condition, although she does feel better immediately after each therapy session. She also reports that certain movements, such as arching her back or lifting her foot, are particularly challenging.    MEDICATIONS  gabapentin    Pain severity 8/10 currently  Pt denies new numbness, tingling, or weakness.    Procedure history:  - 3/5/25 diagnostic lumbar medial branch blocks targeting the bilateral L4-L5 and L5-S1 facet joints -over 80% relief of index pain      ROS:   Red Flags ROS:   Fever, Chills, Sweats: Denies  Involuntary Weight Loss: Denies  Bladder Incontinence: Denies  Bowel Incontinence: denies  Saddle Anesthesia: Denies    All other systems reviewed and negative.     PMHx:   Past Medical History:   Diagnosis Date    Bronchitis     Cancer (HCC) 05/30/23    High cholesterol     Technically high but not being treated    Malignant neoplasm of upper-outer quadrant of left breast in female, estrogen receptor positive (HCC) 6/20/2023    Urinary incontinence     Sneezing, jumping       PSHx:   Past Surgical History:   Procedure Laterality  Date    LUMBAR MEDIAL BRANCH BLOCKS Bilateral 3/5/2025    Procedure: Diagnostic medial branch blocks targeting the BILATERAL L4-5 and L5-S1 facet joints with fluoroscopic guidance #1;  Surgeon: Doris Nevarez M.D.;  Location: [facility];  Service: Pain Management    PB MASTECTOMY, PARTIAL Left 6/27/2023    Procedure: WIRE LOCALIZED LEFT PARTIAL MASTECTOMY, MASTOPEXY, LEFT SENTINEL LYMPH INJECTION WITH EXCISION OF AXILLARY NODES;  Surgeon: Antionette Au M.D.;  Location: SURGERY SAME DAY [facility];  Service: General    AZ SUSPENSION OF BREAST Left 6/27/2023    Procedure: MASTOPEXY;  Surgeon: Antionette Au M.D.;  Location: SURGERY SAME DAY [facility];  Service: General    NODE BIOPSY SENTINEL Left 6/27/2023    Procedure: BIOPSY, LYMPH NODE, SENTINEL;  Surgeon: Antionette Au M.D.;  Location: SURGERY SAME DAY [facility];  Service: General    NO PERTINENT PAST SURGICAL HISTORY         Family Hx:   History reviewed. No pertinent family history.    Social Hx:  Social History     Socioeconomic History    Marital status: Single     Spouse name: Not on file    Number of children: Not on file    Years of education: Not on file    Highest education level: Not on file   Occupational History    Not on file   Tobacco Use    Smoking status: Never     Passive exposure: Never    Smokeless tobacco: Never   Vaping Use    Vaping status: Never Used   Substance and Sexual Activity    Alcohol use: Not Currently    Drug use: Not Currently     Types: Inhaled     Comment: Tried marijuana a few times    Sexual activity: Not on file   Other Topics Concern    Not on file   Social History Narrative    Not on file     Social Drivers of Health     Financial Resource Strain: Not on file   Food Insecurity: Not on file   Transportation Needs: Not on file   Physical Activity: Not on file   Stress: Not on file   Social Connections: Not on file   Intimate Partner Violence: Not on file   Housing Stability: Not on file        Allergies:  Allergies   Allergen Reactions    Pcn [Penicillins] Anaphylaxis    Shellfish Allergy Swelling       Medications: reviewed on epic.   Outpatient Medications Marked as Taking for the 3/11/25 encounter (Office Visit) with Doris Nevarez M.D.   Medication Sig Dispense Refill    gabapentin (NEURONTIN) 300 MG Cap Take 1 Capsule by mouth 3 times a day. 90 Capsule 2    levothyroxine (SYNTHROID) 200 MCG Tab       tamoxifen (NOLVADEX) 10 MG Tab Take 0.5 Tablets by mouth every day. 90 Tablet 3    Omega-3 Fatty Acids (FISH OIL) 1000 MG Cap capsule Take 1,000 mg by mouth 3 times a day with meals.      EPSOLAY 5 % cream       Melatonin 1 MG/4ML Liquid       Probiotic Product (PROBIOTIC + TURMERIC EXTRACT) 400 MG Cap       glutamine 500 mg/mL oral suspension       fluticasone (FLONASE) 50 MCG/ACT nasal spray Administer 1 Spray into affected nostril(S) every day.      VITAMIN D PO Take 2,000 Int'l Units by mouth every day.      MAGNESIUM PO Take 400 mg by mouth every day.      multivitamin Tab Take 1 Tablet by mouth every day.      Cetirizine HCl (ZYRTEC ALLERGY PO) Take 10 mg by mouth every day.          Current Outpatient Medications on File Prior to Visit   Medication Sig Dispense Refill    gabapentin (NEURONTIN) 300 MG Cap Take 1 Capsule by mouth 3 times a day. 90 Capsule 2    levothyroxine (SYNTHROID) 200 MCG Tab       tamoxifen (NOLVADEX) 10 MG Tab Take 0.5 Tablets by mouth every day. 90 Tablet 3    Omega-3 Fatty Acids (FISH OIL) 1000 MG Cap capsule Take 1,000 mg by mouth 3 times a day with meals.      EPSOLAY 5 % cream       Melatonin 1 MG/4ML Liquid       Probiotic Product (PROBIOTIC + TURMERIC EXTRACT) 400 MG Cap       glutamine 500 mg/mL oral suspension       fluticasone (FLONASE) 50 MCG/ACT nasal spray Administer 1 Spray into affected nostril(S) every day.      VITAMIN D PO Take 2,000 Int'l Units by mouth every day.      MAGNESIUM PO Take 400 mg by mouth every day.      multivitamin Tab Take 1 Tablet  "by mouth every day.      Cetirizine HCl (ZYRTEC ALLERGY PO) Take 10 mg by mouth every day.       No current facility-administered medications on file prior to visit.         EXAMINATION     Physical Exam:   /84 (BP Location: Right arm, Patient Position: Sitting, BP Cuff Size: Adult)   Pulse 68   Temp 36.9 °C (98.5 °F) (Temporal)   Ht 1.6 m (5' 3\")   Wt 64.5 kg (142 lb 3.2 oz)   SpO2 100%     Constitutional:   Body Habitus: Body mass index is 25.19 kg/m².  Cooperation: Fully cooperates with exam  Appearance: Well-groomed, well-nourished.    Eyes: No scleral icterus to suggest severe liver disease, no proptosis to suggest severe hyperthyroidism    ENT -no obvious auditory deficits, no noticeable facial droop     Skin -no rashes or lesions noted     Respiratory-  breathing comfortably on room air, no audible wheezing    Cardiovascular-distal extremities warm and well perfused.  No lower extremity edema is noted.     Gastrointestinal - no obvious abdominal masses, non-distended    Psychiatric- alert and oriented ×3. Normal affect.     Gait - normal gait, no use of ambulatory device, nonantalgic.     Musculoskeletal and Neuro -      Thoracic/Lumbar Spine/Sacral Spine/Hips   Inspection: No evidence of atrophy in bilateral lower extremities throughout      There is limited active range of motion with lumbar extension, possibly secondary to pain     Facet loading maneuver positive bilaterally     Palpation:   Tenderness to palpation over the paraspinal muscles bilaterally, lumbar facets bilaterally     Lumbar spine /hip provocative exam maneuvers  Slump test negative bilaterally         Key points for the international standards for neurological classification of spinal cord injury (ISNCSCI) to light touch.   Dermatome R L   L2 2 2   L3 2 2   L4 2 2   L5 2 2   S1 2 2   S2 2 2         Motor Exam Lower Extremities  ? Myotome R L   Hip flexion L2 5 5   Knee extension L3 5 5   Ankle dorsiflexion L4 5 5   Toe " "extension L5 5 5   Ankle plantarflexion S1 5 5       previous exam  Reflexes  ?   R L   Patella   2+ 2+   Achilles    2+ 2+      Clonus of the ankle negative bilaterally          MEDICAL DECISION MAKING    Medical records review: see under HPI section.     DATA    Labs: No new labs available for review since last visit.   Lab Results   Component Value Date/Time    SODIUM 141 11/27/2024 05:22 AM    SODIUM 136 06/20/2023 03:40 PM    POTASSIUM 4.4 11/27/2024 05:22 AM    POTASSIUM 3.9 06/20/2023 03:40 PM    CHLORIDE 109 (H) 11/27/2024 05:22 AM    CHLORIDE 102 06/20/2023 03:40 PM    CO2 20 11/27/2024 05:22 AM    CO2 25 06/20/2023 03:40 PM    ANION 9.0 06/20/2023 03:40 PM    GLUCOSE 86 11/27/2024 05:22 AM    GLUCOSE 83 06/20/2023 03:40 PM    BUN 22 11/27/2024 05:22 AM    BUN 18 06/20/2023 03:40 PM    CREATININE 0.99 11/27/2024 05:22 AM    CREATININE 0.96 06/20/2023 03:40 PM    CALCIUM 9.4 11/27/2024 05:22 AM    CALCIUM 9.7 06/20/2023 03:40 PM    ASTSGOT 22 11/27/2024 05:22 AM    ASTSGOT 20 11/22/2014 08:09 AM    ALTSGPT 15 11/27/2024 05:22 AM    ALTSGPT 15 11/22/2014 08:09 AM    TBILIRUBIN 0.3 11/27/2024 05:22 AM    TBILIRUBIN 0.4 11/22/2014 08:09 AM    ALBUMIN 4.2 11/27/2024 05:22 AM    ALBUMIN 4.3 11/22/2014 08:09 AM    TOTPROTEIN 6.5 11/27/2024 05:22 AM    TOTPROTEIN 7.2 11/22/2014 08:09 AM    GLOBULIN 2.3 11/27/2024 05:22 AM    GLOBULIN 2.9 11/22/2014 08:09 AM    AGRATIO 1.5 11/22/2014 08:09 AM       No results found for: \"PROTHROMBTM\", \"INR\"     Lab Results   Component Value Date/Time    WBC 5.4 12/24/2024 04:15 AM    WBC 9.5 06/20/2023 03:40 PM    RBC 4.19 12/24/2024 04:15 AM    RBC 3.80 (L) 06/20/2023 03:40 PM    HEMOGLOBIN 13.6 12/24/2024 04:15 AM    HEMOGLOBIN 12.8 06/20/2023 03:40 PM    HEMATOCRIT 41.3 12/24/2024 04:15 AM    HEMATOCRIT 38.4 06/20/2023 03:40 PM    MCV 99 (H) 12/24/2024 04:15 AM    .1 (H) 06/20/2023 03:40 PM    MCH 32.5 12/24/2024 04:15 AM    MCH 33.7 (H) 06/20/2023 03:40 PM    MCHC 32.9 " "12/24/2024 04:15 AM    MCHC 33.3 06/20/2023 03:40 PM    MPV 10.3 06/20/2023 03:40 PM    NEUTSPOLYS 39 12/24/2024 04:15 AM    NEUTSPOLYS 65.30 06/20/2023 03:40 PM    LYMPHOCYTES 45 12/24/2024 04:15 AM    LYMPHOCYTES 25.40 06/20/2023 03:40 PM    MONOCYTES 7 12/24/2024 04:15 AM    MONOCYTES 5.20 06/20/2023 03:40 PM    EOSINOPHILS 8 12/24/2024 04:15 AM    EOSINOPHILS 3.40 06/20/2023 03:40 PM    BASOPHILS 1 12/24/2024 04:15 AM    BASOPHILS 0.50 06/20/2023 03:40 PM        No results found for: \"HBA1C\"     Imaging:   I personally reviewed following images, these are my reads  MRI lumbar spine 10/19/2023  Annular tear at L5-S1.  Moderate central canal stenosis at L4-5.  Mild central canal stenosis at L3-4.  At L2-3 and L3-4 and L4-5 and L5-S1 there is mild bilateral neuroforaminal stenosis.  Facet joint synovitis at bilateral L4-5.  Facet arthropathy at bilateral L5-S1. See formal radiology report for further details.     MRI lumbar spine 10/19/2023  No evidence of sacroiliitis.    MRI lumbar spine 2/26/2025  At L4-5 there is moderate central canal stenosis and mild bilateral neuroforaminal stenosis worse on the right.  At L5-S1 there is mild bilateral neuroforaminal stenosis.  Annular tear at L5-S1.  Facet arthropathy at multiple levels most severe at bilateral L4-5. See formal radiology report for further details.      IMAGING radiology reads. I reviewed the following radiology reads                      Results for orders placed during the hospital encounter of 10/19/23    MR-LUMBAR SPINE-W/O    Impression  1.  Multilevel degenerative disc disease and facet degeneration. There is moderate central canal narrowing at L4-5, mild central canal narrowing at L3-4 and borderline central canal narrowing at L2-3.    2.  Mild multilevel neural foraminal narrowing as specifically described above.    3.  Mild multilevel degenerative subluxation.        Results for orders placed during the hospital encounter of 10/19/23    MR-LUMBAR " SPINE-W/O    Impression  1.  Multilevel degenerative disc disease and facet degeneration. There is moderate central canal narrowing at L4-5, mild central canal narrowing at L3-4 and borderline central canal narrowing at L2-3.    2.  Mild multilevel neural foraminal narrowing as specifically described above.    3.  Mild multilevel degenerative subluxation.               Results for orders placed during the hospital encounter of 10/19/23    MR-PELVIS W/O    Impression  1.  No evidence of marrow edema or arthropathy of the hip articulations.    2.  No evidence of sacroiliitis.    3.  No evidence of muscle or tendon injury.    4.  Osteitis symphysis pubis.                              Results for orders placed in visit on 21    DX-CLAVICLE LEFT                                            Diagnosis  Visit Diagnoses     ICD-10-CM   1. Lumbar spondylosis  M47.816   2. Annular tear of L5-S1 disc  M51.369   3. Chronic bilateral low back pain without sciatica  M54.50    G89.29   4. Lumbar stenosis without neurogenic claudication  M48.061   5. Facet arthropathy  M47.819   6. Left lumbar radiculitis  M54.16   7. Other chronic pain  G89.29           ASSESSMENT AND PLAN:  Evelyn Mullins (: 1975) is a female with bilateral low back pain that appears to be facetogenic bilaterally, reproduced with positive facet loading maneuver and with facet arthropathy seen on MRI lumbar spine corresponding to her areas of pain.  Also with episodic low back pain that appears possibly secondary to L5-S1 annular tear.     Also with left sided thoracic/UE pain after radiation for left breast cancer     Boogie was seen today for follow-up.    Diagnoses and all orders for this visit:    Lumbar spondylosis  -     Referral to Pain Clinic    Annular tear of L5-S1 disc    Chronic bilateral low back pain without sciatica    Lumbar stenosis without neurogenic claudication    Facet arthropathy    Left lumbar radiculitis    Other chronic  pain            PLAN  Physical Therapy: cont PT. At this time the patient continues to have severe low back pain despite completing over 3 months of PT     Diagnostic workup:no new imaging needed at this time     Medications:   - OK to continue ibuprofen 200mg PRN which significantly helps. Patient would like to minimize medication intake.   -continue gabapentin which is improving postradiation pain at her left torso and upper extremity.  Discussed that to minimize cognitive side effects during the day, I would recommend consolidating her gabapentin to nightly dosing.  Discussed that I would uptitrate her dose to 1200 mg nightly if tolerated, given her lack of significant relief with 900 mg nightly dosing.    - I have discussed trial of Cymbalta versus Effexor, she would like to defer these at this time due to side effects that could occur with weaning this medication     Interventions:   - discussed diagnostic lumbar medial branch blocks #2 targeting Bilateral L4-L5 and L5-S1 facet joints given over 80% pain relief from diagnostic lumbar medial branch blocks #1 targeting Bilateral L4-L5 and L5-S1 facet joints. The risks, benefits, and alternatives to this procedure were discussed and the patient wishes to proceed with the procedure. Risks include but are not limited to damage to surrounding structures, infection, bleeding, worsening of pain which can be permanent, and weakness which can be permanent. Benefits include pain relief and improved function. Alternatives include not doing the procedure.    - discussed possible left sided epidural steroid injection if radicular pain worsens.  Currently it is tolerable.     Other  - has done acupuncture with significant relief of left piriformis pain but no significant relief of low back pain most recently   - of note patient was on tamoxifen for 1 year. She went off of tamoxifen for 6 weeks starting in Nov, and during this time she had significant improvement in back pain  for 2 weeks but unfortunately the pain worsened since. She notes hx of left foot pain that was thought to be an achilles injury but denies significant pain in all of her joints.  I have discussed that I would expect for tamoxifen to increase her pain more diffusely then only in her low back.  I shared this information with her oncology team    Follow-up: 3-7 days after LMBB    Orders Placed This Encounter    Referral to Pain Clinic       Doris Nevarez MD  Interventional Pain and Spine  Physical Medicine and Rehabilitation  [facility]      The above note documents my personal evaluation of this patient. In addition, I have reviewed and confirmed with the patient and MA the supportive information documented in today's Patient Health Questionnaire and Office Note.     Please note that this dictation was created using voice recognition software. I have made every reasonable attempt to correct obvious errors, but I expect that there are errors of grammar and possibly content that I did not discover before finalizing the note.

## 2025-03-12 NOTE — Clinical Note
REFERRAL APPROVAL NOTICE         Sent on March 12, 2025                   Boogie Mullins  [address] [place] [address] [place] [address]                   Dear Ms. Mullins,    After a careful review of the medical information and benefit coverage, [facility] has processed your referral. See below for additional details.    If applicable, you must be actively enrolled with your insurance for coverage of the authorized service. If you have any questions regarding your coverage, please contact your insurance directly.    REFERRAL INFORMATION   Referral #:  [id number]  Referred-To Department    Referred-By Provider:  Physical Medicine and Rehab    Doris Nevarez M.D.   Pain Management       [address]  [address]  [place] [address]  [phone] [address] [place] [address]  [place] [address]  [phone]    Referral Start Date:  03/11/2025  Referral End Date:   06/12/2025             SCHEDULING  If you do not already have an appointment, please call [phone] to make an appointment.     MORE INFORMATION  If you do not already have a [employer] account, sign up at: [email or web address]  You can access your medical information, make appointments, see lab results, billing information, and more.  If you have questions regarding this referral, please contact  the [facility] Referrals department at:             [phone]. Monday - Friday 8:00AM - 5:00PM.     Sincerely,    [facility]

## 2025-03-13 ENCOUNTER — APPOINTMENT (OUTPATIENT)
Dept: URBAN - METROPOLITAN AREA CLINIC 6 | Facility: CLINIC | Age: 50
Setting detail: DERMATOLOGY
End: 2025-03-13

## 2025-03-13 DIAGNOSIS — L70.8 OTHER ACNE: ICD-10-CM | Status: INADEQUATELY CONTROLLED

## 2025-03-13 PROCEDURE — ? COUNSELING

## 2025-03-13 PROCEDURE — ? PRESCRIPTION MEDICATION MANAGEMENT

## 2025-03-13 PROCEDURE — ? INTRALESIONAL KENALOG

## 2025-03-13 PROCEDURE — 11900 INJECT SKIN LESIONS </W 7: CPT

## 2025-03-13 PROCEDURE — ? PRESCRIPTION

## 2025-03-13 RX ORDER — CLINDAMYCIN PHOSPHATE 10 MG/ML
1 SOLUTION TOPICAL BID
Qty: 30 | Refills: 0 | Status: ERX | COMMUNITY
Start: 2025-03-13

## 2025-03-13 RX ADMIN — CLINDAMYCIN PHOSPHATE 1: 10 SOLUTION TOPICAL at 00:00

## 2025-03-13 ASSESSMENT — LOCATION ZONE DERM: LOCATION ZONE: FACE

## 2025-03-13 ASSESSMENT — LOCATION DETAILED DESCRIPTION DERM: LOCATION DETAILED: RIGHT CHIN

## 2025-03-13 ASSESSMENT — LOCATION SIMPLE DESCRIPTION DERM: LOCATION SIMPLE: CHIN

## 2025-03-13 NOTE — PROCEDURE: PRESCRIPTION MEDICATION MANAGEMENT
Render In Strict Bullet Format?: No
Detail Level: Zone
Initiate Treatment: Clindamycin phosphate 1 % topical solution Bid

## 2025-03-13 NOTE — PROCEDURE: INTRALESIONAL KENALOG
Include Z78.9 (Other Specified Conditions Influencing Health Status) As An Associated Diagnosis?: No
Kenalog Preparation: Kenalog
Expiration Date For Kenalog (Optional): February 2026
Treatment Number (Optional): 1
Consent: The risks of atrophy were reviewed with the patient.
Concentration Of Kenalog Solution Injected (Mg/Ml): 5.0
Ndc# For Kenalog Only: 1375-6594-32
How Many Mls Were Removed From The 40 Mg/Ml (10ml) Vial When Preparing The Injectable Solution?: 0
Total Volume (Ccs): 0.2
Kenalog Type Of Vial: Multiple Dose
Administered By (Optional): Cony Flores MD
Detail Level: Detailed
Lot # For Kenalog (Optional): [id number]
Medical Necessity Clause: This procedure was medically necessary because the lesions that were treated were:
Validate Note Data When Using Inventory: Yes

## 2025-03-27 ENCOUNTER — APPOINTMENT (OUTPATIENT)
Dept: RADIOLOGY | Facility: REHABILITATION | Age: 50
End: 2025-03-27
Attending: STUDENT IN AN ORGANIZED HEALTH CARE EDUCATION/TRAINING PROGRAM
Payer: COMMERCIAL

## 2025-03-27 ENCOUNTER — HOSPITAL ENCOUNTER (OUTPATIENT)
Facility: REHABILITATION | Age: 50
End: 2025-03-27
Attending: STUDENT IN AN ORGANIZED HEALTH CARE EDUCATION/TRAINING PROGRAM | Admitting: STUDENT IN AN ORGANIZED HEALTH CARE EDUCATION/TRAINING PROGRAM
Payer: COMMERCIAL

## 2025-03-27 VITALS
DIASTOLIC BLOOD PRESSURE: 75 MMHG | TEMPERATURE: 97.7 F | RESPIRATION RATE: 18 BRPM | HEIGHT: 63 IN | SYSTOLIC BLOOD PRESSURE: 125 MMHG | HEART RATE: 69 BPM | WEIGHT: 141.09 LBS | BODY MASS INDEX: 25 KG/M2 | OXYGEN SATURATION: 97 %

## 2025-03-27 PROCEDURE — 700117 HCHG RX CONTRAST REV CODE 255: Mod: JZ

## 2025-03-27 PROCEDURE — A9579 GAD-BASE MR CONTRAST NOS,1ML: HCPCS | Mod: JZ

## 2025-03-27 PROCEDURE — 64494 INJ PARAVERT F JNT L/S 2 LEV: CPT

## 2025-03-27 PROCEDURE — 700111 HCHG RX REV CODE 636 W/ 250 OVERRIDE (IP): Mod: JZ

## 2025-03-27 PROCEDURE — 64493 INJ PARAVERT F JNT L/S 1 LEV: CPT

## 2025-03-27 RX ORDER — LIDOCAINE HYDROCHLORIDE 10 MG/ML
INJECTION, SOLUTION EPIDURAL; INFILTRATION; INTRACAUDAL; PERINEURAL
Status: COMPLETED
Start: 2025-03-27 | End: 2025-03-27

## 2025-03-27 RX ORDER — BUPIVACAINE HYDROCHLORIDE 5 MG/ML
INJECTION, SOLUTION EPIDURAL; INTRACAUDAL; PERINEURAL
Status: COMPLETED
Start: 2025-03-27 | End: 2025-03-27

## 2025-03-27 RX ADMIN — GADOTERIDOL 10 ML: 279.3 INJECTION, SOLUTION INTRAVENOUS at 10:05

## 2025-03-27 RX ADMIN — BUPIVACAINE HYDROCHLORIDE 10 ML: 5 INJECTION, SOLUTION EPIDURAL; INTRACAUDAL at 10:05

## 2025-03-27 RX ADMIN — LIDOCAINE HYDROCHLORIDE 10 ML: 10 INJECTION, SOLUTION EPIDURAL; INFILTRATION; INTRACAUDAL; PERINEURAL at 10:06

## 2025-03-27 ASSESSMENT — FIBROSIS 4 INDEX: FIB4 SCORE: 0.94

## 2025-03-27 ASSESSMENT — PAIN DESCRIPTION - PAIN TYPE: TYPE: CHRONIC PAIN

## 2025-03-27 NOTE — INTERVAL H&P NOTE
[facility]  GROUP DOCUMENTATION INDIVIDUAL                                                                          Group Therapy Note    Date: 1/16/2021    Group Start Time: 2000  Group End Time: 2030  Group Topic: Nursing    [facility] SPECIAL TRT[facility] 1    [place], [address] GROUP DOCUMENTATION GROUP    Group Therapy Note    Attendees:       Attendance: Attended    Patient's Goal:        Interventions/techniques: Informed    Follows Directions:  Followed directions    Interactions: Disorganized interaction    Mental Status: Anxious    Behavior/appearance: Cooperative    Goals Achieved: Able to listen to others      Additional Notes:        Carrie Soliz Consented Procedure: Diagnostic medial branch blocks targeting the BILATERAL L4-5 and L5-S1 facet joints with fluoroscopic guidance #2  I have examined the patient, provided the risks, benefits, and alternatives to the procedure(s) indicated on the signed consent form, and the patient wishes to proceed.    H&P reviewed. The patient was examined and there are no changes to the H&P      Doris Nevarez M.D.  03/27/25 9:07 AM

## 2025-03-27 NOTE — OP REPORT
"Date of Service: see epic time stamp for DOS    Patient: Evelyn Mullins 50 y.o. female     MRN: [id number]     Physician/s: Doris Nevarez MD    Pre-operative Diagnosis: Lumbosacral spondylosis, facet arthropathy. The patient was NOT seen for lumbar radiculopathy today.     Post-operative Diagnosis: Lumbosacral spondylosis, facet arthropathy. The patient was NOT seen for lumbar radiculopathy today.     Procedure:  diagnostic lumbar medial branch blocks targeting the bilateral L4-L5 and L5-S1 facet joints    Description of procedure:    The risks, benefits, and alternatives of the procedure were reviewed and discussed with the patient.  Written informed consent was freely obtained. A pre-procedural time-out was conducted by the physician verifying patient’s identity, procedure to be performed, procedure site and side, and allergy verification. Appropriate equipment was determined to be in place for the procedure.     The patient's vital signs were carefully monitored before, throughout, and after the procedure.     In the fluoroscopy suite the patient was placed in a prone position and the skin was prepped and draped in the usual sterile fashion. The fluoroscope was placed over the lower back at the appropriate angles, and the targets for injection were marked. A 27g needle was placed into each of the markings at the levels below, and approx 1cc of 1% Lidocaine was injected subcutaneously into the epidermal and dermal layers. The needle was removed intact.       Using an oblique view, A 25g 3.5\" needle was then placed at the intersection of the transverse process and superior articular process at the L4-5 facet joint where the L3 medial branch runs on the left side. The needle tips were then verified by AP, oblique, and lateral views.     Using an oblique view, A 25g 3.5\" needle was then placed at the intersection of the transverse process and superior articular process at the L5-S1 facet joint where the L4 medial " "branch runs  on the left side. The needle tips were then verified by AP, oblique, and lateral views.     Using an oblique view, A 25g 3.5\" needle was then placed at the intersection of the transverse process and superior articular process at the S1 facet where the L5 dorsal ramus runs  on the left side. The needle tips were then verified by AP, oblique, and lateral views.        Using an oblique view, A 25g 3.5\" needle was then placed at the intersection of the transverse process and superior articular process at the L4-5 facet joint where the L3 medial branch runs on the right side. The needle tips were then verified by AP, oblique, and lateral views.     Using an oblique view, A 25g 3.5\" needle was then placed at the intersection of the transverse process and superior articular process at the L5-S1 facet joint where the L4 medial branch runs  on the right side. The needle tips were then verified by AP, oblique, and lateral views.     Using an oblique view, A 25g 3.5\" needle was then placed at the intersection of the transverse process and superior articular process at the S1 facet where the L5 dorsal ramus runs  on the right side. The needle tips were then verified by AP, oblique, and lateral views.     In the AP view, less than 1 cc of contrast dye was used to highlight the medial branch while the fluoroscope was running live at the levels above. Following negative aspiration, approximately 0.5 ml. of  0.5% bupivacaine was then injected at the above levels, and the needles were removed intact after restyleted. The patient's back was covered with a 4x4 gauze, the area was cleansed with sterile normal saline, and a dressing was applied.       There were no complications noted, the patient was hemodynamically stable, and tolerated the procedure well.     Pre-procedure pain score: 8/10 on NRS  Post-procedure pain score: 0/10 on NRS  Greater than 50% relief of the patient's index pain was achieved after the " procedure.    Follow-up as scheduled    Doirs Nevarez MD  Interventional Pain and Spine  Physical Medicine and Rehabilitation  [facility]      CPT  Intraarticular joint or medial branch block (MBB) - lumbar or sacral (1st level):  [id number]  Intraarticular joint or medial branch block (MBB) - lumbar or sacral (2nd level):  [id number]

## 2025-03-27 NOTE — OR SURGEON
Immediate Post OP Note    Pre-Op Diagnosis Codes:      * Lumbar spondylosis [M47.816]      Post-Op Diagnosis Codes:     * Lumbar spondylosis [M47.816]      Procedure(s):  Diagnostic medial branch blocks targeting the BILATERAL L4-5 and L5-S1 facet joints with fluoroscopic guidance #2 - Wound Class: Clean    Surgeon(s):  Doris Nevarez M.D.    Anesthesiologist/Type of Anesthesia:  No anesthesia staff entered./Local    Surgical Staff:  Circulator: Ana Whitman R.N.  Scrub Person: Beckie Roach R.N.  Radiology Technologist: Deven Casas    Specimens removed if any:  * No specimens in log *    Estimated Blood Loss: None    Findings: None    Complications: None        3/27/2025 10:37 AM Doris Nevarez M.D.

## 2025-04-01 ENCOUNTER — APPOINTMENT (OUTPATIENT)
Dept: PHYSICAL MEDICINE AND REHAB | Facility: MEDICAL CENTER | Age: 50
End: 2025-04-01
Payer: COMMERCIAL

## 2025-04-01 VITALS
HEIGHT: 63 IN | BODY MASS INDEX: 25 KG/M2 | OXYGEN SATURATION: 100 % | TEMPERATURE: 98.4 F | WEIGHT: 141.09 LBS | SYSTOLIC BLOOD PRESSURE: 111 MMHG | HEART RATE: 82 BPM | DIASTOLIC BLOOD PRESSURE: 78 MMHG

## 2025-04-01 DIAGNOSIS — G89.29 CHRONIC LEFT-SIDED THORACIC BACK PAIN: ICD-10-CM

## 2025-04-01 DIAGNOSIS — M54.6 CHRONIC LEFT-SIDED THORACIC BACK PAIN: ICD-10-CM

## 2025-04-01 DIAGNOSIS — M54.16 LEFT LUMBAR RADICULITIS: ICD-10-CM

## 2025-04-01 DIAGNOSIS — G89.29 OTHER CHRONIC PAIN: ICD-10-CM

## 2025-04-01 DIAGNOSIS — M51.369 ANNULAR TEAR OF LUMBAR DISC: ICD-10-CM

## 2025-04-01 DIAGNOSIS — M48.061 LUMBAR STENOSIS WITHOUT NEUROGENIC CLAUDICATION: ICD-10-CM

## 2025-04-01 DIAGNOSIS — M79.2 NEURALGIA: ICD-10-CM

## 2025-04-01 DIAGNOSIS — M47.819 FACET ARTHROPATHY: ICD-10-CM

## 2025-04-01 DIAGNOSIS — M54.50 CHRONIC BILATERAL LOW BACK PAIN WITHOUT SCIATICA: ICD-10-CM

## 2025-04-01 DIAGNOSIS — G89.29 CHRONIC BILATERAL LOW BACK PAIN WITHOUT SCIATICA: ICD-10-CM

## 2025-04-01 DIAGNOSIS — M47.816 LUMBAR SPONDYLOSIS: ICD-10-CM

## 2025-04-01 PROCEDURE — 3074F SYST BP LT 130 MM HG: CPT | Performed by: STUDENT IN AN ORGANIZED HEALTH CARE EDUCATION/TRAINING PROGRAM

## 2025-04-01 PROCEDURE — 3078F DIAST BP <80 MM HG: CPT | Performed by: STUDENT IN AN ORGANIZED HEALTH CARE EDUCATION/TRAINING PROGRAM

## 2025-04-01 PROCEDURE — 1125F AMNT PAIN NOTED PAIN PRSNT: CPT | Performed by: STUDENT IN AN ORGANIZED HEALTH CARE EDUCATION/TRAINING PROGRAM

## 2025-04-01 PROCEDURE — 99214 OFFICE O/P EST MOD 30 MIN: CPT | Performed by: STUDENT IN AN ORGANIZED HEALTH CARE EDUCATION/TRAINING PROGRAM

## 2025-04-01 RX ORDER — PREGABALIN 50 MG/1
CAPSULE ORAL
Qty: 53 CAPSULE | Refills: 0 | Status: SHIPPED | OUTPATIENT
Start: 2025-04-01 | End: 2025-04-25 | Stop reason: SDUPTHER

## 2025-04-01 ASSESSMENT — PATIENT HEALTH QUESTIONNAIRE - PHQ9
SUM OF ALL RESPONSES TO PHQ QUESTIONS 1-9: 5
5. POOR APPETITE OR OVEREATING: 0 - NOT AT ALL
CLINICAL INTERPRETATION OF PHQ2 SCORE: 2

## 2025-04-01 ASSESSMENT — PAIN SCALES - GENERAL: PAINLEVEL_OUTOF10: 7=MODERATE-SEVERE PAIN

## 2025-04-01 ASSESSMENT — FIBROSIS 4 INDEX: FIB4 SCORE: 0.94

## 2025-04-01 NOTE — H&P (VIEW-ONLY)
Verbal consent was acquired by the patient to use [employer] ambient listening note generation during this visit Yes     Follow-up patient Note    Interventional Pain and Spine  Physiatry (Physical Medicine and Rehabilitation)     Patient Name: Evelyn Mullins  : 1975  Date of service: 2025    Chief Complaint:   Chief Complaint   Patient presents with    Follow-Up     Post SP       HISTORY FROM INITIAL VISIT (2025):     History of Present Illness  Evelyn Mullins is a 50-year-old female who presents for a new patient visit.     She reports experiencing back pain, which began in early  as left-sided discomfort, initially attributed to her piriformis muscle. The pain radiated down her leg, prompting her to seek physical therapy. Despite several months of therapy, her condition did not improve, leading her to consult with Dr. Maciel. A pelvic and lumbar MRI was performed, but no specific diagnosis was made. An injection was suggested, but she declined. Instead, she opted for acupuncture, which significantly alleviated her symptoms after three sessions. However, in , she began experiencing generalized back pain. In 2024, she sustained a left Achilles injury, which was managed by her physical therapist. As her Achilles injury improved, her lower back pain worsened, particularly with rotational movements. Her physical therapist identified instabilities in her lower lumbar facet joints or SI joint, and they have been focusing on core stability without significant progress.       Her movement patterns have been distorted due to her back issues. She reports no radiating pain down her right leg but feels discomfort in her hip. She has not been able to correlate her pain with any specific activities. She finds it difficult to rise from a chair and experiences pain upon waking up in the morning. Prolonged sitting followed by standing exacerbates her pain, but walking provides some relief.  She occasionally experiences tingling in her left leg, but it is less frequent and intense than when it first started in 2023. She reports no weakness, numbness, or tingling in her legs. She has tried acupuncture again this fall, focusing on her back, but it did not help.      She has been off tamoxifen for 6 weeks since 2024, during which she experienced a brief period of relief. She resumed tamoxifen at a lower dose in 2025 but discontinued it a week ago. Her oncologist suggested a possible link between tamoxifen and her symptoms, however she denies significant pain all over with resuming tamoxifen.     She reports constant discomfort at her left torso and upper extremity after surgery and radiation for breast cancer, which is present even at rest.     Her physical therapist at [facility] has been dry needling her lower lumbar and SI joint. She finds that consciously engaging her abs and standing up reduces her pain. She takes ibuprofen 200 mg as needed for pain relief. She has not tried gabapentin or duloxetine for her radiation-induced pain. She is considering Cymbalta as suggested by her primary care physician. She is not interested in taking Effexor due to possible side effects. She has a new MRI scheduled at [facility]. She reports no pregnancy, diabetes, pacemaker or wiring, or claustrophobia.     MEDICATIONS  Current: tamoxifen, ibuprofen    HPI  Today's visit   Evelyn Mullins ( 1975) is a female with Diagnoses of Lumbar spondylosis, Annular tear of L5-S1 disc, Chronic bilateral low back pain without sciatica, Lumbar stenosis without neurogenic claudication, Facet arthropathy, Left lumbar radiculitis, Other chronic pain, Chronic left-sided thoracic back pain, and Neuralgia were pertinent to this visit.    History of Present Illness    The patient is a 50-year-old female who presents for a follow-up.    She reports a prolonged duration of at least 80% pain  relief following the second injection. She experienced significant improvement in mobility, including ease in rising from a seated position and general ambulation. She expresses interest in proceeding with ablation therapy. She also reports discomfort on both sides of her back, with a more pronounced pinching sensation radiating down her left leg during rotation. This discomfort extends down to her calf. She does not experience any numbness. She has been engaging in physical therapy exercises involving straps, pointing, and flexing to maintain hamstring flexibility.     She discontinued gabapentin due to gastrointestinal side effects, including constipation and gas, which occurred approximately 10 minutes after administration. She also reported feeling disoriented and confused, particularly in the mornings, which made driving challenging.  She gradually reduced gabapentin by approximately 300 mg/day.  She last took it on Saturday.      Pain severity 7/10 currently  Pt denies new numbness, tingling, or weakness.    Procedure history:  - 3/5/25 diagnostic lumbar medial branch blocks targeting the bilateral L4-L5 and L5-S1 facet joints -over 80% relief of index pain  - 3/27/25 diagnostic lumbar medial branch blocks targeting the bilateral L4-L5 and L5-S1 facet joints -over 80% relief of index pain    ROS:   Red Flags ROS:   Fever, Chills, Sweats: Denies  Involuntary Weight Loss: Denies  Bladder Incontinence: Denies  Bowel Incontinence: denies  Saddle Anesthesia: Denies    All other systems reviewed and negative.     PMHx:   Past Medical History:   Diagnosis Date    Bronchitis     Cancer (HCC) 05/30/23    High cholesterol     Technically high but not being treated    Malignant neoplasm of upper-outer quadrant of left breast in female, estrogen receptor positive (HCC) 6/20/2023    Urinary incontinence     Sneezing, jumping       PSHx:   Past Surgical History:   Procedure Laterality Date    LUMBAR MEDIAL BRANCH BLOCKS  Bilateral 3/27/2025    Procedure: Diagnostic medial branch blocks targeting the BILATERAL L4-5 and L5-S1 facet joints with fluoroscopic guidance #2;  Surgeon: Doris Nevarez M.D.;  [facility];  Service: Pain Management    LUMBAR MEDIAL BRANCH BLOCKS Bilateral 3/5/2025    Procedure: Diagnostic medial branch blocks targeting the BILATERAL L4-5 and L5-S1 facet joints with fluoroscopic guidance #1;  Surgeon: Doris Nevarez M.D.;  Location: [facility];  Service: Pain Management    PB MASTECTOMY, PARTIAL Left 6/27/2023    Procedure: WIRE LOCALIZED LEFT PARTIAL MASTECTOMY, MASTOPEXY, LEFT SENTINEL LYMPH INJECTION WITH EXCISION OF AXILLARY NODES;  Surgeon: Antionette Au M.D.;  Location: SURGERY [facility] DAY [facility];  Service: General    SC SUSPENSION OF BREAST Left 6/27/2023    Procedure: MASTOPEXY;  Surgeon: Antionette Au M.D.;  Location: SURGERY SAME DAY [facility];  Service: General    NODE BIOPSY SENTINEL Left 6/27/2023    Procedure: BIOPSY, LYMPH NODE, SENTINEL;  Surgeon: Antionette Au M.D.;  Location: SURGERY SAME DAY [facility];  Service: General    NO PERTINENT PAST SURGICAL HISTORY         Family Hx:   History reviewed. No pertinent family history.    Social Hx:  Social History     Socioeconomic History    Marital status: Single     Spouse name: Not on file    Number of children: Not on file    Years of education: Not on file    Highest education level: Not on file   Occupational History    Not on file   Tobacco Use    Smoking status: Never     Passive exposure: Never    Smokeless tobacco: Never   Vaping Use    Vaping status: Never Used   Substance and Sexual Activity    Alcohol use: Not Currently    Drug use: Not Currently     Types: Inhaled     Comment: Tried marijuana a few times    Sexual activity: Not on file   Other Topics Concern    Not on file   Social History Narrative    Not on file     Social Drivers of Health     Financial Resource Strain: Not on file    Food Insecurity: Not on file   Transportation Needs: Not on file   Physical Activity: Not on file   Stress: Not on file   Social Connections: Not on file   Intimate Partner Violence: Not on file   Housing Stability: Not on file       Allergies:  Allergies   Allergen Reactions    Pcn [Penicillins] Anaphylaxis    Shellfish Allergy Swelling       Medications: reviewed on epic.   Outpatient Medications Marked as Taking for the 4/1/25 encounter (Office Visit) with Doris Nevarez M.D.   Medication Sig Dispense Refill    pregabalin (LYRICA) 50 MG capsule Take 1 Capsule by mouth at bedtime for 7 days, THEN 2 Capsules at bedtime for 23 days. 53 Capsule 0    levothyroxine (SYNTHROID) 200 MCG Tab       tamoxifen (NOLVADEX) 10 MG Tab Take 0.5 Tablets by mouth every day. 90 Tablet 3    Omega-3 Fatty Acids (FISH OIL) 1000 MG Cap capsule Take 1,000 mg by mouth 3 times a day with meals.      EPSOLAY 5 % cream       Melatonin 1 MG/4ML Liquid       Probiotic Product (PROBIOTIC + TURMERIC EXTRACT) 400 MG Cap       glutamine 500 mg/mL oral suspension       fluticasone (FLONASE) 50 MCG/ACT nasal spray Administer 1 Spray into affected nostril(S) every day.      VITAMIN D PO Take 2,000 Int'l Units by mouth every day.      MAGNESIUM PO Take 400 mg by mouth every day.      multivitamin Tab Take 1 Tablet by mouth every day.      Cetirizine HCl (ZYRTEC ALLERGY PO) Take 10 mg by mouth every day.          Current Outpatient Medications on File Prior to Visit   Medication Sig Dispense Refill    levothyroxine (SYNTHROID) 200 MCG Tab       tamoxifen (NOLVADEX) 10 MG Tab Take 0.5 Tablets by mouth every day. 90 Tablet 3    Omega-3 Fatty Acids (FISH OIL) 1000 MG Cap capsule Take 1,000 mg by mouth 3 times a day with meals.      EPSOLAY 5 % cream       Melatonin 1 MG/4ML Liquid       Probiotic Product (PROBIOTIC + TURMERIC EXTRACT) 400 MG Cap       glutamine 500 mg/mL oral suspension       fluticasone (FLONASE) 50 MCG/ACT nasal spray Administer 1  "Spray into affected nostril(S) every day.      VITAMIN D PO Take 2,000 Int'l Units by mouth every day.      MAGNESIUM PO Take 400 mg by mouth every day.      multivitamin Tab Take 1 Tablet by mouth every day.      Cetirizine HCl (ZYRTEC ALLERGY PO) Take 10 mg by mouth every day.       No current facility-administered medications on file prior to visit.         EXAMINATION     Physical Exam:   /78 (BP Location: Right arm, Patient Position: Sitting, BP Cuff Size: Adult)   Pulse 82   Temp 36.9 °C (98.4 °F) (Temporal)   Ht 1.6 m (5' 3\")   Wt 64 kg (141 lb 1.5 oz)   SpO2 100%     Constitutional:   Body Habitus: Body mass index is 24.99 kg/m².  Cooperation: Fully cooperates with exam  Appearance: Well-groomed, well-nourished.    Eyes: No scleral icterus to suggest severe liver disease, no proptosis to suggest severe hyperthyroidism    ENT -no obvious auditory deficits, no noticeable facial droop     Skin -no rashes or lesions noted     Respiratory-  breathing comfortably on room air, no audible wheezing    Cardiovascular-distal extremities warm and well perfused.  No lower extremity edema is noted.     Gastrointestinal - no obvious abdominal masses, non-distended    Psychiatric- alert and oriented ×3. Normal affect.     Gait - normal gait, no use of ambulatory device, nonantalgic.     Musculoskeletal and Neuro -      Thoracic/Lumbar Spine/Sacral Spine/Hips   Inspection: No evidence of atrophy in bilateral lower extremities throughout      There is limited active range of motion with lumbar extension, possibly secondary to pain     Facet loading maneuver positive bilaterally     Palpation:   Tenderness to palpation over the paraspinal muscles bilaterally, lumbar facets bilaterally     Lumbar spine /hip provocative exam maneuvers  Slump test negative bilaterally      Key points for the international standards for neurological classification of spinal cord injury (ISNCSCI) to light touch.   Dermatome R L   L2 2 2 " "  L3 2 2   L4 2 2   L5 2 2   S1 2 2   S2 2 2         Motor Exam Lower Extremities  ? Myotome R L   Hip flexion L2 5 5   Knee extension L3 5 5   Ankle dorsiflexion L4 5 5   Toe extension L5 5 5   Ankle plantarflexion S1 5 5       previous exam  Reflexes  ?   R L   Patella   2+ 2+   Achilles    2+ 2+      Clonus of the ankle negative bilaterally          MEDICAL DECISION MAKING    Medical records review: see under HPI section.     DATA    Labs: No new labs available for review since last visit.   Lab Results   Component Value Date/Time    SODIUM 141 11/27/2024 05:22 AM    SODIUM 136 06/20/2023 03:40 PM    POTASSIUM 4.4 11/27/2024 05:22 AM    POTASSIUM 3.9 06/20/2023 03:40 PM    CHLORIDE 109 (H) 11/27/2024 05:22 AM    CHLORIDE 102 06/20/2023 03:40 PM    CO2 20 11/27/2024 05:22 AM    CO2 25 06/20/2023 03:40 PM    ANION 9.0 06/20/2023 03:40 PM    GLUCOSE 86 11/27/2024 05:22 AM    GLUCOSE 83 06/20/2023 03:40 PM    BUN 22 11/27/2024 05:22 AM    BUN 18 06/20/2023 03:40 PM    CREATININE 0.99 11/27/2024 05:22 AM    CREATININE 0.96 06/20/2023 03:40 PM    CALCIUM 9.4 11/27/2024 05:22 AM    CALCIUM 9.7 06/20/2023 03:40 PM    ASTSGOT 22 11/27/2024 05:22 AM    ASTSGOT 20 11/22/2014 08:09 AM    ALTSGPT 15 11/27/2024 05:22 AM    ALTSGPT 15 11/22/2014 08:09 AM    TBILIRUBIN 0.3 11/27/2024 05:22 AM    TBILIRUBIN 0.4 11/22/2014 08:09 AM    ALBUMIN 4.2 11/27/2024 05:22 AM    ALBUMIN 4.3 11/22/2014 08:09 AM    TOTPROTEIN 6.5 11/27/2024 05:22 AM    TOTPROTEIN 7.2 11/22/2014 08:09 AM    GLOBULIN 2.3 11/27/2024 05:22 AM    GLOBULIN 2.9 11/22/2014 08:09 AM    AGRATIO 1.5 11/22/2014 08:09 AM       No results found for: \"PROTHROMBTM\", \"INR\"     Lab Results   Component Value Date/Time    WBC 5.4 12/24/2024 04:15 AM    WBC 9.5 06/20/2023 03:40 PM    RBC 4.19 12/24/2024 04:15 AM    RBC 3.80 (L) 06/20/2023 03:40 PM    HEMOGLOBIN 13.6 12/24/2024 04:15 AM    HEMOGLOBIN 12.8 06/20/2023 03:40 PM    HEMATOCRIT 41.3 12/24/2024 04:15 AM    HEMATOCRIT " "38.4 06/20/2023 03:40 PM    MCV 99 (H) 12/24/2024 04:15 AM    .1 (H) 06/20/2023 03:40 PM    MCH 32.5 12/24/2024 04:15 AM    MCH 33.7 (H) 06/20/2023 03:40 PM    MCHC 32.9 12/24/2024 04:15 AM    MCHC 33.3 06/20/2023 03:40 PM    MPV 10.3 06/20/2023 03:40 PM    NEUTSPOLYS 39 12/24/2024 04:15 AM    NEUTSPOLYS 65.30 06/20/2023 03:40 PM    LYMPHOCYTES 45 12/24/2024 04:15 AM    LYMPHOCYTES 25.40 06/20/2023 03:40 PM    MONOCYTES 7 12/24/2024 04:15 AM    MONOCYTES 5.20 06/20/2023 03:40 PM    EOSINOPHILS 8 12/24/2024 04:15 AM    EOSINOPHILS 3.40 06/20/2023 03:40 PM    BASOPHILS 1 12/24/2024 04:15 AM    BASOPHILS 0.50 06/20/2023 03:40 PM        No results found for: \"HBA1C\"     Imaging:   I personally reviewed following images, these are my reads  MRI lumbar spine 10/19/2023  Annular tear at L5-S1.  Moderate central canal stenosis at L4-5.  Mild central canal stenosis at L3-4.  At L2-3 and L3-4 and L4-5 and L5-S1 there is mild bilateral neuroforaminal stenosis.  Facet joint synovitis at bilateral L4-5.  Facet arthropathy at bilateral L5-S1. See formal radiology report for further details.     MRI lumbar spine 10/19/2023  No evidence of sacroiliitis.    MRI lumbar spine 2/26/2025  At L4-5 there is moderate central canal stenosis and mild bilateral neuroforaminal stenosis worse on the right.  At L5-S1 there is mild bilateral neuroforaminal stenosis.  Annular tear at L5-S1.  Facet arthropathy at multiple levels most severe at bilateral L4-5. See formal radiology report for further details.      IMAGING radiology reads. I reviewed the following radiology reads                      Results for orders placed during the hospital encounter of 10/19/23    MR-LUMBAR SPINE-W/O    Impression  1.  Multilevel degenerative disc disease and facet degeneration. There is moderate central canal narrowing at L4-5, mild central canal narrowing at L3-4 and borderline central canal narrowing at L2-3.    2.  Mild multilevel neural foraminal " narrowing as specifically described above.    3.  Mild multilevel degenerative subluxation.        Results for orders placed during the hospital encounter of 10/19/23    MR-LUMBAR SPINE-W/O    Impression  1.  Multilevel degenerative disc disease and facet degeneration. There is moderate central canal narrowing at L4-5, mild central canal narrowing at L3-4 and borderline central canal narrowing at L2-3.    2.  Mild multilevel neural foraminal narrowing as specifically described above.    3.  Mild multilevel degenerative subluxation.               Results for orders placed during the hospital encounter of 10/19/23    MR-PELVIS W/O    Impression  1.  No evidence of marrow edema or arthropathy of the hip articulations.    2.  No evidence of sacroiliitis.    3.  No evidence of muscle or tendon injury.    4.  Osteitis symphysis pubis.                              Results for orders placed in visit on 21    DX-CLAVICLE LEFT                                            Diagnosis  Visit Diagnoses     ICD-10-CM   1. Lumbar spondylosis  M47.816   2. Annular tear of L5-S1 disc  M51.369   3. Chronic bilateral low back pain without sciatica  M54.50    G89.29   4. Lumbar stenosis without neurogenic claudication  M48.061   5. Facet arthropathy  M47.819   6. Left lumbar radiculitis  M54.16   7. Other chronic pain  G89.29   8. Chronic left-sided thoracic back pain  M54.6    G89.29   9. Neuralgia  M79.2           ASSESSMENT AND PLAN:  Evelyn Mullins (: 1975) is a female with bilateral low back pain that appears to be facetogenic bilaterally, reproduced with positive facet loading maneuver and with facet arthropathy seen on MRI lumbar spine corresponding to her areas of pain.  Also with episodic low back pain that appears possibly secondary to L5-S1 annular tear.     Also with left sided thoracic/UE pain after radiation for left breast cancer     Boogie was seen today for follow-up.    Diagnoses and all orders for this  visit:    Lumbar spondylosis  -     Referral to Pain Clinic    Annular tear of L5-S1 disc    Chronic bilateral low back pain without sciatica    Lumbar stenosis without neurogenic claudication    Facet arthropathy    Left lumbar radiculitis    Other chronic pain  -     pregabalin (LYRICA) 50 MG capsule; Take 1 Capsule by mouth at bedtime for 7 days, THEN 2 Capsules at bedtime for 23 days.    Chronic left-sided thoracic back pain  -     pregabalin (LYRICA) 50 MG capsule; Take 1 Capsule by mouth at bedtime for 7 days, THEN 2 Capsules at bedtime for 23 days.    Neuralgia  -     pregabalin (LYRICA) 50 MG capsule; Take 1 Capsule by mouth at bedtime for 7 days, THEN 2 Capsules at bedtime for 23 days.            PLAN  Physical Therapy: cont PT. At this time the patient continues to have severe low back pain despite completing over 3 months of PT     Diagnostic workup: Personally reviewed at today's visit:   Fluoroscopic images from 3/27/25 indicating successful diagnostic lumbar medial branch blocks targeting the bilateral L4-L5 and L5-S1 facet joints    Medications:   - OK to continue ibuprofen 200mg PRN which significantly helps. Patient would like to minimize medication intake.   -agree with discontinuation of gabapentin given GI side effects with bloating and constipation and cognitive side effects. Had pain relief x 1 weekwith 1200mg QHS.  It had previously improved her postradiation pain of the left torso and upper extremity  -Prescribe trial of Lyrica as above  - I have discussed trial of Cymbalta versus Effexor, she would like to defer these at this time due to side effects that could occur with weaning this medication     Interventions:   - discussed radiofrequency ablation of medial branch nerves targeting Bilateral L4-L5 and L5-S1 facet joints with sedation given over 80% pain relief from diagnostic lumbar medial branch blocks #1 and #2 targeting Bilateral L4-L5 and L5-S1 facet joints. The risks, benefits, and  alternatives to this procedure were discussed and the patient wishes to proceed with the procedure. Risks include but are not limited to damage to surrounding structures, infection, bleeding, worsening of pain which can be permanent, and weakness which can be permanent. Benefits include pain relief and improved function. Alternatives include not doing the procedure.    - discussed possible left sided epidural steroid injection if radicular pain worsens.  Currently it is tolerable.  - discussed consideration of peripheral nerve stimulator for left thoracic pain if medications are inadequate to control her pain and if the severity of her pain warrants     Other  - has done acupuncture with significant relief of left piriformis pain but no significant relief of low back pain most recently   - of note patient was on tamoxifen for 1 year. She went off of tamoxifen for 6 weeks starting in Nov, and during this time she had significant improvement in back pain for 2 weeks but unfortunately the pain worsened since. She notes hx of left foot pain that was thought to be an achilles injury but denies significant pain in all of her joints.  I have discussed that I would expect for tamoxifen to increase her pain more diffusely then only in her low back.  I shared this information with her oncology team    Follow-up: 4 weeks after lumbar radiofrequency ablation    Orders Placed This Encounter    Referral to Pain Clinic    pregabalin (LYRICA) 50 MG capsule       Doris Nevarez MD  Interventional Pain and Spine  Physical Medicine and Rehabilitation  [facility]      The above note documents my personal evaluation of this patient. In addition, I have reviewed and confirmed with the patient and MA the supportive information documented in today's Patient Health Questionnaire and Office Note.     Please note that this dictation was created using voice recognition software. I have made every reasonable attempt to correct obvious  errors, but I expect that there are errors of grammar and possibly content that I did not discover before finalizing the note.

## 2025-04-01 NOTE — PROGRESS NOTES
Verbal consent was acquired by the patient to use [employer] ambient listening note generation during this visit Yes     Follow-up patient Note    Interventional Pain and Spine  Physiatry (Physical Medicine and Rehabilitation)     Patient Name: Evelyn Mullins  : 1975  Date of service: 2025    Chief Complaint:   Chief Complaint   Patient presents with    Follow-Up     Post SP       HISTORY FROM INITIAL VISIT (2025):     History of Present Illness  Evelyn Mullins is a 50-year-old female who presents for a new patient visit.     She reports experiencing back pain, which began in early  as left-sided discomfort, initially attributed to her piriformis muscle. The pain radiated down her leg, prompting her to seek physical therapy. Despite several months of therapy, her condition did not improve, leading her to consult with Dr. Maciel. A pelvic and lumbar MRI was performed, but no specific diagnosis was made. An injection was suggested, but she declined. Instead, she opted for acupuncture, which significantly alleviated her symptoms after three sessions. However, in , she began experiencing generalized back pain. In 2024, she sustained a left Achilles injury, which was managed by her physical therapist. As her Achilles injury improved, her lower back pain worsened, particularly with rotational movements. Her physical therapist identified instabilities in her lower lumbar facet joints or SI joint, and they have been focusing on core stability without significant progress.       Her movement patterns have been distorted due to her back issues. She reports no radiating pain down her right leg but feels discomfort in her hip. She has not been able to correlate her pain with any specific activities. She finds it difficult to rise from a chair and experiences pain upon waking up in the morning. Prolonged sitting followed by standing exacerbates her pain, but walking provides some relief.  She occasionally experiences tingling in her left leg, but it is less frequent and intense than when it first started in 2023. She reports no weakness, numbness, or tingling in her legs. She has tried acupuncture again this fall, focusing on her back, but it did not help.      She has been off tamoxifen for 6 weeks since 2024, during which she experienced a brief period of relief. She resumed tamoxifen at a lower dose in 2025 but discontinued it a week ago. Her oncologist suggested a possible link between tamoxifen and her symptoms, however she denies significant pain all over with resuming tamoxifen.     She reports constant discomfort at her left torso and upper extremity after surgery and radiation for breast cancer, which is present even at rest.     Her physical therapist at [facility] has been dry needling her lower lumbar and SI joint. She finds that consciously engaging her abs and standing up reduces her pain. She takes ibuprofen 200 mg as needed for pain relief. She has not tried gabapentin or duloxetine for her radiation-induced pain. She is considering Cymbalta as suggested by her primary care physician. She is not interested in taking Effexor due to possible side effects. She has a new MRI scheduled at [facility]. She reports no pregnancy, diabetes, pacemaker or wiring, or claustrophobia.     MEDICATIONS  Current: tamoxifen, ibuprofen    HPI  Today's visit   Evelyn Mullins ( 1975) is a female with Diagnoses of Lumbar spondylosis, Annular tear of L5-S1 disc, Chronic bilateral low back pain without sciatica, Lumbar stenosis without neurogenic claudication, Facet arthropathy, Left lumbar radiculitis, Other chronic pain, Chronic left-sided thoracic back pain, and Neuralgia were pertinent to this visit.    History of Present Illness    The patient is a 50-year-old female who presents for a follow-up.    She reports a prolonged duration of at least 80% pain  relief following the second injection. She experienced significant improvement in mobility, including ease in rising from a seated position and general ambulation. She expresses interest in proceeding with ablation therapy. She also reports discomfort on both sides of her back, with a more pronounced pinching sensation radiating down her left leg during rotation. This discomfort extends down to her calf. She does not experience any numbness. She has been engaging in physical therapy exercises involving straps, pointing, and flexing to maintain hamstring flexibility.     She discontinued gabapentin due to gastrointestinal side effects, including constipation and gas, which occurred approximately 10 minutes after administration. She also reported feeling disoriented and confused, particularly in the mornings, which made driving challenging.  She gradually reduced gabapentin by approximately 300 mg/day.  She last took it on Saturday.      Pain severity 7/10 currently  Pt denies new numbness, tingling, or weakness.    Procedure history:  - 3/5/25 diagnostic lumbar medial branch blocks targeting the bilateral L4-L5 and L5-S1 facet joints -over 80% relief of index pain  - 3/27/25 diagnostic lumbar medial branch blocks targeting the bilateral L4-L5 and L5-S1 facet joints -over 80% relief of index pain    ROS:   Red Flags ROS:   Fever, Chills, Sweats: Denies  Involuntary Weight Loss: Denies  Bladder Incontinence: Denies  Bowel Incontinence: denies  Saddle Anesthesia: Denies    All other systems reviewed and negative.     PMHx:   Past Medical History:   Diagnosis Date    Bronchitis     Cancer (HCC) 05/30/23    High cholesterol     Technically high but not being treated    Malignant neoplasm of upper-outer quadrant of left breast in female, estrogen receptor positive (HCC) 6/20/2023    Urinary incontinence     Sneezing, jumping       PSHx:   Past Surgical History:   Procedure Laterality Date    LUMBAR MEDIAL BRANCH BLOCKS  Bilateral 3/27/2025    Procedure: Diagnostic medial branch blocks targeting the BILATERAL L4-5 and L5-S1 facet joints with fluoroscopic guidance #2;  Surgeon: Doris Nevarez M.D.;  [facility];  Service: Pain Management    LUMBAR MEDIAL BRANCH BLOCKS Bilateral 3/5/2025    Procedure: Diagnostic medial branch blocks targeting the BILATERAL L4-5 and L5-S1 facet joints with fluoroscopic guidance #1;  Surgeon: Doris Nevarez M.D.;  Location: [facility];  Service: Pain Management    PB MASTECTOMY, PARTIAL Left 6/27/2023    Procedure: WIRE LOCALIZED LEFT PARTIAL MASTECTOMY, MASTOPEXY, LEFT SENTINEL LYMPH INJECTION WITH EXCISION OF AXILLARY NODES;  Surgeon: Antionette Au M.D.;  Location: SURGERY SAME DAY [facility];  Service: General    RI SUSPENSION OF BREAST Left 6/27/2023    Procedure: MASTOPEXY;  Surgeon: Antionette Au M.D.;  Location: SURGERY SAME DAY [facility];  Service: General    NODE BIOPSY SENTINEL Left 6/27/2023    Procedure: BIOPSY, LYMPH NODE, SENTINEL;  Surgeon: Antionette Au M.D.;  Location: SURGERY SAME DAY [facility];  Service: General    NO PERTINENT PAST SURGICAL HISTORY         Family Hx:   History reviewed. No pertinent family history.    Social Hx:  Social History     Socioeconomic History    Marital status: Single     Spouse name: Not on file    Number of children: Not on file    Years of education: Not on file    Highest education level: Not on file   Occupational History    Not on file   Tobacco Use    Smoking status: Never     Passive exposure: Never    Smokeless tobacco: Never   Vaping Use    Vaping status: Never Used   Substance and Sexual Activity    Alcohol use: Not Currently    Drug use: Not Currently     Types: Inhaled     Comment: Tried marijuana a few times    Sexual activity: Not on file   Other Topics Concern    Not on file   Social History Narrative    Not on file     Social Drivers of Health     Financial Resource Strain: Not on file    Food Insecurity: Not on file   Transportation Needs: Not on file   Physical Activity: Not on file   Stress: Not on file   Social Connections: Not on file   Intimate Partner Violence: Not on file   Housing Stability: Not on file       Allergies:  Allergies   Allergen Reactions    Pcn [Penicillins] Anaphylaxis    Shellfish Allergy Swelling       Medications: reviewed on epic.   Outpatient Medications Marked as Taking for the 4/1/25 encounter (Office Visit) with Doris Nevarez M.D.   Medication Sig Dispense Refill    pregabalin (LYRICA) 50 MG capsule Take 1 Capsule by mouth at bedtime for 7 days, THEN 2 Capsules at bedtime for 23 days. 53 Capsule 0    levothyroxine (SYNTHROID) 200 MCG Tab       tamoxifen (NOLVADEX) 10 MG Tab Take 0.5 Tablets by mouth every day. 90 Tablet 3    Omega-3 Fatty Acids (FISH OIL) 1000 MG Cap capsule Take 1,000 mg by mouth 3 times a day with meals.      EPSOLAY 5 % cream       Melatonin 1 MG/4ML Liquid       Probiotic Product (PROBIOTIC + TURMERIC EXTRACT) 400 MG Cap       glutamine 500 mg/mL oral suspension       fluticasone (FLONASE) 50 MCG/ACT nasal spray Administer 1 Spray into affected nostril(S) every day.      VITAMIN D PO Take 2,000 Int'l Units by mouth every day.      MAGNESIUM PO Take 400 mg by mouth every day.      multivitamin Tab Take 1 Tablet by mouth every day.      Cetirizine HCl (ZYRTEC ALLERGY PO) Take 10 mg by mouth every day.          Current Outpatient Medications on File Prior to Visit   Medication Sig Dispense Refill    levothyroxine (SYNTHROID) 200 MCG Tab       tamoxifen (NOLVADEX) 10 MG Tab Take 0.5 Tablets by mouth every day. 90 Tablet 3    Omega-3 Fatty Acids (FISH OIL) 1000 MG Cap capsule Take 1,000 mg by mouth 3 times a day with meals.      EPSOLAY 5 % cream       Melatonin 1 MG/4ML Liquid       Probiotic Product (PROBIOTIC + TURMERIC EXTRACT) 400 MG Cap       glutamine 500 mg/mL oral suspension       fluticasone (FLONASE) 50 MCG/ACT nasal spray Administer 1  "Spray into affected nostril(S) every day.      VITAMIN D PO Take 2,000 Int'l Units by mouth every day.      MAGNESIUM PO Take 400 mg by mouth every day.      multivitamin Tab Take 1 Tablet by mouth every day.      Cetirizine HCl (ZYRTEC ALLERGY PO) Take 10 mg by mouth every day.       No current facility-administered medications on file prior to visit.         EXAMINATION     Physical Exam:   /78 (BP Location: Right arm, Patient Position: Sitting, BP Cuff Size: Adult)   Pulse 82   Temp 36.9 °C (98.4 °F) (Temporal)   Ht 1.6 m (5' 3\")   Wt 64 kg (141 lb 1.5 oz)   SpO2 100%     Constitutional:   Body Habitus: Body mass index is 24.99 kg/m².  Cooperation: Fully cooperates with exam  Appearance: Well-groomed, well-nourished.    Eyes: No scleral icterus to suggest severe liver disease, no proptosis to suggest severe hyperthyroidism    ENT -no obvious auditory deficits, no noticeable facial droop     Skin -no rashes or lesions noted     Respiratory-  breathing comfortably on room air, no audible wheezing    Cardiovascular-distal extremities warm and well perfused.  No lower extremity edema is noted.     Gastrointestinal - no obvious abdominal masses, non-distended    Psychiatric- alert and oriented ×3. Normal affect.     Gait - normal gait, no use of ambulatory device, nonantalgic.     Musculoskeletal and Neuro -      Thoracic/Lumbar Spine/Sacral Spine/Hips   Inspection: No evidence of atrophy in bilateral lower extremities throughout      There is limited active range of motion with lumbar extension, possibly secondary to pain     Facet loading maneuver positive bilaterally     Palpation:   Tenderness to palpation over the paraspinal muscles bilaterally, lumbar facets bilaterally     Lumbar spine /hip provocative exam maneuvers  Slump test negative bilaterally      Key points for the international standards for neurological classification of spinal cord injury (ISNCSCI) to light touch.   Dermatome R L   L2 2 2 " "  L3 2 2   L4 2 2   L5 2 2   S1 2 2   S2 2 2         Motor Exam Lower Extremities  ? Myotome R L   Hip flexion L2 5 5   Knee extension L3 5 5   Ankle dorsiflexion L4 5 5   Toe extension L5 5 5   Ankle plantarflexion S1 5 5       previous exam  Reflexes  ?   R L   Patella   2+ 2+   Achilles    2+ 2+      Clonus of the ankle negative bilaterally          MEDICAL DECISION MAKING    Medical records review: see under HPI section.     DATA    Labs: No new labs available for review since last visit.   Lab Results   Component Value Date/Time    SODIUM 141 11/27/2024 05:22 AM    SODIUM 136 06/20/2023 03:40 PM    POTASSIUM 4.4 11/27/2024 05:22 AM    POTASSIUM 3.9 06/20/2023 03:40 PM    CHLORIDE 109 (H) 11/27/2024 05:22 AM    CHLORIDE 102 06/20/2023 03:40 PM    CO2 20 11/27/2024 05:22 AM    CO2 25 06/20/2023 03:40 PM    ANION 9.0 06/20/2023 03:40 PM    GLUCOSE 86 11/27/2024 05:22 AM    GLUCOSE 83 06/20/2023 03:40 PM    BUN 22 11/27/2024 05:22 AM    BUN 18 06/20/2023 03:40 PM    CREATININE 0.99 11/27/2024 05:22 AM    CREATININE 0.96 06/20/2023 03:40 PM    CALCIUM 9.4 11/27/2024 05:22 AM    CALCIUM 9.7 06/20/2023 03:40 PM    ASTSGOT 22 11/27/2024 05:22 AM    ASTSGOT 20 11/22/2014 08:09 AM    ALTSGPT 15 11/27/2024 05:22 AM    ALTSGPT 15 11/22/2014 08:09 AM    TBILIRUBIN 0.3 11/27/2024 05:22 AM    TBILIRUBIN 0.4 11/22/2014 08:09 AM    ALBUMIN 4.2 11/27/2024 05:22 AM    ALBUMIN 4.3 11/22/2014 08:09 AM    TOTPROTEIN 6.5 11/27/2024 05:22 AM    TOTPROTEIN 7.2 11/22/2014 08:09 AM    GLOBULIN 2.3 11/27/2024 05:22 AM    GLOBULIN 2.9 11/22/2014 08:09 AM    AGRATIO 1.5 11/22/2014 08:09 AM       No results found for: \"PROTHROMBTM\", \"INR\"     Lab Results   Component Value Date/Time    WBC 5.4 12/24/2024 04:15 AM    WBC 9.5 06/20/2023 03:40 PM    RBC 4.19 12/24/2024 04:15 AM    RBC 3.80 (L) 06/20/2023 03:40 PM    HEMOGLOBIN 13.6 12/24/2024 04:15 AM    HEMOGLOBIN 12.8 06/20/2023 03:40 PM    HEMATOCRIT 41.3 12/24/2024 04:15 AM    HEMATOCRIT " "38.4 06/20/2023 03:40 PM    MCV 99 (H) 12/24/2024 04:15 AM    .1 (H) 06/20/2023 03:40 PM    MCH 32.5 12/24/2024 04:15 AM    MCH 33.7 (H) 06/20/2023 03:40 PM    MCHC 32.9 12/24/2024 04:15 AM    MCHC 33.3 06/20/2023 03:40 PM    MPV 10.3 06/20/2023 03:40 PM    NEUTSPOLYS 39 12/24/2024 04:15 AM    NEUTSPOLYS 65.30 06/20/2023 03:40 PM    LYMPHOCYTES 45 12/24/2024 04:15 AM    LYMPHOCYTES 25.40 06/20/2023 03:40 PM    MONOCYTES 7 12/24/2024 04:15 AM    MONOCYTES 5.20 06/20/2023 03:40 PM    EOSINOPHILS 8 12/24/2024 04:15 AM    EOSINOPHILS 3.40 06/20/2023 03:40 PM    BASOPHILS 1 12/24/2024 04:15 AM    BASOPHILS 0.50 06/20/2023 03:40 PM        No results found for: \"HBA1C\"     Imaging:   I personally reviewed following images, these are my reads  MRI lumbar spine 10/19/2023  Annular tear at L5-S1.  Moderate central canal stenosis at L4-5.  Mild central canal stenosis at L3-4.  At L2-3 and L3-4 and L4-5 and L5-S1 there is mild bilateral neuroforaminal stenosis.  Facet joint synovitis at bilateral L4-5.  Facet arthropathy at bilateral L5-S1. See formal radiology report for further details.     MRI lumbar spine 10/19/2023  No evidence of sacroiliitis.    MRI lumbar spine 2/26/2025  At L4-5 there is moderate central canal stenosis and mild bilateral neuroforaminal stenosis worse on the right.  At L5-S1 there is mild bilateral neuroforaminal stenosis.  Annular tear at L5-S1.  Facet arthropathy at multiple levels most severe at bilateral L4-5. See formal radiology report for further details.      IMAGING radiology reads. I reviewed the following radiology reads                      Results for orders placed during the hospital encounter of 10/19/23    MR-LUMBAR SPINE-W/O    Impression  1.  Multilevel degenerative disc disease and facet degeneration. There is moderate central canal narrowing at L4-5, mild central canal narrowing at L3-4 and borderline central canal narrowing at L2-3.    2.  Mild multilevel neural foraminal " narrowing as specifically described above.    3.  Mild multilevel degenerative subluxation.        Results for orders placed during the hospital encounter of 10/19/23    MR-LUMBAR SPINE-W/O    Impression  1.  Multilevel degenerative disc disease and facet degeneration. There is moderate central canal narrowing at L4-5, mild central canal narrowing at L3-4 and borderline central canal narrowing at L2-3.    2.  Mild multilevel neural foraminal narrowing as specifically described above.    3.  Mild multilevel degenerative subluxation.               Results for orders placed during the hospital encounter of 10/19/23    MR-PELVIS W/O    Impression  1.  No evidence of marrow edema or arthropathy of the hip articulations.    2.  No evidence of sacroiliitis.    3.  No evidence of muscle or tendon injury.    4.  Osteitis symphysis pubis.                              Results for orders placed in visit on 21    DX-CLAVICLE LEFT                                            Diagnosis  Visit Diagnoses     ICD-10-CM   1. Lumbar spondylosis  M47.816   2. Annular tear of L5-S1 disc  M51.369   3. Chronic bilateral low back pain without sciatica  M54.50    G89.29   4. Lumbar stenosis without neurogenic claudication  M48.061   5. Facet arthropathy  M47.819   6. Left lumbar radiculitis  M54.16   7. Other chronic pain  G89.29   8. Chronic left-sided thoracic back pain  M54.6    G89.29   9. Neuralgia  M79.2           ASSESSMENT AND PLAN:  Evelyn Mullins (: 1975) is a female with bilateral low back pain that appears to be facetogenic bilaterally, reproduced with positive facet loading maneuver and with facet arthropathy seen on MRI lumbar spine corresponding to her areas of pain.  Also with episodic low back pain that appears possibly secondary to L5-S1 annular tear.     Also with left sided thoracic/UE pain after radiation for left breast cancer     Boogie was seen today for follow-up.    Diagnoses and all orders for this  visit:    Lumbar spondylosis  -     Referral to Pain Clinic    Annular tear of L5-S1 disc    Chronic bilateral low back pain without sciatica    Lumbar stenosis without neurogenic claudication    Facet arthropathy    Left lumbar radiculitis    Other chronic pain  -     pregabalin (LYRICA) 50 MG capsule; Take 1 Capsule by mouth at bedtime for 7 days, THEN 2 Capsules at bedtime for 23 days.    Chronic left-sided thoracic back pain  -     pregabalin (LYRICA) 50 MG capsule; Take 1 Capsule by mouth at bedtime for 7 days, THEN 2 Capsules at bedtime for 23 days.    Neuralgia  -     pregabalin (LYRICA) 50 MG capsule; Take 1 Capsule by mouth at bedtime for 7 days, THEN 2 Capsules at bedtime for 23 days.            PLAN  Physical Therapy: cont PT. At this time the patient continues to have severe low back pain despite completing over 3 months of PT     Diagnostic workup: Personally reviewed at today's visit:   Fluoroscopic images from 3/27/25 indicating successful diagnostic lumbar medial branch blocks targeting the bilateral L4-L5 and L5-S1 facet joints    Medications:   - OK to continue ibuprofen 200mg PRN which significantly helps. Patient would like to minimize medication intake.   -agree with discontinuation of gabapentin given GI side effects with bloating and constipation and cognitive side effects. Had pain relief x 1 weekwith 1200mg QHS.  It had previously improved her postradiation pain of the left torso and upper extremity  -Prescribe trial of Lyrica as above  - I have discussed trial of Cymbalta versus Effexor, she would like to defer these at this time due to side effects that could occur with weaning this medication     Interventions:   - discussed radiofrequency ablation of medial branch nerves targeting Bilateral L4-L5 and L5-S1 facet joints with sedation given over 80% pain relief from diagnostic lumbar medial branch blocks #1 and #2 targeting Bilateral L4-L5 and L5-S1 facet joints. The risks, benefits, and  alternatives to this procedure were discussed and the patient wishes to proceed with the procedure. Risks include but are not limited to damage to surrounding structures, infection, bleeding, worsening of pain which can be permanent, and weakness which can be permanent. Benefits include pain relief and improved function. Alternatives include not doing the procedure.    - discussed possible left sided epidural steroid injection if radicular pain worsens.  Currently it is tolerable.  - discussed consideration of peripheral nerve stimulator for left thoracic pain if medications are inadequate to control her pain and if the severity of her pain warrants     Other  - has done acupuncture with significant relief of left piriformis pain but no significant relief of low back pain most recently   - of note patient was on tamoxifen for 1 year. She went off of tamoxifen for 6 weeks starting in Nov, and during this time she had significant improvement in back pain for 2 weeks but unfortunately the pain worsened since. She notes hx of left foot pain that was thought to be an achilles injury but denies significant pain in all of her joints.  I have discussed that I would expect for tamoxifen to increase her pain more diffusely then only in her low back.  I shared this information with her oncology team    Follow-up: 4 weeks after lumbar radiofrequency ablation    Orders Placed This Encounter    Referral to Pain Clinic    pregabalin (LYRICA) 50 MG capsule       Doris Nevarez MD  Interventional Pain and Spine  Physical Medicine and Rehabilitation  [facility]      The above note documents my personal evaluation of this patient. In addition, I have reviewed and confirmed with the patient and MA the supportive information documented in today's Patient Health Questionnaire and Office Note.     Please note that this dictation was created using voice recognition software. I have made every reasonable attempt to correct obvious  errors, but I expect that there are errors of grammar and possibly content that I did not discover before finalizing the note.

## 2025-04-03 NOTE — Clinical Note
REFERRAL APPROVAL NOTICE         Sent on April 3, 2025                   Boogienette Duarte Susanna  [address] [place] [address] [place] [address]                   Dear Ms. Mullins,    After a careful review of the medical information and benefit coverage, [facility] has processed your referral. See below for additional details.    If applicable, you must be actively enrolled with your insurance for coverage of the authorized service. If you have any questions regarding your coverage, please contact your insurance directly.    REFERRAL INFORMATION   Referral #:  [id number]  Referred-To Department    Referred-By Provider:  Physical Medicine and Rehab    Doris Nevarez M.D.   Pain Management       [address]  [address]  [place] [address]  [phone] [address] [place] [address]  [place] [address]  [phone]    Referral Start Date:  04/03/2025  Referral End Date:   07/03/2025             SCHEDULING  If you do not already have an appointment, please call [phone] to make an appointment.     MORE INFORMATION  If you do not already have a [employer] account, sign up at: [email or web address]  You can access your medical information, make appointments, see lab results, billing information, and more.  If you have questions regarding this referral, please contact  the [facility] Referrals department at:             [phone]. Monday - Friday 8:00AM - 5:00PM.     Sincerely,    [facility]

## 2025-04-17 ENCOUNTER — APPOINTMENT (OUTPATIENT)
Dept: RADIOLOGY | Facility: REHABILITATION | Age: 50
End: 2025-04-17
Attending: STUDENT IN AN ORGANIZED HEALTH CARE EDUCATION/TRAINING PROGRAM
Payer: COMMERCIAL

## 2025-04-17 ENCOUNTER — HOSPITAL ENCOUNTER (OUTPATIENT)
Facility: REHABILITATION | Age: 50
End: 2025-04-17
Attending: STUDENT IN AN ORGANIZED HEALTH CARE EDUCATION/TRAINING PROGRAM | Admitting: STUDENT IN AN ORGANIZED HEALTH CARE EDUCATION/TRAINING PROGRAM
Payer: COMMERCIAL

## 2025-04-17 ENCOUNTER — HOSPITAL ENCOUNTER (OUTPATIENT)
Dept: RADIOLOGY | Facility: MEDICAL CENTER | Age: 50
End: 2025-04-17
Attending: OBSTETRICS & GYNECOLOGY
Payer: COMMERCIAL

## 2025-04-17 VITALS
HEART RATE: 70 BPM | TEMPERATURE: 98 F | DIASTOLIC BLOOD PRESSURE: 88 MMHG | SYSTOLIC BLOOD PRESSURE: 128 MMHG | OXYGEN SATURATION: 100 % | WEIGHT: 139.11 LBS | HEIGHT: 63 IN | RESPIRATION RATE: 16 BRPM | BODY MASS INDEX: 24.65 KG/M2

## 2025-04-17 DIAGNOSIS — N83.202 LEFT OVARIAN CYST: ICD-10-CM

## 2025-04-17 PROCEDURE — 64636 DESTROY L/S FACET JNT ADDL: CPT | Mod: RT,PO

## 2025-04-17 PROCEDURE — 64635 DESTROY LUMB/SAC FACET JNT: CPT

## 2025-04-17 PROCEDURE — 64636 DESTROY L/S FACET JNT ADDL: CPT

## 2025-04-17 PROCEDURE — 99153 MOD SED SAME PHYS/QHP EA: CPT

## 2025-04-17 PROCEDURE — 700111 HCHG RX REV CODE 636 W/ 250 OVERRIDE (IP): Mod: JZ

## 2025-04-17 PROCEDURE — 99152 MOD SED SAME PHYS/QHP 5/>YRS: CPT

## 2025-04-17 PROCEDURE — 76830 TRANSVAGINAL US NON-OB: CPT

## 2025-04-17 RX ORDER — MIDAZOLAM HYDROCHLORIDE 1 MG/ML
INJECTION INTRAMUSCULAR; INTRAVENOUS
Status: COMPLETED
Start: 2025-04-17 | End: 2025-04-17

## 2025-04-17 RX ORDER — LIDOCAINE HYDROCHLORIDE 10 MG/ML
INJECTION, SOLUTION EPIDURAL; INFILTRATION; INTRACAUDAL; PERINEURAL
Status: COMPLETED
Start: 2025-04-17 | End: 2025-04-17

## 2025-04-17 RX ADMIN — MIDAZOLAM HYDROCHLORIDE 1 MG: 1 INJECTION, SOLUTION INTRAMUSCULAR; INTRAVENOUS at 08:35

## 2025-04-17 RX ADMIN — FENTANYL CITRATE 50 MCG: 50 INJECTION, SOLUTION INTRAMUSCULAR; INTRAVENOUS at 08:35

## 2025-04-17 RX ADMIN — LIDOCAINE HYDROCHLORIDE 10 ML: 10 INJECTION, SOLUTION EPIDURAL; INFILTRATION; INTRACAUDAL; PERINEURAL at 08:43

## 2025-04-17 ASSESSMENT — FIBROSIS 4 INDEX: FIB4 SCORE: 0.94

## 2025-04-17 ASSESSMENT — PAIN DESCRIPTION - PAIN TYPE: TYPE: CHRONIC PAIN

## 2025-04-17 NOTE — OP REPORT
Patient: Evelyn Mullins 50 y.o. female MRN: [id number]     Date of Service: 4/17/2025     Physician/s: Doris Nevarez MD    Pre-operative Diagnosis: Lumbar spondylosis, facet arthropathy.      Post-operative Diagnosis: Lumbar spondylosis, facet arthropathy.     Procedure: Medial Branch Radiofrequency neurotomy targeting the right and left L4-L5 and L5-S1 facet joint(s) with sedation.     Description of procedure:    The patient was not treated for radiculopathy at this time    The risks, benefits, and alternatives of the procedure were reviewed and discussed with the patient.  Written informed consent was freely obtained. A pre-procedural time-out was conducted by the physician verifying patient’s identity, procedure to be performed, procedure site and side, and allergy verification. Appropriate equipment was determined to be in place for the procedure.     Moderation sedation was achieved with Versed (1mg) and Fentanyl (50mcg). Monitoring of the patients vital signs and respiratory status was provided by trained independent registered nurse during the entire course of the procedures and under my supervision and recoded in the patient’s medical record. The duration of sedation was over 10 minutes.     The patient's vital signs were carefully monitored before, throughout, and after the procedure.     In the fluoroscopy suite the patient was placed in a prone position, and a pillow was placed underneath the level of the umbilicus. The skin was prepped and draped in the usual sterile fashion. The fluoroscope was placed over the low back at the appropriate angles, and the targets for needle/probe placement were marked. A 25g needle was placed into each of the markings at three levels, and approx 1cc of 1% Lidocaine was injected subcutaneously into the epidermal and dermal layers. The needle was removed.     A 18 gauge, 10 cm RFN cannula with a 10 mm active tip was then placed into the skin using fluoroscopic guidance  and advanced with an oblique view towards the intersection of the transverse process and superior articular process L4-5 facet joint where the L3 medial branch runs on the right side. The needle/probe tips were then verified by AP, oblique, and lateral views.     A 18 gauge, 10 cm RFN cannula with a 10 mm active tip was then placed into the skin using fluoroscopic guidance and advanced with an oblique view towards the intersection of the transverse process and superior articular process L5-S1 facet joint where the L4 medial branch runs on the right side. The needle/probe tips were then verified by AP, oblique, and lateral views.     A 18 gauge, 10 cm RFN cannula with a 10 mm active tip was then placed into the skin using fluoroscopic guidance and advanced with an oblique view towards the intersection of the transverse process and superior articular process S1 facet where the L5 dorsal ramus runs on the right side. The needle/probe tips were then verified by AP, oblique, and lateral views.     Motor stimulation is used as an extra precaution to ensure the needle tips are off the lumbar nerve roots prior to each lesion. Following negative aspiration, 3cc of 1% lidocaine was injected at each of the above locations. The needles are not moved, but fluoroscope guidance is used to ensure the needles have not moved. After a wait period of approximately 2 minutes, a radiofrequency lesion was then created at each level with a temperature of 80 degrees centigrade for 90 seconds.     The probes were adjusted to a 2nd location and images were saved in 2+ views. Motor testing was done which confirmed no twitching in the leg and a 2nd radiofrequency lesion was made with 80°C for 90 seconds.      The cannulas were restyletted, and were then removed intact.     Then attention was turned to the left side.    A 18 gauge, 10 cm RFN cannula with a 10 mm active tip was then placed into the skin using fluoroscopic guidance and advanced  with an oblique view towards the intersection of the transverse process and superior articular process L4-5 facet joint where the L3 medial branch runs on the left side. The needle/probe tips were then verified by AP, oblique, and lateral views.     A 18 gauge, 10 cm RFN cannula with a 10 mm active tip was then placed into the skin using fluoroscopic guidance and advanced with an oblique view towards the intersection of the transverse process and superior articular process L5-S1 facet joint where the L4 medial branch runs on the left side. The needle/probe tips were then verified by AP, oblique, and lateral views.     Then A 18 gauge, 10 cm RFN cannula with a 10 mm active tip was then placed into the skin using fluoroscopic guidance and advanced with an oblique view towards the intersection of the transverse process and superior articular process S1 facet where the L5 dorsal ramus runs on the left side. The needle/probe tips were then verified by AP, oblique, and lateral views.     Motor stimulation is used as an extra precaution to ensure the needle tips are off the lumbar nerve roots prior to each lesion. Following negative aspiration, 3cc of 1% Lidocaine was injected at each of the above levels. The needles are not moved, and fluoroscope guidance is used to ensure the needles have not moved. After a wait period of approximately 2 minutes, a radiofrequency lesion was then created at each level with a temperature of 80 degrees centigrade for 90 seconds.     The probes were adjusted to a 2nd location and images were saved in 2+ views. Motor testing was done which confirmed no twitching in the leg and a 2nd radiofrequency lesion was made of 80 °C for 90 seconds.    The cannulas were restyletted, and were then removed intact.     Fluoroscopic images in AP and lateral view were saved prior to each radiofrequency neurotomy.    The patient's back was covered with a 4x4 gauze, the area was cleansed with sterile normal  saline, and a dressing was applied. There were no complications noted, the patient remained hemodynamically stable, and the patient tolerated the procedure well. The patient was examined in the postoperative area and the strength exam was identical as prior to the procedure.    Follow-up as scheduled    Doris Nevarez MD  Interventional Pain and Spine  Physical Medicine and Rehabilitation  [facility]    Pre-procedure pain score: 8/10 on NRS  Post-procedure pain score: 5/10 on NRS        CPT  Radiofrequency ablation (RFA) - lumbar or sacral (1st joint):  39642-91  Radiofrequency ablation (RFA) - lumbar or sacral (each additional joint):  24990-68  moderate procedural sedation first 15 minutes: [id number]

## 2025-04-17 NOTE — PROGRESS NOTES
Pt c/o stabbing pain in her left gluteal muscle. States it is getting worse since procedure finished. Dr. Nevarez in room to talk with patient. Reassured and plan of care discussed. Pt c/o light headiness. HOB down and ice pack applied to neck and forehead. VSS

## 2025-04-17 NOTE — PROGRESS NOTES
1008 pt feeling much better. No c/o dizziness.  1012 Dr. Nevarez in room. Ok to discharge. Pt stable no dizziness.

## 2025-04-17 NOTE — INTERVAL H&P NOTE
Consented Procedure: RIGHT and LEFT radiofrequency neurotomies medial branch targeting the L4-5 and L5-S1 facet joints with fluoroscopic guidance and sedation…Note: Plan for 80 °C for 90 seconds for each neurotomy  I have examined the patient, provided the risks, benefits, and alternatives to the procedure(s) indicated on the signed consent form, and the patient wishes to proceed.    H&P reviewed. The patient was examined and there are no changes to the H&P      Doris Nevarez M.D.  04/17/25 8:30 AM

## 2025-04-25 DIAGNOSIS — M79.2 NEURALGIA: ICD-10-CM

## 2025-04-25 DIAGNOSIS — G89.29 CHRONIC LEFT-SIDED THORACIC BACK PAIN: ICD-10-CM

## 2025-04-25 DIAGNOSIS — G89.29 OTHER CHRONIC PAIN: ICD-10-CM

## 2025-04-25 DIAGNOSIS — M54.6 CHRONIC LEFT-SIDED THORACIC BACK PAIN: ICD-10-CM

## 2025-04-29 RX ORDER — PREGABALIN 50 MG/1
100 CAPSULE ORAL
Qty: 60 CAPSULE | Refills: 0 | Status: SHIPPED | OUTPATIENT
Start: 2025-04-29 | End: 2025-05-29

## 2025-04-29 NOTE — TELEPHONE ENCOUNTER
Received request via: Patient    Was the patient seen in the last year in this department? Yes    Does the patient have an active prescription (recently filled or refills available) for medication(s) requested?  yes    Pharmacy Name: [id number]    Does the patient have half-way Plus and need 100-day supply? (This applies to ALL medications) Patient does not have SCP

## 2025-05-15 ENCOUNTER — APPOINTMENT (OUTPATIENT)
Dept: RADIOLOGY | Facility: MEDICAL CENTER | Age: 50
End: 2025-05-15
Payer: COMMERCIAL

## 2025-05-19 ENCOUNTER — APPOINTMENT (OUTPATIENT)
Dept: RADIOLOGY | Facility: MEDICAL CENTER | Age: 50
End: 2025-05-19
Payer: COMMERCIAL

## 2025-05-20 ENCOUNTER — APPOINTMENT (OUTPATIENT)
Dept: PHYSICAL MEDICINE AND REHAB | Facility: MEDICAL CENTER | Age: 50
End: 2025-05-20
Payer: COMMERCIAL

## 2025-05-20 VITALS
WEIGHT: 140.43 LBS | SYSTOLIC BLOOD PRESSURE: 119 MMHG | HEIGHT: 63 IN | BODY MASS INDEX: 24.88 KG/M2 | OXYGEN SATURATION: 97 % | TEMPERATURE: 97.1 F | DIASTOLIC BLOOD PRESSURE: 79 MMHG | HEART RATE: 75 BPM

## 2025-05-20 DIAGNOSIS — M54.6 CHRONIC LEFT-SIDED THORACIC BACK PAIN: Primary | ICD-10-CM

## 2025-05-20 DIAGNOSIS — G89.29 CHRONIC BILATERAL LOW BACK PAIN WITHOUT SCIATICA: ICD-10-CM

## 2025-05-20 DIAGNOSIS — M48.061 LUMBAR STENOSIS WITHOUT NEUROGENIC CLAUDICATION: ICD-10-CM

## 2025-05-20 DIAGNOSIS — M54.16 LEFT LUMBAR RADICULITIS: ICD-10-CM

## 2025-05-20 DIAGNOSIS — M79.2 NEURALGIA: ICD-10-CM

## 2025-05-20 DIAGNOSIS — M47.816 LUMBAR SPONDYLOSIS: ICD-10-CM

## 2025-05-20 DIAGNOSIS — M51.369 ANNULAR TEAR OF LUMBAR DISC: ICD-10-CM

## 2025-05-20 DIAGNOSIS — M54.50 CHRONIC BILATERAL LOW BACK PAIN WITHOUT SCIATICA: ICD-10-CM

## 2025-05-20 DIAGNOSIS — M79.10 MYALGIA: ICD-10-CM

## 2025-05-20 DIAGNOSIS — M47.819 FACET ARTHROPATHY: ICD-10-CM

## 2025-05-20 DIAGNOSIS — G89.29 CHRONIC LEFT-SIDED THORACIC BACK PAIN: Primary | ICD-10-CM

## 2025-05-20 DIAGNOSIS — G89.29 OTHER CHRONIC PAIN: ICD-10-CM

## 2025-05-20 PROCEDURE — 3074F SYST BP LT 130 MM HG: CPT | Performed by: STUDENT IN AN ORGANIZED HEALTH CARE EDUCATION/TRAINING PROGRAM

## 2025-05-20 PROCEDURE — 99214 OFFICE O/P EST MOD 30 MIN: CPT | Performed by: STUDENT IN AN ORGANIZED HEALTH CARE EDUCATION/TRAINING PROGRAM

## 2025-05-20 PROCEDURE — 1125F AMNT PAIN NOTED PAIN PRSNT: CPT | Performed by: STUDENT IN AN ORGANIZED HEALTH CARE EDUCATION/TRAINING PROGRAM

## 2025-05-20 PROCEDURE — 3078F DIAST BP <80 MM HG: CPT | Performed by: STUDENT IN AN ORGANIZED HEALTH CARE EDUCATION/TRAINING PROGRAM

## 2025-05-20 RX ORDER — PREGABALIN 50 MG/1
50 CAPSULE ORAL
Qty: 30 CAPSULE | Refills: 0 | Status: SHIPPED | OUTPATIENT
Start: 2025-05-29 | End: 2025-06-28

## 2025-05-20 ASSESSMENT — PAIN SCALES - GENERAL: PAINLEVEL_OUTOF10: 7=MODERATE-SEVERE PAIN

## 2025-05-20 ASSESSMENT — FIBROSIS 4 INDEX: FIB4 SCORE: 0.94

## 2025-05-20 ASSESSMENT — PATIENT HEALTH QUESTIONNAIRE - PHQ9: CLINICAL INTERPRETATION OF PHQ2 SCORE: 0

## 2025-05-20 NOTE — H&P (VIEW-ONLY)
Verbal consent was acquired by the patient to use [employer] ambient listening note generation during this visit Yes     Follow-up patient Note    Interventional Pain and Spine  Physiatry (Physical Medicine and Rehabilitation)     Patient Name: Evelyn Mullins  : 1975  Date of service: 2025    Chief Complaint:   Chief Complaint   Patient presents with    Follow-Up     4wk post SP       HISTORY FROM INITIAL VISIT (2025):     History of Present Illness  Evelyn Mullins is a 50-year-old female who presents for a new patient visit.     She reports experiencing back pain, which began in early  as left-sided discomfort, initially attributed to her piriformis muscle. The pain radiated down her leg, prompting her to seek physical therapy. Despite several months of therapy, her condition did not improve, leading her to consult with Dr. Maciel. A pelvic and lumbar MRI was performed, but no specific diagnosis was made. An injection was suggested, but she declined. Instead, she opted for acupuncture, which significantly alleviated her symptoms after three sessions. However, in , she began experiencing generalized back pain. In 2024, she sustained a left Achilles injury, which was managed by her physical therapist. As her Achilles injury improved, her lower back pain worsened, particularly with rotational movements. Her physical therapist identified instabilities in her lower lumbar facet joints or SI joint, and they have been focusing on core stability without significant progress.       Her movement patterns have been distorted due to her back issues. She reports no radiating pain down her right leg but feels discomfort in her hip. She has not been able to correlate her pain with any specific activities. She finds it difficult to rise from a chair and experiences pain upon waking up in the morning. Prolonged sitting followed by standing exacerbates her pain, but walking provides some  relief. She occasionally experiences tingling in her left leg, but it is less frequent and intense than when it first started in 2023. She reports no weakness, numbness, or tingling in her legs. She has tried acupuncture again this fall, focusing on her back, but it did not help.      She has been off tamoxifen for 6 weeks since 2024, during which she experienced a brief period of relief. She resumed tamoxifen at a lower dose in 2025 but discontinued it a week ago. Her oncologist suggested a possible link between tamoxifen and her symptoms, however she denies significant pain all over with resuming tamoxifen.     She reports constant discomfort at her left torso and upper extremity after surgery and radiation for breast cancer, which is present even at rest.     Her physical therapist at [facility] has been dry needling her lower lumbar and SI joint. She finds that consciously engaging her abs and standing up reduces her pain. She takes ibuprofen 200 mg as needed for pain relief. She has not tried gabapentin or duloxetine for her radiation-induced pain. She is considering Cymbalta as suggested by her primary care physician. She is not interested in taking Effexor due to possible side effects. She has a new MRI scheduled at [facility]. She reports no pregnancy, diabetes, pacemaker or wiring, or claustrophobia.     MEDICATIONS  Current: tamoxifen, ibuprofen    HPI  Today's visit   Evelyn Mullins ( 1975) is a female with The primary encounter diagnosis was Chronic left-sided thoracic back pain. Diagnoses of Neuralgia, Annular tear of lumbar disc, Chronic bilateral low back pain without sciatica, Lumbar stenosis without neurogenic claudication, Left lumbar radiculitis, Other chronic pain, Myalgia, Facet arthropathy, and Lumbar spondylosis were also pertinent to this visit.    Presents today after  25 Medial Branch Radiofrequency neurotomy targeting the right and left  L4-L5 and L5-S1 facet joint(s) with sedation.         History of Present Illness  The patient is a 50-year-old female who presents for follow-up.    Improved Mobility Post-Ablation  - Improved mobility post-ablation  - Severe bilateral gluteal and lower back soreness with radiating pain down left leg, worsening with sitting  - Pain described as sharp, stabbing  - No specific incident exacerbating pain    Increased Activity Level  - Increased activity level over past 3-4 weeks  - Evening and morning soreness, alleviated by movement  - Attends physical therapy 1-2 times/week  - Advised to engage core muscles but finds it challenging due to back pain  - Lacrosse ball, foam roller, and dry needling by PT exacerbated pain    Training for 100K Race  - Training for 100K race in August 2025  - Increased running and cycling distances  - Pain occurs post-activity  - Pre-ablation, could run 7 miles before pain; now can run 4 hours before pain onset    Medication  - Prescribed Lyrica 50 mg BID  - Beneficial but causes drowsiness and confusion, especially at bedtime  - Reduced dose to 50 mg at night, this improves the pain to the same degree as 100 mg nightly, with less drowsiness    Supplemental information: None    MEDICATIONS  - Lyrica    Pain severity 7/10 currently  Pt denies new numbness, tingling, or weakness.    Procedure history:  - 3/5/25 diagnostic lumbar medial branch blocks targeting the bilateral L4-L5 and L5-S1 facet joints -over 80% relief of index pain  - 3/27/25 diagnostic lumbar medial branch blocks targeting the bilateral L4-L5 and L5-S1 facet joints -over 80% relief of index pain  - 4/17/25 Medial Branch Radiofrequency neurotomy targeting the right and left L4-L5 and L5-S1 facet joint(s) with sedation.  Resolution of index pain on exam and at least 50% improvement in mobility and ability to perform ADLs such as transitioning from sit to standing        ROS:   Red Flags ROS:   Fever, Chills, Sweats:  Denies  Involuntary Weight Loss: Denies  Bladder Incontinence: Denies  Bowel Incontinence: denies  Saddle Anesthesia: Denies    All other systems reviewed and negative.     PMHx:   Past Medical History:   Diagnosis Date    Bronchitis     Cancer (HCC) 05/30/23    High cholesterol     Technically high but not being treated    Malignant neoplasm of upper-outer quadrant of left breast in female, estrogen receptor positive (HCC) 6/20/2023    Urinary incontinence     Sneezing, jumping       PSHx:   Past Surgical History:   Procedure Laterality Date    RADIO FREQUENCY ABLATION ADDITIONAL LEVEL Bilateral 4/17/2025    Procedure: RIGHT and LEFT radiofrequency neurotomies medial branch targeting the L4-5 and L5-S1 facet joints with fluoroscopic guidance and sedation…Note: Plan for 80 °C for 90 seconds for each neurotomy;  Surgeon: Doris Nevarez M.D.;  [facility];  Service: Pain Management    LUMBAR MEDIAL BRANCH BLOCKS Bilateral 3/27/2025    Procedure: Diagnostic medial branch blocks targeting the BILATERAL L4-5 and L5-S1 facet joints with fluoroscopic guidance #2;  Surgeon: Doris Nevarez M.D.;  Location: [facility];  Service: Pain Management    LUMBAR MEDIAL BRANCH BLOCKS Bilateral 3/5/2025    Procedure: Diagnostic medial branch blocks targeting the BILATERAL L4-5 and L5-S1 facet joints with fluoroscopic guidance #1;  Surgeon: Doris Nevarez M.D.;  Location: [facility];  Service: Pain Management    PB MASTECTOMY, PARTIAL Left 6/27/2023    Procedure: WIRE LOCALIZED LEFT PARTIAL MASTECTOMY, MASTOPEXY, LEFT SENTINEL LYMPH INJECTION WITH EXCISION OF AXILLARY NODES;  Surgeon: Antionette Au M.D.;  Location: SURGERY SAME DAY [facility];  Service: General    IL SUSPENSION OF BREAST Left 6/27/2023    Procedure: MASTOPEXY;  Surgeon: Antionette Au M.D.;  Location: SURGERY SAME DAY [facility];  Service: General    NODE BIOPSY SENTINEL Left 6/27/2023    Procedure:  BIOPSY, LYMPH NODE, SENTINEL;  Surgeon: Antionette Au M.D.;  Location: SURGERY SAME DAY [facility];  Service: General    NO PERTINENT PAST SURGICAL HISTORY         Family Hx:   History reviewed. No pertinent family history.    Social Hx:  Social History     Socioeconomic History    Marital status: Single     Spouse name: Not on file    Number of children: Not on file    Years of education: Not on file    Highest education level: Not on file   Occupational History    Not on file   Tobacco Use    Smoking status: Never     Passive exposure: Never    Smokeless tobacco: Never   Vaping Use    Vaping status: Never Used   Substance and Sexual Activity    Alcohol use: Not Currently    Drug use: Not Currently     Types: Inhaled     Comment: Tried marijuana a few times    Sexual activity: Not on file   Other Topics Concern    Not on file   Social History Narrative    Not on file     Social Drivers of Health     Financial Resource Strain: Not on file   Food Insecurity: Not on file   Transportation Needs: Not on file   Physical Activity: Not on file   Stress: Not on file   Social Connections: Not on file   Intimate Partner Violence: Not on file   Housing Stability: Not on file       Allergies:  Allergies   Allergen Reactions    Pcn [Penicillins] Anaphylaxis    Shellfish Allergy Swelling       Medications: reviewed on epic.   Outpatient Medications Marked as Taking for the 5/20/25 encounter (Office Visit) with Doris Nevarez M.D.   Medication Sig Dispense Refill    [START ON 5/29/2025] pregabalin (LYRICA) 50 MG capsule Take 1 Capsule by mouth at bedtime for 30 days. 30 Capsule 0    levothyroxine (SYNTHROID) 200 MCG Tab       tamoxifen (NOLVADEX) 10 MG Tab Take 0.5 Tablets by mouth every day. 90 Tablet 3    Omega-3 Fatty Acids (FISH OIL) 1000 MG Cap capsule Take 1,000 mg by mouth 3 times a day with meals.      EPSOLAY 5 % cream       Melatonin 1 MG/4ML Liquid       Probiotic Product (PROBIOTIC + TURMERIC EXTRACT) 400 MG Cap        "glutamine 500 mg/mL oral suspension       fluticasone (FLONASE) 50 MCG/ACT nasal spray Administer 1 Spray into affected nostril(S) every day.      VITAMIN D PO Take 2,000 Int'l Units by mouth every day.      MAGNESIUM PO Take 400 mg by mouth every day.      multivitamin Tab Take 1 Tablet by mouth every day.      Cetirizine HCl (ZYRTEC ALLERGY PO) Take 10 mg by mouth every day.          Current Outpatient Medications on File Prior to Visit   Medication Sig Dispense Refill    levothyroxine (SYNTHROID) 200 MCG Tab       tamoxifen (NOLVADEX) 10 MG Tab Take 0.5 Tablets by mouth every day. 90 Tablet 3    Omega-3 Fatty Acids (FISH OIL) 1000 MG Cap capsule Take 1,000 mg by mouth 3 times a day with meals.      EPSOLAY 5 % cream       Melatonin 1 MG/4ML Liquid       Probiotic Product (PROBIOTIC + TURMERIC EXTRACT) 400 MG Cap       glutamine 500 mg/mL oral suspension       fluticasone (FLONASE) 50 MCG/ACT nasal spray Administer 1 Spray into affected nostril(S) every day.      VITAMIN D PO Take 2,000 Int'l Units by mouth every day.      MAGNESIUM PO Take 400 mg by mouth every day.      multivitamin Tab Take 1 Tablet by mouth every day.      Cetirizine HCl (ZYRTEC ALLERGY PO) Take 10 mg by mouth every day.       No current facility-administered medications on file prior to visit.         EXAMINATION     Physical Exam:   /79   Pulse 75   Temp 36.2 °C (97.1 °F) (Temporal)   Ht 1.6 m (5' 3\")   Wt 63.7 kg (140 lb 6.9 oz)   SpO2 97%     Constitutional:   Body Habitus: Body mass index is 24.88 kg/m².  Cooperation: Fully cooperates with exam  Appearance: Well-groomed, well-nourished.    Eyes: No scleral icterus to suggest severe liver disease, no proptosis to suggest severe hyperthyroidism    ENT -no obvious auditory deficits, no noticeable facial droop     Skin -no rashes or lesions noted     Respiratory-  breathing comfortably on room air, no audible wheezing    Cardiovascular-distal extremities warm and well perfused.  No " lower extremity edema is noted.     Gastrointestinal - no obvious abdominal masses, non-distended    Psychiatric- alert and oriented ×3. Normal affect.     Gait - normal gait, no use of ambulatory device, nonantalgic.     Musculoskeletal and Neuro -      Thoracic/Lumbar Spine/Sacral Spine/Hips   Inspection: No evidence of atrophy in bilateral lower extremities throughout      Full active range of motion with lumbar extension     Facet loading maneuver negative bilaterally     Palpation:   No tenderness to palpation over the paraspinal muscles bilaterally, lumbar facets bilaterally   Tenderness to palpation over bilateral glutes overlying iliac crests    Lumbar spine /hip provocative exam maneuvers  SLR, NEVA, and FADIR negative bilaterally.  SLR on the left reproduces sensation of tightness in hamstring    Key points for the international standards for neurological classification of spinal cord injury (ISNCSCI) to light touch.   Dermatome R L   L2 2 2   L3 2 2   L4 2 2   L5 2 2   S1 2 2   S2 2 2         Motor Exam Lower Extremities  ? Myotome R L   Hip flexion L2 5 5   Knee extension L3 5 5   Ankle dorsiflexion L4 5 5   Toe extension L5 5 5   Ankle plantarflexion S1 5 5       previous exam  Reflexes  ?   R L   Patella   2+ 2+   Achilles    2+ 2+      Clonus of the ankle negative bilaterally          MEDICAL DECISION MAKING    Medical records review: see under HPI section.     DATA    Labs: No new labs available for review since last visit.   Lab Results   Component Value Date/Time    SODIUM 141 11/27/2024 05:22 AM    SODIUM 136 06/20/2023 03:40 PM    POTASSIUM 4.4 11/27/2024 05:22 AM    POTASSIUM 3.9 06/20/2023 03:40 PM    CHLORIDE 109 (H) 11/27/2024 05:22 AM    CHLORIDE 102 06/20/2023 03:40 PM    CO2 20 11/27/2024 05:22 AM    CO2 25 06/20/2023 03:40 PM    ANION 9.0 06/20/2023 03:40 PM    GLUCOSE 86 11/27/2024 05:22 AM    GLUCOSE 83 06/20/2023 03:40 PM    BUN 22 11/27/2024 05:22 AM    BUN 18 06/20/2023 03:40 PM     "CREATININE 0.99 11/27/2024 05:22 AM    CREATININE 0.96 06/20/2023 03:40 PM    CALCIUM 9.4 11/27/2024 05:22 AM    CALCIUM 9.7 06/20/2023 03:40 PM    ASTSGOT 22 11/27/2024 05:22 AM    ASTSGOT 20 11/22/2014 08:09 AM    ALTSGPT 15 11/27/2024 05:22 AM    ALTSGPT 15 11/22/2014 08:09 AM    TBILIRUBIN 0.3 11/27/2024 05:22 AM    TBILIRUBIN 0.4 11/22/2014 08:09 AM    ALBUMIN 4.2 11/27/2024 05:22 AM    ALBUMIN 4.3 11/22/2014 08:09 AM    TOTPROTEIN 6.5 11/27/2024 05:22 AM    TOTPROTEIN 7.2 11/22/2014 08:09 AM    GLOBULIN 2.3 11/27/2024 05:22 AM    GLOBULIN 2.9 11/22/2014 08:09 AM    AGRATIO 1.5 11/22/2014 08:09 AM       No results found for: \"PROTHROMBTM\", \"INR\"     Lab Results   Component Value Date/Time    WBC 5.4 12/24/2024 04:15 AM    WBC 9.5 06/20/2023 03:40 PM    RBC 4.19 12/24/2024 04:15 AM    RBC 3.80 (L) 06/20/2023 03:40 PM    HEMOGLOBIN 13.6 12/24/2024 04:15 AM    HEMOGLOBIN 12.8 06/20/2023 03:40 PM    HEMATOCRIT 41.3 12/24/2024 04:15 AM    HEMATOCRIT 38.4 06/20/2023 03:40 PM    MCV 99 (H) 12/24/2024 04:15 AM    .1 (H) 06/20/2023 03:40 PM    MCH 32.5 12/24/2024 04:15 AM    MCH 33.7 (H) 06/20/2023 03:40 PM    MCHC 32.9 12/24/2024 04:15 AM    MCHC 33.3 06/20/2023 03:40 PM    MPV 10.3 06/20/2023 03:40 PM    NEUTSPOLYS 39 12/24/2024 04:15 AM    NEUTSPOLYS 65.30 06/20/2023 03:40 PM    LYMPHOCYTES 45 12/24/2024 04:15 AM    LYMPHOCYTES 25.40 06/20/2023 03:40 PM    MONOCYTES 7 12/24/2024 04:15 AM    MONOCYTES 5.20 06/20/2023 03:40 PM    EOSINOPHILS 8 12/24/2024 04:15 AM    EOSINOPHILS 3.40 06/20/2023 03:40 PM    BASOPHILS 1 12/24/2024 04:15 AM    BASOPHILS 0.50 06/20/2023 03:40 PM        No results found for: \"HBA1C\"     Imaging:   I personally reviewed following images, these are my reads  MRI lumbar spine 10/19/2023  Annular tear at L5-S1.  Moderate central canal stenosis at L4-5.  Mild central canal stenosis at L3-4.  At L2-3 and L3-4 and L4-5 and L5-S1 there is mild bilateral neuroforaminal stenosis.  Facet joint " synovitis at bilateral L4-5.  Facet arthropathy at bilateral L5-S1. See formal radiology report for further details.     MRI lumbar spine 10/19/2023  No evidence of sacroiliitis.    MRI lumbar spine 2/26/2025  At L4-5 there is moderate central canal stenosis and mild bilateral neuroforaminal stenosis worse on the right.  At L5-S1 there is mild bilateral neuroforaminal stenosis.  Annular tear at L5-S1.  Facet arthropathy at multiple levels most severe at bilateral L4-5. See formal radiology report for further details.      IMAGING radiology reads. I reviewed the following radiology reads                      Results for orders placed during the hospital encounter of 10/19/23    MR-LUMBAR SPINE-W/O    Impression  1.  Multilevel degenerative disc disease and facet degeneration. There is moderate central canal narrowing at L4-5, mild central canal narrowing at L3-4 and borderline central canal narrowing at L2-3.    2.  Mild multilevel neural foraminal narrowing as specifically described above.    3.  Mild multilevel degenerative subluxation.        Results for orders placed during the hospital encounter of 10/19/23    MR-LUMBAR SPINE-W/O    Impression  1.  Multilevel degenerative disc disease and facet degeneration. There is moderate central canal narrowing at L4-5, mild central canal narrowing at L3-4 and borderline central canal narrowing at L2-3.    2.  Mild multilevel neural foraminal narrowing as specifically described above.    3.  Mild multilevel degenerative subluxation.               Results for orders placed during the hospital encounter of 10/19/23    MR-PELVIS W/O    Impression  1.  No evidence of marrow edema or arthropathy of the hip articulations.    2.  No evidence of sacroiliitis.    3.  No evidence of muscle or tendon injury.    4.  Osteitis symphysis pubis.                              Results for orders placed in visit on 06/07/21    DX-CLAVICLE LEFT                                             Diagnosis  Visit Diagnoses     ICD-10-CM   1. Chronic left-sided thoracic back pain  M54.6    G89.29   2. Neuralgia  M79.2   3. Annular tear of lumbar disc  M51.369   4. Chronic bilateral low back pain without sciatica  M54.50    G89.29   5. Lumbar stenosis without neurogenic claudication  M48.061   6. Left lumbar radiculitis  M54.16   7. Other chronic pain  G89.29   8. Myalgia  M79.10   9. Facet arthropathy  M47.819   10. Lumbar spondylosis  M47.816             ASSESSMENT AND PLAN:  Evelyn Mullins (: 1975) is a female who initially presented with bilateral low back pain that appeared to be facetogenic bilaterally, reproduced with positive facet loading maneuver and with facet arthropathy seen on MRI lumbar spine corresponding to her areas of pain, now improved after bilateral lumbar medial branch RFA.      Now with worsening radicular symptoms in left leg in L5 dermatomal distribution likely secondary to left lumbar radiculitis     Also with left sided thoracic/UE pain after radiation for left breast cancer     Boogie was seen today for follow-up.    Diagnoses and all orders for this visit:    Chronic left-sided thoracic back pain  -     pregabalin (LYRICA) 50 MG capsule; Take 1 Capsule by mouth at bedtime for 30 days.    Neuralgia  -     pregabalin (LYRICA) 50 MG capsule; Take 1 Capsule by mouth at bedtime for 30 days.    Annular tear of lumbar disc  -     pregabalin (LYRICA) 50 MG capsule; Take 1 Capsule by mouth at bedtime for 30 days.    Chronic bilateral low back pain without sciatica  -     pregabalin (LYRICA) 50 MG capsule; Take 1 Capsule by mouth at bedtime for 30 days.    Lumbar stenosis without neurogenic claudication  -     pregabalin (LYRICA) 50 MG capsule; Take 1 Capsule by mouth at bedtime for 30 days.    Left lumbar radiculitis  -     pregabalin (LYRICA) 50 MG capsule; Take 1 Capsule by mouth at bedtime for 30 days.  -     Pain Hospital Procedure; Future    Other chronic pain  -      pregabalin (LYRICA) 50 MG capsule; Take 1 Capsule by mouth at bedtime for 30 days.    Myalgia  -     Trigger Point Injections; Future    Facet arthropathy    Lumbar spondylosis              PLAN  Physical Therapy: cont PT. She has done over 3 months of PT     Diagnostic workup: Personally reviewed at today's visit:   Fluoroscopic images from 4/17/25 indicating successful needle placement for Medial Branch Radiofrequency neurotomy targeting the right and left L4-L5 and L5-S1 facet joint(s) with sedation.     Medications:   - OK to continue ibuprofen 200mg PRN which significantly helps. Patient would like to minimize medication intake.   -agree with discontinuation of gabapentin given GI side effects with bloating and constipation and cognitive side effects. Had pain relief x 1 weekwith 1200mg QHS.  It had previously improved her postradiation pain of the left torso and upper extremity  - discussed lyrica.  Reduce dose to 50 mg nightly given the similar degree of pain relief and less confusion than with 100mg QHS.  Refill today.  - I have discussed trial of Cymbalta versus Effexor, she would like to defer these at this time due to side effects that could occur with weaning this medication     Interventions:   - radiofrequency ablation of medial branch nerves targeting Bilateral L4-L5 and L5-S1 facet joints with sedation as needed  -Discussed left L5-S1 transforaminal epidural steroid injection under fluoroscopic guidance.  The risks, benefits, and alternatives to this procedure were discussed and the patient wishes to proceed with the procedure. Risks include but are not limited to damage to surrounding structures, infection, bleeding, worsening of pain which can be permanent, and weakness which can be permanent. Benefits include pain relief and improved function. Alternatives include not doing the procedure.    - Discussed trigger point injections under ultrasound guidance. The risks, benefits, and alternatives to  this procedure were discussed and the patient wishes to proceed with the procedure. Risks include but are not limited to damage to surrounding structures, infection, bleeding, worsening of pain which can be permanent, and weakness which can be permanent. Benefits include pain relief and improved function. Alternatives include not doing the procedure.    - Previously discussed consideration of peripheral nerve stimulator for left thoracic pain if medications are inadequate to control her pain and if the severity of her pain warrants     Other  - has done acupuncture with significant relief of left piriformis pain but no significant relief of low back pain most recently   - of note patient was on tamoxifen for 1 year. She went off of tamoxifen for 6 weeks starting in Nov, and during this time she had significant improvement in back pain for 2 weeks but unfortunately the pain worsened since. She notes hx of left foot pain that was thought to be an achilles injury but denies significant pain in all of her joints.  I have discussed that I would expect for tamoxifen to increase her pain more diffusely then only in her low back.  I shared this information with her oncology team    Follow-up: Next available for trigger point injections then in clinic 3 weeks after epidural    Orders Placed This [facility] Procedure    Trigger Point Injections    pregabalin (LYRICA) 50 MG capsule       Doris Nevarez MD  Interventional Pain and Spine  Physical Medicine and Rehabilitation  [facility]      The above note documents my personal evaluation of this patient. In addition, I have reviewed and confirmed with the patient and MA the supportive information documented in today's Patient Health Questionnaire and Office Note.     Please note that this dictation was created using voice recognition software. I have made every reasonable attempt to correct obvious errors, but I expect that there are errors of grammar  and possibly content that I did not discover before finalizing the note.

## 2025-05-20 NOTE — PROGRESS NOTES
Verbal consent was acquired by the patient to use [employer] ambient listening note generation during this visit Yes     Follow-up patient Note    Interventional Pain and Spine  Physiatry (Physical Medicine and Rehabilitation)     Patient Name: Evelyn Mullins  : 1975  Date of service: 2025    Chief Complaint:   Chief Complaint   Patient presents with    Follow-Up     4wk post SP       HISTORY FROM INITIAL VISIT (2025):     History of Present Illness  Evelyn Mullins is a 50-year-old female who presents for a new patient visit.     She reports experiencing back pain, which began in early  as left-sided discomfort, initially attributed to her piriformis muscle. The pain radiated down her leg, prompting her to seek physical therapy. Despite several months of therapy, her condition did not improve, leading her to consult with Dr. Maciel. A pelvic and lumbar MRI was performed, but no specific diagnosis was made. An injection was suggested, but she declined. Instead, she opted for acupuncture, which significantly alleviated her symptoms after three sessions. However, in , she began experiencing generalized back pain. In 2024, she sustained a left Achilles injury, which was managed by her physical therapist. As her Achilles injury improved, her lower back pain worsened, particularly with rotational movements. Her physical therapist identified instabilities in her lower lumbar facet joints or SI joint, and they have been focusing on core stability without significant progress.       Her movement patterns have been distorted due to her back issues. She reports no radiating pain down her right leg but feels discomfort in her hip. She has not been able to correlate her pain with any specific activities. She finds it difficult to rise from a chair and experiences pain upon waking up in the morning. Prolonged sitting followed by standing exacerbates her pain, but walking provides some  relief. She occasionally experiences tingling in her left leg, but it is less frequent and intense than when it first started in 2023. She reports no weakness, numbness, or tingling in her legs. She has tried acupuncture again this fall, focusing on her back, but it did not help.      She has been off tamoxifen for 6 weeks since 2024, during which she experienced a brief period of relief. She resumed tamoxifen at a lower dose in 2025 but discontinued it a week ago. Her oncologist suggested a possible link between tamoxifen and her symptoms, however she denies significant pain all over with resuming tamoxifen.     She reports constant discomfort at her left torso and upper extremity after surgery and radiation for breast cancer, which is present even at rest.     Her physical therapist at [facility] has been dry needling her lower lumbar and SI joint. She finds that consciously engaging her abs and standing up reduces her pain. She takes ibuprofen 200 mg as needed for pain relief. She has not tried gabapentin or duloxetine for her radiation-induced pain. She is considering Cymbalta as suggested by her primary care physician. She is not interested in taking Effexor due to possible side effects. She has a new MRI scheduled at [facility]. She reports no pregnancy, diabetes, pacemaker or wiring, or claustrophobia.     MEDICATIONS  Current: tamoxifen, ibuprofen    HPI  Today's visit   Evelyn Mullins ( 1975) is a female with The primary encounter diagnosis was Chronic left-sided thoracic back pain. Diagnoses of Neuralgia, Annular tear of lumbar disc, Chronic bilateral low back pain without sciatica, Lumbar stenosis without neurogenic claudication, Left lumbar radiculitis, Other chronic pain, Myalgia, Facet arthropathy, and Lumbar spondylosis were also pertinent to this visit.    Presents today after  25 Medial Branch Radiofrequency neurotomy targeting the right and left  L4-L5 and L5-S1 facet joint(s) with sedation.         History of Present Illness  The patient is a 50-year-old female who presents for follow-up.    Improved Mobility Post-Ablation  - Improved mobility post-ablation  - Severe bilateral gluteal and lower back soreness with radiating pain down left leg, worsening with sitting  - Pain described as sharp, stabbing  - No specific incident exacerbating pain    Increased Activity Level  - Increased activity level over past 3-4 weeks  - Evening and morning soreness, alleviated by movement  - Attends physical therapy 1-2 times/week  - Advised to engage core muscles but finds it challenging due to back pain  - Lacrosse ball, foam roller, and dry needling by PT exacerbated pain    Training for 100K Race  - Training for 100K race in August 2025  - Increased running and cycling distances  - Pain occurs post-activity  - Pre-ablation, could run 7 miles before pain; now can run 4 hours before pain onset    Medication  - Prescribed Lyrica 50 mg BID  - Beneficial but causes drowsiness and confusion, especially at bedtime  - Reduced dose to 50 mg at night, this improves the pain to the same degree as 100 mg nightly, with less drowsiness    Supplemental information: None    MEDICATIONS  - Lyrica    Pain severity 7/10 currently  Pt denies new numbness, tingling, or weakness.    Procedure history:  - 3/5/25 diagnostic lumbar medial branch blocks targeting the bilateral L4-L5 and L5-S1 facet joints -over 80% relief of index pain  - 3/27/25 diagnostic lumbar medial branch blocks targeting the bilateral L4-L5 and L5-S1 facet joints -over 80% relief of index pain  - 4/17/25 Medial Branch Radiofrequency neurotomy targeting the right and left L4-L5 and L5-S1 facet joint(s) with sedation.  Resolution of index pain on exam and at least 50% improvement in mobility and ability to perform ADLs such as transitioning from sit to standing        ROS:   Red Flags ROS:   Fever, Chills, Sweats:  Denies  Involuntary Weight Loss: Denies  Bladder Incontinence: Denies  Bowel Incontinence: denies  Saddle Anesthesia: Denies    All other systems reviewed and negative.     PMHx:   Past Medical History:   Diagnosis Date    Bronchitis     Cancer (HCC) 05/30/23    High cholesterol     Technically high but not being treated    Malignant neoplasm of upper-outer quadrant of left breast in female, estrogen receptor positive (HCC) 6/20/2023    Urinary incontinence     Sneezing, jumping       PSHx:   Past Surgical History:   Procedure Laterality Date    RADIO FREQUENCY ABLATION ADDITIONAL LEVEL Bilateral 4/17/2025    Procedure: RIGHT and LEFT radiofrequency neurotomies medial branch targeting the L4-5 and L5-S1 facet joints with fluoroscopic guidance and sedation…Note: Plan for 80 °C for 90 seconds for each neurotomy;  Surgeon: Doris Nevarez M.D.;  [facility];  Service: Pain Management    LUMBAR MEDIAL BRANCH BLOCKS Bilateral 3/27/2025    Procedure: Diagnostic medial branch blocks targeting the BILATERAL L4-5 and L5-S1 facet joints with fluoroscopic guidance #2;  Surgeon: Doris Nevarez M.D.;  Location: [facility];  Service: Pain Management    LUMBAR MEDIAL BRANCH BLOCKS Bilateral 3/5/2025    Procedure: Diagnostic medial branch blocks targeting the BILATERAL L4-5 and L5-S1 facet joints with fluoroscopic guidance #1;  Surgeon: Doris Nevarez M.D.;  Location: [facility];  Service: Pain Management    PB MASTECTOMY, PARTIAL Left 6/27/2023    Procedure: WIRE LOCALIZED LEFT PARTIAL MASTECTOMY, MASTOPEXY, LEFT SENTINEL LYMPH INJECTION WITH EXCISION OF AXILLARY NODES;  Surgeon: Antionette Au M.D.;  Location: SURGERY SAME DAY [facility];  Service: General    SC SUSPENSION OF BREAST Left 6/27/2023    Procedure: MASTOPEXY;  Surgeon: Antionette Au M.D.;  Location: SURGERY SAME DAY [facility];  Service: General    NODE BIOPSY SENTINEL Left 6/27/2023    Procedure:  BIOPSY, LYMPH NODE, SENTINEL;  Surgeon: Antionette Au M.D.;  Location: SURGERY SAME DAY [facility];  Service: General    NO PERTINENT PAST SURGICAL HISTORY         Family Hx:   History reviewed. No pertinent family history.    Social Hx:  Social History     Socioeconomic History    Marital status: Single     Spouse name: Not on file    Number of children: Not on file    Years of education: Not on file    Highest education level: Not on file   Occupational History    Not on file   Tobacco Use    Smoking status: Never     Passive exposure: Never    Smokeless tobacco: Never   Vaping Use    Vaping status: Never Used   Substance and Sexual Activity    Alcohol use: Not Currently    Drug use: Not Currently     Types: Inhaled     Comment: Tried marijuana a few times    Sexual activity: Not on file   Other Topics Concern    Not on file   Social History Narrative    Not on file     Social Drivers of Health     Financial Resource Strain: Not on file   Food Insecurity: Not on file   Transportation Needs: Not on file   Physical Activity: Not on file   Stress: Not on file   Social Connections: Not on file   Intimate Partner Violence: Not on file   Housing Stability: Not on file       Allergies:  Allergies   Allergen Reactions    Pcn [Penicillins] Anaphylaxis    Shellfish Allergy Swelling       Medications: reviewed on epic.   Outpatient Medications Marked as Taking for the 5/20/25 encounter (Office Visit) with Doris Nevarez M.D.   Medication Sig Dispense Refill    [START ON 5/29/2025] pregabalin (LYRICA) 50 MG capsule Take 1 Capsule by mouth at bedtime for 30 days. 30 Capsule 0    levothyroxine (SYNTHROID) 200 MCG Tab       tamoxifen (NOLVADEX) 10 MG Tab Take 0.5 Tablets by mouth every day. 90 Tablet 3    Omega-3 Fatty Acids (FISH OIL) 1000 MG Cap capsule Take 1,000 mg by mouth 3 times a day with meals.      EPSOLAY 5 % cream       Melatonin 1 MG/4ML Liquid       Probiotic Product (PROBIOTIC + TURMERIC EXTRACT) 400 MG Cap        "glutamine 500 mg/mL oral suspension       fluticasone (FLONASE) 50 MCG/ACT nasal spray Administer 1 Spray into affected nostril(S) every day.      VITAMIN D PO Take 2,000 Int'l Units by mouth every day.      MAGNESIUM PO Take 400 mg by mouth every day.      multivitamin Tab Take 1 Tablet by mouth every day.      Cetirizine HCl (ZYRTEC ALLERGY PO) Take 10 mg by mouth every day.          Current Outpatient Medications on File Prior to Visit   Medication Sig Dispense Refill    levothyroxine (SYNTHROID) 200 MCG Tab       tamoxifen (NOLVADEX) 10 MG Tab Take 0.5 Tablets by mouth every day. 90 Tablet 3    Omega-3 Fatty Acids (FISH OIL) 1000 MG Cap capsule Take 1,000 mg by mouth 3 times a day with meals.      EPSOLAY 5 % cream       Melatonin 1 MG/4ML Liquid       Probiotic Product (PROBIOTIC + TURMERIC EXTRACT) 400 MG Cap       glutamine 500 mg/mL oral suspension       fluticasone (FLONASE) 50 MCG/ACT nasal spray Administer 1 Spray into affected nostril(S) every day.      VITAMIN D PO Take 2,000 Int'l Units by mouth every day.      MAGNESIUM PO Take 400 mg by mouth every day.      multivitamin Tab Take 1 Tablet by mouth every day.      Cetirizine HCl (ZYRTEC ALLERGY PO) Take 10 mg by mouth every day.       No current facility-administered medications on file prior to visit.         EXAMINATION     Physical Exam:   /79   Pulse 75   Temp 36.2 °C (97.1 °F) (Temporal)   Ht 1.6 m (5' 3\")   Wt 63.7 kg (140 lb 6.9 oz)   SpO2 97%     Constitutional:   Body Habitus: Body mass index is 24.88 kg/m².  Cooperation: Fully cooperates with exam  Appearance: Well-groomed, well-nourished.    Eyes: No scleral icterus to suggest severe liver disease, no proptosis to suggest severe hyperthyroidism    ENT -no obvious auditory deficits, no noticeable facial droop     Skin -no rashes or lesions noted     Respiratory-  breathing comfortably on room air, no audible wheezing    Cardiovascular-distal extremities warm and well perfused.  No " lower extremity edema is noted.     Gastrointestinal - no obvious abdominal masses, non-distended    Psychiatric- alert and oriented ×3. Normal affect.     Gait - normal gait, no use of ambulatory device, nonantalgic.     Musculoskeletal and Neuro -      Thoracic/Lumbar Spine/Sacral Spine/Hips   Inspection: No evidence of atrophy in bilateral lower extremities throughout      Full active range of motion with lumbar extension     Facet loading maneuver negative bilaterally     Palpation:   No tenderness to palpation over the paraspinal muscles bilaterally, lumbar facets bilaterally   Tenderness to palpation over bilateral glutes overlying iliac crests    Lumbar spine /hip provocative exam maneuvers  SLR, NEVA, and FADIR negative bilaterally.  SLR on the left reproduces sensation of tightness in hamstring    Key points for the international standards for neurological classification of spinal cord injury (ISNCSCI) to light touch.   Dermatome R L   L2 2 2   L3 2 2   L4 2 2   L5 2 2   S1 2 2   S2 2 2         Motor Exam Lower Extremities  ? Myotome R L   Hip flexion L2 5 5   Knee extension L3 5 5   Ankle dorsiflexion L4 5 5   Toe extension L5 5 5   Ankle plantarflexion S1 5 5       previous exam  Reflexes  ?   R L   Patella   2+ 2+   Achilles    2+ 2+      Clonus of the ankle negative bilaterally          MEDICAL DECISION MAKING    Medical records review: see under HPI section.     DATA    Labs: No new labs available for review since last visit.   Lab Results   Component Value Date/Time    SODIUM 141 11/27/2024 05:22 AM    SODIUM 136 06/20/2023 03:40 PM    POTASSIUM 4.4 11/27/2024 05:22 AM    POTASSIUM 3.9 06/20/2023 03:40 PM    CHLORIDE 109 (H) 11/27/2024 05:22 AM    CHLORIDE 102 06/20/2023 03:40 PM    CO2 20 11/27/2024 05:22 AM    CO2 25 06/20/2023 03:40 PM    ANION 9.0 06/20/2023 03:40 PM    GLUCOSE 86 11/27/2024 05:22 AM    GLUCOSE 83 06/20/2023 03:40 PM    BUN 22 11/27/2024 05:22 AM    BUN 18 06/20/2023 03:40 PM     "CREATININE 0.99 11/27/2024 05:22 AM    CREATININE 0.96 06/20/2023 03:40 PM    CALCIUM 9.4 11/27/2024 05:22 AM    CALCIUM 9.7 06/20/2023 03:40 PM    ASTSGOT 22 11/27/2024 05:22 AM    ASTSGOT 20 11/22/2014 08:09 AM    ALTSGPT 15 11/27/2024 05:22 AM    ALTSGPT 15 11/22/2014 08:09 AM    TBILIRUBIN 0.3 11/27/2024 05:22 AM    TBILIRUBIN 0.4 11/22/2014 08:09 AM    ALBUMIN 4.2 11/27/2024 05:22 AM    ALBUMIN 4.3 11/22/2014 08:09 AM    TOTPROTEIN 6.5 11/27/2024 05:22 AM    TOTPROTEIN 7.2 11/22/2014 08:09 AM    GLOBULIN 2.3 11/27/2024 05:22 AM    GLOBULIN 2.9 11/22/2014 08:09 AM    AGRATIO 1.5 11/22/2014 08:09 AM       No results found for: \"PROTHROMBTM\", \"INR\"     Lab Results   Component Value Date/Time    WBC 5.4 12/24/2024 04:15 AM    WBC 9.5 06/20/2023 03:40 PM    RBC 4.19 12/24/2024 04:15 AM    RBC 3.80 (L) 06/20/2023 03:40 PM    HEMOGLOBIN 13.6 12/24/2024 04:15 AM    HEMOGLOBIN 12.8 06/20/2023 03:40 PM    HEMATOCRIT 41.3 12/24/2024 04:15 AM    HEMATOCRIT 38.4 06/20/2023 03:40 PM    MCV 99 (H) 12/24/2024 04:15 AM    .1 (H) 06/20/2023 03:40 PM    MCH 32.5 12/24/2024 04:15 AM    MCH 33.7 (H) 06/20/2023 03:40 PM    MCHC 32.9 12/24/2024 04:15 AM    MCHC 33.3 06/20/2023 03:40 PM    MPV 10.3 06/20/2023 03:40 PM    NEUTSPOLYS 39 12/24/2024 04:15 AM    NEUTSPOLYS 65.30 06/20/2023 03:40 PM    LYMPHOCYTES 45 12/24/2024 04:15 AM    LYMPHOCYTES 25.40 06/20/2023 03:40 PM    MONOCYTES 7 12/24/2024 04:15 AM    MONOCYTES 5.20 06/20/2023 03:40 PM    EOSINOPHILS 8 12/24/2024 04:15 AM    EOSINOPHILS 3.40 06/20/2023 03:40 PM    BASOPHILS 1 12/24/2024 04:15 AM    BASOPHILS 0.50 06/20/2023 03:40 PM        No results found for: \"HBA1C\"     Imaging:   I personally reviewed following images, these are my reads  MRI lumbar spine 10/19/2023  Annular tear at L5-S1.  Moderate central canal stenosis at L4-5.  Mild central canal stenosis at L3-4.  At L2-3 and L3-4 and L4-5 and L5-S1 there is mild bilateral neuroforaminal stenosis.  Facet joint " synovitis at bilateral L4-5.  Facet arthropathy at bilateral L5-S1. See formal radiology report for further details.     MRI lumbar spine 10/19/2023  No evidence of sacroiliitis.    MRI lumbar spine 2/26/2025  At L4-5 there is moderate central canal stenosis and mild bilateral neuroforaminal stenosis worse on the right.  At L5-S1 there is mild bilateral neuroforaminal stenosis.  Annular tear at L5-S1.  Facet arthropathy at multiple levels most severe at bilateral L4-5. See formal radiology report for further details.      IMAGING radiology reads. I reviewed the following radiology reads                      Results for orders placed during the hospital encounter of 10/19/23    MR-LUMBAR SPINE-W/O    Impression  1.  Multilevel degenerative disc disease and facet degeneration. There is moderate central canal narrowing at L4-5, mild central canal narrowing at L3-4 and borderline central canal narrowing at L2-3.    2.  Mild multilevel neural foraminal narrowing as specifically described above.    3.  Mild multilevel degenerative subluxation.        Results for orders placed during the hospital encounter of 10/19/23    MR-LUMBAR SPINE-W/O    Impression  1.  Multilevel degenerative disc disease and facet degeneration. There is moderate central canal narrowing at L4-5, mild central canal narrowing at L3-4 and borderline central canal narrowing at L2-3.    2.  Mild multilevel neural foraminal narrowing as specifically described above.    3.  Mild multilevel degenerative subluxation.               Results for orders placed during the hospital encounter of 10/19/23    MR-PELVIS W/O    Impression  1.  No evidence of marrow edema or arthropathy of the hip articulations.    2.  No evidence of sacroiliitis.    3.  No evidence of muscle or tendon injury.    4.  Osteitis symphysis pubis.                              Results for orders placed in visit on 06/07/21    DX-CLAVICLE LEFT                                             Diagnosis  Visit Diagnoses     ICD-10-CM   1. Chronic left-sided thoracic back pain  M54.6    G89.29   2. Neuralgia  M79.2   3. Annular tear of lumbar disc  M51.369   4. Chronic bilateral low back pain without sciatica  M54.50    G89.29   5. Lumbar stenosis without neurogenic claudication  M48.061   6. Left lumbar radiculitis  M54.16   7. Other chronic pain  G89.29   8. Myalgia  M79.10   9. Facet arthropathy  M47.819   10. Lumbar spondylosis  M47.816             ASSESSMENT AND PLAN:  Evelyn Mullins (: 1975) is a female who initially presented with bilateral low back pain that appeared to be facetogenic bilaterally, reproduced with positive facet loading maneuver and with facet arthropathy seen on MRI lumbar spine corresponding to her areas of pain, now improved after bilateral lumbar medial branch RFA.      Now with worsening radicular symptoms in left leg in L5 dermatomal distribution likely secondary to left lumbar radiculitis     Also with left sided thoracic/UE pain after radiation for left breast cancer     Boogie was seen today for follow-up.    Diagnoses and all orders for this visit:    Chronic left-sided thoracic back pain  -     pregabalin (LYRICA) 50 MG capsule; Take 1 Capsule by mouth at bedtime for 30 days.    Neuralgia  -     pregabalin (LYRICA) 50 MG capsule; Take 1 Capsule by mouth at bedtime for 30 days.    Annular tear of lumbar disc  -     pregabalin (LYRICA) 50 MG capsule; Take 1 Capsule by mouth at bedtime for 30 days.    Chronic bilateral low back pain without sciatica  -     pregabalin (LYRICA) 50 MG capsule; Take 1 Capsule by mouth at bedtime for 30 days.    Lumbar stenosis without neurogenic claudication  -     pregabalin (LYRICA) 50 MG capsule; Take 1 Capsule by mouth at bedtime for 30 days.    Left lumbar radiculitis  -     pregabalin (LYRICA) 50 MG capsule; Take 1 Capsule by mouth at bedtime for 30 days.  -     Pain Hospital Procedure; Future    Other chronic pain  -      pregabalin (LYRICA) 50 MG capsule; Take 1 Capsule by mouth at bedtime for 30 days.    Myalgia  -     Trigger Point Injections; Future    Facet arthropathy    Lumbar spondylosis              PLAN  Physical Therapy: cont PT. She has done over 3 months of PT     Diagnostic workup: Personally reviewed at today's visit:   Fluoroscopic images from 4/17/25 indicating successful needle placement for Medial Branch Radiofrequency neurotomy targeting the right and left L4-L5 and L5-S1 facet joint(s) with sedation.     Medications:   - OK to continue ibuprofen 200mg PRN which significantly helps. Patient would like to minimize medication intake.   -agree with discontinuation of gabapentin given GI side effects with bloating and constipation and cognitive side effects. Had pain relief x 1 weekwith 1200mg QHS.  It had previously improved her postradiation pain of the left torso and upper extremity  - discussed lyrica.  Reduce dose to 50 mg nightly given the similar degree of pain relief and less confusion than with 100mg QHS.  Refill today.  - I have discussed trial of Cymbalta versus Effexor, she would like to defer these at this time due to side effects that could occur with weaning this medication     Interventions:   - radiofrequency ablation of medial branch nerves targeting Bilateral L4-L5 and L5-S1 facet joints with sedation as needed  -Discussed left L5-S1 transforaminal epidural steroid injection under fluoroscopic guidance.  The risks, benefits, and alternatives to this procedure were discussed and the patient wishes to proceed with the procedure. Risks include but are not limited to damage to surrounding structures, infection, bleeding, worsening of pain which can be permanent, and weakness which can be permanent. Benefits include pain relief and improved function. Alternatives include not doing the procedure.    - Discussed trigger point injections under ultrasound guidance. The risks, benefits, and alternatives to  this procedure were discussed and the patient wishes to proceed with the procedure. Risks include but are not limited to damage to surrounding structures, infection, bleeding, worsening of pain which can be permanent, and weakness which can be permanent. Benefits include pain relief and improved function. Alternatives include not doing the procedure.    - Previously discussed consideration of peripheral nerve stimulator for left thoracic pain if medications are inadequate to control her pain and if the severity of her pain warrants     Other  - has done acupuncture with significant relief of left piriformis pain but no significant relief of low back pain most recently   - of note patient was on tamoxifen for 1 year. She went off of tamoxifen for 6 weeks starting in Nov, and during this time she had significant improvement in back pain for 2 weeks but unfortunately the pain worsened since. She notes hx of left foot pain that was thought to be an achilles injury but denies significant pain in all of her joints.  I have discussed that I would expect for tamoxifen to increase her pain more diffusely then only in her low back.  I shared this information with her oncology team    Follow-up: Next available for trigger point injections then in clinic 3 weeks after epidural    Orders Placed This [facility] Procedure    Trigger Point Injections    pregabalin (LYRICA) 50 MG capsule       Doris Nevarez MD  Interventional Pain and Spine  Physical Medicine and Rehabilitation  [facility]      The above note documents my personal evaluation of this patient. In addition, I have reviewed and confirmed with the patient and MA the supportive information documented in today's Patient Health Questionnaire and Office Note.     Please note that this dictation was created using voice recognition software. I have made every reasonable attempt to correct obvious errors, but I expect that there are errors of grammar  and possibly content that I did not discover before finalizing the note.

## 2025-05-21 NOTE — Clinical Note
REFERRAL APPROVAL NOTICE         Sent on May 21, 2025                   Boogie Mullins  [address] [place] [address] [place] [address]                   Dear MsNguyen Susanna,    After a careful review of the medical information and benefit coverage, [facility] has processed your referral. See below for additional details.    If applicable, you must be actively enrolled with your insurance for coverage of the authorized service. If you have any questions regarding your coverage, please contact your insurance directly.    REFERRAL INFORMATION   Referral #:  [id number]  Referred-To Department    Referred-By Provider:  Pain Management    Doris Nevarez M.D.   Pain Management       [address]  [address]  [place] [address]  [phone] [address] [place] [address]  [place] [address]  [phone]    Referral Start Date:  05/20/2025  Referral End Date:   08/21/2025             SCHEDULING  If you do not already have an appointment, please call [phone] to make an appointment.     MORE INFORMATION  If you do not already have a [facility] account, sign up at: [email or web address]  You can access your medical information, make appointments, see lab results, billing information, and more.  If you have questions regarding this referral, please contact  the [facility] Referrals department at:             [phone]. Monday - Friday 8:00AM - 5:00PM.     Sincerely,    [facility]

## 2025-05-22 ENCOUNTER — APPOINTMENT (OUTPATIENT)
Dept: RADIOLOGY | Facility: REHABILITATION | Age: 50
End: 2025-05-22
Attending: STUDENT IN AN ORGANIZED HEALTH CARE EDUCATION/TRAINING PROGRAM
Payer: COMMERCIAL

## 2025-05-22 ENCOUNTER — HOSPITAL ENCOUNTER (OUTPATIENT)
Facility: REHABILITATION | Age: 50
End: 2025-05-22
Attending: STUDENT IN AN ORGANIZED HEALTH CARE EDUCATION/TRAINING PROGRAM | Admitting: STUDENT IN AN ORGANIZED HEALTH CARE EDUCATION/TRAINING PROGRAM
Payer: COMMERCIAL

## 2025-05-22 VITALS
HEART RATE: 72 BPM | SYSTOLIC BLOOD PRESSURE: 126 MMHG | BODY MASS INDEX: 25.2 KG/M2 | HEIGHT: 63 IN | DIASTOLIC BLOOD PRESSURE: 81 MMHG | OXYGEN SATURATION: 99 % | RESPIRATION RATE: 16 BRPM | TEMPERATURE: 97.5 F | WEIGHT: 142.2 LBS

## 2025-05-22 DIAGNOSIS — Z17.0 MALIGNANT NEOPLASM OF UPPER-OUTER QUADRANT OF LEFT BREAST IN FEMALE, ESTROGEN RECEPTOR POSITIVE (HCC): ICD-10-CM

## 2025-05-22 DIAGNOSIS — C50.412 MALIGNANT NEOPLASM OF UPPER-OUTER QUADRANT OF LEFT BREAST IN FEMALE, ESTROGEN RECEPTOR POSITIVE (HCC): ICD-10-CM

## 2025-05-22 DIAGNOSIS — Z12.31 SCREENING MAMMOGRAM, ENCOUNTER FOR: Primary | ICD-10-CM

## 2025-05-22 DIAGNOSIS — Z12.31 ENCOUNTER FOR SCREENING MAMMOGRAM FOR MALIGNANT NEOPLASM OF BREAST: ICD-10-CM

## 2025-05-22 PROCEDURE — 64483 NJX AA&/STRD TFRM EPI L/S 1: CPT

## 2025-05-22 PROCEDURE — 700111 HCHG RX REV CODE 636 W/ 250 OVERRIDE (IP): Mod: JZ

## 2025-05-22 PROCEDURE — A9579 GAD-BASE MR CONTRAST NOS,1ML: HCPCS | Mod: JZ

## 2025-05-22 PROCEDURE — 700117 HCHG RX CONTRAST REV CODE 255: Mod: JZ

## 2025-05-22 RX ORDER — LIDOCAINE HYDROCHLORIDE 10 MG/ML
INJECTION, SOLUTION EPIDURAL; INFILTRATION; INTRACAUDAL; PERINEURAL
Status: COMPLETED
Start: 2025-05-22 | End: 2025-05-22

## 2025-05-22 RX ORDER — DEXAMETHASONE SODIUM PHOSPHATE 10 MG/ML
INJECTION, SOLUTION INTRAMUSCULAR; INTRAVENOUS
Status: COMPLETED
Start: 2025-05-22 | End: 2025-05-22

## 2025-05-22 RX ADMIN — GADOTERIDOL 3 ML: 279.3 INJECTION, SOLUTION INTRAVENOUS at 08:20

## 2025-05-22 RX ADMIN — LIDOCAINE HYDROCHLORIDE 10 ML: 10 INJECTION, SOLUTION EPIDURAL; INFILTRATION; INTRACAUDAL; PERINEURAL at 08:20

## 2025-05-22 RX ADMIN — DEXAMETHASONE SODIUM PHOSPHATE 10 MG: 10 INJECTION, SOLUTION INTRAMUSCULAR; INTRAVENOUS at 08:20

## 2025-05-22 ASSESSMENT — PAIN DESCRIPTION - PAIN TYPE
TYPE: CHRONIC PAIN
TYPE: CHRONIC PAIN

## 2025-05-22 ASSESSMENT — FIBROSIS 4 INDEX: FIB4 SCORE: 0.94

## 2025-05-22 NOTE — OP REPORT
Date of Service: 5/22/2025     Patient: Evelyn Mullins 50 y.o. female     MRN: [id number]     Physician/s: Doris Nevarez MD    Pre-operative Diagnosis: Lumbar radiculopathy    Post-operative Diagnosis: Lumbar radiculopathy    Procedure: Lumbar Transforaminal Epidural Steroid Injection at the  left  L5-S1 levels.     Description of procedure:    The risks, benefits, and alternatives of the procedure were reviewed and discussed with the patient.  Written informed consent was freely obtained. A pre-procedural time-out was conducted by the physician verifying patient’s identity, procedure to be performed, procedure site and side, and allergy verification. Appropriate equipment was determined to be in place for the procedure.     The patient's vital signs were carefully monitored before, throughout, and after the procedure.     In the fluoroscopy suite the patient was placed in a prone position, a pillow placed underneath their umbilicus. The skin was prepped and draped in the usual sterile fashion.     The fluoroscope was placed over the lumbar spine and adjusted into the proper AP/Oblique view to enter the transforaminal space just adjacent to the pedicle at the levels below. The targets for injection were then marked at the left L5-S1. A 27g 1.5 inch needle was placed into the marked site, and approximately 1mL of 1% Lidocaine was injected subcutaneously into the epidermal and dermal layers. The needle was removed intact.  A 25g 3.5 inch spinal needle was then placed and advanced under fluoroscopic guidance in an oblique view towards the epidural space of the levels noted above. The needle position was confirmed to not be past the 6 o'clock position in the AP view and it was in the neuroforamen in the lateral view.       Under live fluoroscopic guidance in the AP view, contrast dye was used to highlight the epidural space spread of each level above. Final fluoroscopic images were saved.  Following negative  aspiration, 1mL of 1% lidocaine preservative free with 1mL of 10mg/mL of dexamethasone was then injected at each level above, and the needles were removed intact after being restyleted. The patient's back was covered with a 4x4 gauze, the area was cleansed with sterile normal saline, and a dressing was applied. There were no complications noted.     The patient was then evaluated post-procedure, and was hemodynamically stable prior to leaving the post-operative care unit.     Follow-up as scheduled    Doris Nevarez MD  Interventional Pain and Spine  Physical Medicine and Rehabilitation  [facility]    Pain score prior to injection: 6/10 on NRS  Pain score immediately after injection: 3/10 on NRS  50% pain relief was achieved immediately after the injection.    CPT codes  Transforaminal epidural injection- lumbar or sacral (first level):  95540

## 2025-05-22 NOTE — PROGRESS NOTES
Patient contacted office in regard to mammogram orders and needing clarification. Due to new symptoms, patient had a diagnostic mammogram in January. Per Dr. Womack, patient is able to get a screening mammogram for annual exam. Order placed. Patient updated via [employer] message.

## 2025-05-28 ENCOUNTER — OFFICE VISIT (OUTPATIENT)
Dept: PHYSICAL MEDICINE AND REHAB | Facility: MEDICAL CENTER | Age: 50
End: 2025-05-28
Payer: COMMERCIAL

## 2025-05-28 VITALS
TEMPERATURE: 97.2 F | OXYGEN SATURATION: 99 % | WEIGHT: 142.2 LBS | HEIGHT: 63 IN | BODY MASS INDEX: 25.2 KG/M2 | DIASTOLIC BLOOD PRESSURE: 80 MMHG | HEART RATE: 76 BPM | SYSTOLIC BLOOD PRESSURE: 100 MMHG

## 2025-05-28 DIAGNOSIS — M79.2 NEURALGIA: ICD-10-CM

## 2025-05-28 DIAGNOSIS — M54.50 CHRONIC BILATERAL LOW BACK PAIN WITHOUT SCIATICA: ICD-10-CM

## 2025-05-28 DIAGNOSIS — G89.29 OTHER CHRONIC PAIN: ICD-10-CM

## 2025-05-28 DIAGNOSIS — M54.6 CHRONIC LEFT-SIDED THORACIC BACK PAIN: Primary | ICD-10-CM

## 2025-05-28 DIAGNOSIS — M79.10 MYALGIA: ICD-10-CM

## 2025-05-28 DIAGNOSIS — M47.819 FACET ARTHROPATHY: ICD-10-CM

## 2025-05-28 DIAGNOSIS — G89.29 CHRONIC BILATERAL LOW BACK PAIN WITHOUT SCIATICA: ICD-10-CM

## 2025-05-28 DIAGNOSIS — M51.369 ANNULAR TEAR OF LUMBAR DISC: ICD-10-CM

## 2025-05-28 DIAGNOSIS — M54.16 LEFT LUMBAR RADICULITIS: ICD-10-CM

## 2025-05-28 DIAGNOSIS — G89.29 CHRONIC LEFT-SIDED THORACIC BACK PAIN: Primary | ICD-10-CM

## 2025-05-28 DIAGNOSIS — M48.061 LUMBAR STENOSIS WITHOUT NEUROGENIC CLAUDICATION: ICD-10-CM

## 2025-05-28 DIAGNOSIS — M47.816 LUMBAR SPONDYLOSIS: ICD-10-CM

## 2025-05-28 RX ORDER — BUPIVACAINE HYDROCHLORIDE 5 MG/ML
5 INJECTION, SOLUTION EPIDURAL; INTRACAUDAL; PERINEURAL ONCE
Status: COMPLETED | OUTPATIENT
Start: 2025-05-28 | End: 2025-05-28

## 2025-05-28 RX ADMIN — BUPIVACAINE HYDROCHLORIDE 5 ML: 5 INJECTION, SOLUTION EPIDURAL; INTRACAUDAL; PERINEURAL at 10:56

## 2025-05-28 RX ADMIN — Medication 5 ML: at 10:55

## 2025-05-28 ASSESSMENT — FIBROSIS 4 INDEX: FIB4 SCORE: 0.94

## 2025-05-28 ASSESSMENT — PAIN SCALES - GENERAL: PAINLEVEL_OUTOF10: 6=MODERATE PAIN

## 2025-05-28 NOTE — PROCEDURES
Patient Name: Evelyn Mullins  : 1975  Date of Service: 2025    Physician/s: Doris Nevarez MD    Pre-operative Diagnosis: Myalgia (M79.1)    Post-operative Diagnosis: Myalgia (M79.1)    Procedure: trigger point injections of the following muscles:    Site R L   Splenius capitis     Semispinalis capitis     Splenius cervicis     Sternocleidomastoid     Upper trapezius     Levator scapulae     Rhomboids     Pectoralis minor     Pectoralis major     Serratus anterior     Supraspinatus     Infraspinatus     Teres minor     Teres major     Quadratus lumborum     Latissimus dorsi     Piriformis  x   Paravertebral, cervical     Paravertebral, thoracic     Paravertebral, lumbar     Gluteus virgil x x   Gluteus medius     Gluteus minimus     Tensor fascia paul     Vastus lateralis     Adductor marguerite     Adductor longus     Occipitalis     Cervical paraspinal     Trapezius, mid     Trapezius, lower       Description of procedure:    The risks, benefits, and alternatives of the procedure were reviewed and discussed with the patient.  Written informed consent was freely obtained. A pre-procedural time-out was conducted by the physician verifying patient’s identity, procedure to be performed, procedure site and side, and allergy verification. Appropriate equipment was determined to be in place for the procedure.     In the office suite exam room the patient was placed in a prone position and the skin areas for injection over the above muscles were marked. The areas of pain were then prepped and draped in the usual sterile fashion. A solution was prepared with 5 mL of 1% lidocaine and 5 mL of 0.5% bupivacaine. Ultrasound was confirmed to view the adjacent structures for blood vessels and nerves and to confirm the needle path was not within the structures. A 27g needle was placed into each of the markings at the areas above under ultrasound guidance with an out of plane approach. After negative aspiration,  approximately 0.8-1.5 mL of the above solution was injected. The needle was removed intact after each trigger point injection.    An ultrasound probe was placed over the left buttocks to outline the sacrum in a line to the greater trochanter to identify the piriformis muscle. The sciatic nerve was also identified with the adjacent vasculature.  A 25g 3.5 inch needle was placed into skin and advanced under ultrasound guidance into the piriformis muscle and following negative aspiration,  a solution containing 5 mL of 1% lidocaine was then injected adjacent to the sciatic nerve. The needle was removed intact.  The patient's buttocks was wiped with a 4x4 gauze, the area was cleansed with alcohol prep, and a bandaid was applied. There were no complications noted.       Doris Nevarez MD  Interventional Pain and Spine  Physical Medicine and Rehabilitation  [facility]

## 2025-05-28 NOTE — PROGRESS NOTES
Verbal consent was acquired by the patient to use [employer] ambient listening note generation during this visit Yes     Follow-up patient Note    Interventional Pain and Spine  Physiatry (Physical Medicine and Rehabilitation)     Patient Name: Evelyn Mullins  : 1975  Date of service: 2025    Chief Complaint:   Chief Complaint   Patient presents with    Injections     TPI       HISTORY FROM INITIAL VISIT (2025):     History of Present Illness  Evelyn Mullins is a 50-year-old female who presents for a new patient visit.     She reports experiencing back pain, which began in early  as left-sided discomfort, initially attributed to her piriformis muscle. The pain radiated down her leg, prompting her to seek physical therapy. Despite several months of therapy, her condition did not improve, leading her to consult with Dr. Maciel. A pelvic and lumbar MRI was performed, but no specific diagnosis was made. An injection was suggested, but she declined. Instead, she opted for acupuncture, which significantly alleviated her symptoms after three sessions. However, in , she began experiencing generalized back pain. In 2024, she sustained a left Achilles injury, which was managed by her physical therapist. As her Achilles injury improved, her lower back pain worsened, particularly with rotational movements. Her physical therapist identified instabilities in her lower lumbar facet joints or SI joint, and they have been focusing on core stability without significant progress.       Her movement patterns have been distorted due to her back issues. She reports no radiating pain down her right leg but feels discomfort in her hip. She has not been able to correlate her pain with any specific activities. She finds it difficult to rise from a chair and experiences pain upon waking up in the morning. Prolonged sitting followed by standing exacerbates her pain, but walking provides some relief.  She occasionally experiences tingling in her left leg, but it is less frequent and intense than when it first started in 2023. She reports no weakness, numbness, or tingling in her legs. She has tried acupuncture again this fall, focusing on her back, but it did not help.      She has been off tamoxifen for 6 weeks since 2024, during which she experienced a brief period of relief. She resumed tamoxifen at a lower dose in 2025 but discontinued it a week ago. Her oncologist suggested a possible link between tamoxifen and her symptoms, however she denies significant pain all over with resuming tamoxifen.     She reports constant discomfort at her left torso and upper extremity after surgery and radiation for breast cancer, which is present even at rest.     Her physical therapist at [facility] has been dry needling her lower lumbar and SI joint. She finds that consciously engaging her abs and standing up reduces her pain. She takes ibuprofen 200 mg as needed for pain relief. She has not tried gabapentin or duloxetine for her radiation-induced pain. She is considering Cymbalta as suggested by her primary care physician. She is not interested in taking Effexor due to possible side effects. She has a new MRI scheduled at [facility]. She reports no pregnancy, diabetes, pacemaker or wiring, or claustrophobia.     MEDICATIONS  Current: tamoxifen, ibuprofen    HPI  Today's visit   Evelyn Mullins ( 1975) is a female with The primary encounter diagnosis was Chronic left-sided thoracic back pain. Diagnoses of Myalgia, Neuralgia, Annular tear of lumbar disc, Chronic bilateral low back pain without sciatica, Lumbar stenosis without neurogenic claudication, Left lumbar radiculitis, Other chronic pain, Facet arthropathy, and Lumbar spondylosis were also pertinent to this visit.    Presents today after  25  Lumbar Transforaminal Epidural Steroid Injection at the  left  L5-S1 levels.          History of Present Illness  The patient presents for evaluation of left leg pain.    Left Leg Pain  - Reports significant improvement post-injection 2 days ago, but discomfort upon awakening for the past 2 mornings.  - Describes needle-like sensation on the second day post-injection, which subsided.  - Injection provided relief but highlighted tightness severity on the contralateral side.  - Pain when twisting, extending down the leg, with a knot in the hamstring and tension over the sciatic nerve.    Pain severity 6/10 currently  Pt denies new numbness, tingling, or weakness.    Procedure history:  - 3/5/25 diagnostic lumbar medial branch blocks targeting the bilateral L4-L5 and L5-S1 facet joints -over 80% relief of index pain  - 3/27/25 diagnostic lumbar medial branch blocks targeting the bilateral L4-L5 and L5-S1 facet joints -over 80% relief of index pain  - 4/17/25 Medial Branch Radiofrequency neurotomy targeting the right and left L4-L5 and L5-S1 facet joint(s) with sedation.  Resolution of index pain on exam and at least 50% improvement in mobility and ability to perform ADLs such as transitioning from sit to standing  - 5/22/25  Lumbar Transforaminal Epidural Steroid Injection at the  left  L5-S1 levels. 2 days of relief as of 05/28/25       ROS:   Red Flags ROS:   Fever, Chills, Sweats: Denies  Involuntary Weight Loss: Denies  Bladder Incontinence: Denies  Bowel Incontinence: denies  Saddle Anesthesia: Denies    All other systems reviewed and negative.     PMHx:   Past Medical History:   Diagnosis Date    Bronchitis     Cancer (HCC) 05/30/23    High cholesterol     Technically high but not being treated    Malignant neoplasm of upper-outer quadrant of left breast in female, estrogen receptor positive (HCC) 6/20/2023    Urinary incontinence     Sneezing, jumping       PSHx:   Past Surgical History:   Procedure Laterality Date    PA NJX AA&/STRD TFRML EPI LUMBAR/SACRAL 1 LEVEL Left 5/22/2025     Procedure: LEFT L5-S1 transforaminal epidural steroid injection with fluoroscopic guidance;  Surgeon: Doris Nevarez M.D.;  [facility];  Service: Pain Management    RADIO FREQUENCY ABLATION ADDITIONAL LEVEL Bilateral 4/17/2025    Procedure: RIGHT and LEFT radiofrequency neurotomies medial branch targeting the L4-5 and L5-S1 facet joints with fluoroscopic guidance and sedation…Note: Plan for 80 °C for 90 seconds for each neurotomy;  Surgeon: Doris Nevarez M.D.;  Location: [facility];  Service: Pain Management    LUMBAR MEDIAL BRANCH BLOCKS Bilateral 3/27/2025    Procedure: Diagnostic medial branch blocks targeting the BILATERAL L4-5 and L5-S1 facet joints with fluoroscopic guidance #2;  Surgeon: Doris Nevarez M.D.;  Location: [facility];  Service: Pain Management    LUMBAR MEDIAL BRANCH BLOCKS Bilateral 3/5/2025    Procedure: Diagnostic medial branch blocks targeting the BILATERAL L4-5 and L5-S1 facet joints with fluoroscopic guidance #1;  Surgeon: Doris Nevarez M.D.;  Location: [facility];  Service: Pain Management    PB MASTECTOMY, PARTIAL Left 6/27/2023    Procedure: WIRE LOCALIZED LEFT PARTIAL MASTECTOMY, MASTOPEXY, LEFT SENTINEL LYMPH INJECTION WITH EXCISION OF AXILLARY NODES;  Surgeon: Antionette Au M.D.;  Location: [facility]ERY [facility];  Service: General    AZ SUSPENSION OF BREAST Left 6/27/2023    Procedure: MASTOPEXY;  Surgeon: Antionette Au M.D.;  Location: SUR[facility]ERY [facility];  Service: General    NODE BIOPSY SENTINEL Left 6/27/2023    Procedure: BIOPSY, LYMPH NODE, SENTINEL;  Surgeon: Antionette Au M.D.;  Location: SURGERY SAME DAY [facility];  Service: General    NO PERTINENT PAST SURGICAL HISTORY         Family Hx:   History reviewed. No pertinent family history.    Social Hx:  Social History     Socioeconomic History    Marital status: Single     Spouse name: Not on file    Number of children:  Not on file    Years of education: Not on file    Highest education level: Not on file   Occupational History    Not on file   Tobacco Use    Smoking status: Never     Passive exposure: Never    Smokeless tobacco: Never   Vaping Use    Vaping status: Never Used   Substance and Sexual Activity    Alcohol use: Not Currently    Drug use: Not Currently     Types: Inhaled     Comment: Tried marijuana a few times    Sexual activity: Not on file   Other Topics Concern    Not on file   Social History Narrative    Not on file     Social Drivers of Health     Financial Resource Strain: Not on file   Food Insecurity: Not on file   Transportation Needs: Not on file   Physical Activity: Not on file   Stress: Not on file   Social Connections: Not on file   Intimate Partner Violence: Not on file   Housing Stability: Not on file       Allergies:  Allergies   Allergen Reactions    Pcn [Penicillins] Anaphylaxis    Shellfish Allergy Swelling       Medications: reviewed on epic.   Outpatient Medications Marked as Taking for the 5/28/25 encounter (Office Visit) with Doris Nevarez M.D.   Medication Sig Dispense Refill    levothyroxine (SYNTHROID) 200 MCG Tab       tamoxifen (NOLVADEX) 10 MG Tab Take 0.5 Tablets by mouth every day. 90 Tablet 3    Omega-3 Fatty Acids (FISH OIL) 1000 MG Cap capsule Take 1,000 mg by mouth 3 times a day with meals.      EPSOLAY 5 % cream       Melatonin 1 MG/4ML Liquid       Probiotic Product (PROBIOTIC + TURMERIC EXTRACT) 400 MG Cap       glutamine 500 mg/mL oral suspension       fluticasone (FLONASE) 50 MCG/ACT nasal spray Administer 1 Spray into affected nostril(S) every day.      VITAMIN D PO Take 2,000 Int'l Units by mouth every day.      MAGNESIUM PO Take 400 mg by mouth every day.      multivitamin Tab Take 1 Tablet by mouth every day.      Cetirizine HCl (ZYRTEC ALLERGY PO) Take 10 mg by mouth every day.          Current Outpatient Medications on File Prior to Visit   Medication Sig Dispense Refill  "   levothyroxine (SYNTHROID) 200 MCG Tab       tamoxifen (NOLVADEX) 10 MG Tab Take 0.5 Tablets by mouth every day. 90 Tablet 3    Omega-3 Fatty Acids (FISH OIL) 1000 MG Cap capsule Take 1,000 mg by mouth 3 times a day with meals.      EPSOLAY 5 % cream       Melatonin 1 MG/4ML Liquid       Probiotic Product (PROBIOTIC + TURMERIC EXTRACT) 400 MG Cap       glutamine 500 mg/mL oral suspension       fluticasone (FLONASE) 50 MCG/ACT nasal spray Administer 1 Spray into affected nostril(S) every day.      VITAMIN D PO Take 2,000 Int'l Units by mouth every day.      MAGNESIUM PO Take 400 mg by mouth every day.      multivitamin Tab Take 1 Tablet by mouth every day.      Cetirizine HCl (ZYRTEC ALLERGY PO) Take 10 mg by mouth every day.      [START ON 5/29/2025] pregabalin (LYRICA) 50 MG capsule Take 1 Capsule by mouth at bedtime for 30 days. 30 Capsule 0     No current facility-administered medications on file prior to visit.         EXAMINATION     Physical Exam:   /80   Pulse 76   Temp 36.2 °C (97.2 °F) (Temporal)   Ht 1.6 m (5' 3\")   Wt 64.5 kg (142 lb 3.2 oz)   SpO2 99%     Constitutional:   Body Habitus: Body mass index is 25.19 kg/m².  Cooperation: Fully cooperates with exam  Appearance: Well-groomed, well-nourished.    Eyes: No scleral icterus to suggest severe liver disease, no proptosis to suggest severe hyperthyroidism    ENT -no obvious auditory deficits, no noticeable facial droop     Skin -no rashes or lesions noted     Respiratory-  breathing comfortably on room air, no audible wheezing    Cardiovascular-distal extremities warm and well perfused.  No lower extremity edema is noted.     Gastrointestinal - no obvious abdominal masses, non-distended    Psychiatric- alert and oriented ×3. Normal affect.     Gait - normal gait, no use of ambulatory device, nonantalgic.     Musculoskeletal and Neuro -      Thoracic/Lumbar Spine/Sacral Spine/Hips   Inspection: No evidence of atrophy in bilateral lower " extremities throughout      Full active range of motion with lumbar extension     Facet loading maneuver negative bilaterally     Palpation:   No tenderness to palpation over the paraspinal muscles bilaterally, lumbar facets bilaterally   Tenderness to palpation over bilateral glutes overlying iliac crests, left piriformis overlying left sciatic nerve    Lumbar spine /hip provocative exam maneuvers  SLR, NEVA, and FADIR negative bilaterally.  SLR on the left reproduces sensation of tightness in hamstring      Key points for the international standards for neurological classification of spinal cord injury (ISNCSCI) to light touch.   Dermatome R L   L2 2 2   L3 2 2   L4 2 2   L5 2 2   S1 2 2   S2 2 2         Motor Exam Lower Extremities  ? Myotome R L   Hip flexion L2 5 5   Knee extension L3 5 5   Ankle dorsiflexion L4 5 5   Toe extension L5 5 5   Ankle plantarflexion S1 5 5       previous exam  Reflexes  ?   R L   Patella   2+ 2+   Achilles    2+ 2+      Clonus of the ankle negative bilaterally          MEDICAL DECISION MAKING    Medical records review: see under HPI section.     DATA    Labs: No new labs available for review since last visit.   Lab Results   Component Value Date/Time    SODIUM 141 11/27/2024 05:22 AM    SODIUM 136 06/20/2023 03:40 PM    POTASSIUM 4.4 11/27/2024 05:22 AM    POTASSIUM 3.9 06/20/2023 03:40 PM    CHLORIDE 109 (H) 11/27/2024 05:22 AM    CHLORIDE 102 06/20/2023 03:40 PM    CO2 20 11/27/2024 05:22 AM    CO2 25 06/20/2023 03:40 PM    ANION 9.0 06/20/2023 03:40 PM    GLUCOSE 86 11/27/2024 05:22 AM    GLUCOSE 83 06/20/2023 03:40 PM    BUN 22 11/27/2024 05:22 AM    BUN 18 06/20/2023 03:40 PM    CREATININE 0.99 11/27/2024 05:22 AM    CREATININE 0.96 06/20/2023 03:40 PM    CALCIUM 9.4 11/27/2024 05:22 AM    CALCIUM 9.7 06/20/2023 03:40 PM    ASTSGOT 22 11/27/2024 05:22 AM    ASTSGOT 20 11/22/2014 08:09 AM    ALTSGPT 15 11/27/2024 05:22 AM    ALTSGPT 15 11/22/2014 08:09 AM    TBILIRUBIN 0.3  "11/27/2024 05:22 AM    TBILIRUBIN 0.4 11/22/2014 08:09 AM    ALBUMIN 4.2 11/27/2024 05:22 AM    ALBUMIN 4.3 11/22/2014 08:09 AM    TOTPROTEIN 6.5 11/27/2024 05:22 AM    TOTPROTEIN 7.2 11/22/2014 08:09 AM    GLOBULIN 2.3 11/27/2024 05:22 AM    GLOBULIN 2.9 11/22/2014 08:09 AM    AGRATIO 1.5 11/22/2014 08:09 AM       No results found for: \"PROTHROMBTM\", \"INR\"     Lab Results   Component Value Date/Time    WBC 5.4 12/24/2024 04:15 AM    WBC 9.5 06/20/2023 03:40 PM    RBC 4.19 12/24/2024 04:15 AM    RBC 3.80 (L) 06/20/2023 03:40 PM    HEMOGLOBIN 13.6 12/24/2024 04:15 AM    HEMOGLOBIN 12.8 06/20/2023 03:40 PM    HEMATOCRIT 41.3 12/24/2024 04:15 AM    HEMATOCRIT 38.4 06/20/2023 03:40 PM    MCV 99 (H) 12/24/2024 04:15 AM    .1 (H) 06/20/2023 03:40 PM    MCH 32.5 12/24/2024 04:15 AM    MCH 33.7 (H) 06/20/2023 03:40 PM    MCHC 32.9 12/24/2024 04:15 AM    MCHC 33.3 06/20/2023 03:40 PM    MPV 10.3 06/20/2023 03:40 PM    NEUTSPOLYS 39 12/24/2024 04:15 AM    NEUTSPOLYS 65.30 06/20/2023 03:40 PM    LYMPHOCYTES 45 12/24/2024 04:15 AM    LYMPHOCYTES 25.40 06/20/2023 03:40 PM    MONOCYTES 7 12/24/2024 04:15 AM    MONOCYTES 5.20 06/20/2023 03:40 PM    EOSINOPHILS 8 12/24/2024 04:15 AM    EOSINOPHILS 3.40 06/20/2023 03:40 PM    BASOPHILS 1 12/24/2024 04:15 AM    BASOPHILS 0.50 06/20/2023 03:40 PM        No results found for: \"HBA1C\"     Imaging:   I personally reviewed following images, these are my reads  MRI lumbar spine 10/19/2023  Annular tear at L5-S1.  Moderate central canal stenosis at L4-5.  Mild central canal stenosis at L3-4.  At L2-3 and L3-4 and L4-5 and L5-S1 there is mild bilateral neuroforaminal stenosis.  Facet joint synovitis at bilateral L4-5.  Facet arthropathy at bilateral L5-S1. See formal radiology report for further details.     MRI lumbar spine 10/19/2023  No evidence of sacroiliitis.    MRI lumbar spine 2/26/2025  At L4-5 there is moderate central canal stenosis and mild bilateral neuroforaminal " stenosis worse on the right.  At L5-S1 there is mild bilateral neuroforaminal stenosis.  Annular tear at L5-S1.  Facet arthropathy at multiple levels most severe at bilateral L4-5. See formal radiology report for further details.      IMAGING radiology reads. I reviewed the following radiology reads                      Results for orders placed during the hospital encounter of 10/19/23    MR-LUMBAR SPINE-W/O    Impression  1.  Multilevel degenerative disc disease and facet degeneration. There is moderate central canal narrowing at L4-5, mild central canal narrowing at L3-4 and borderline central canal narrowing at L2-3.    2.  Mild multilevel neural foraminal narrowing as specifically described above.    3.  Mild multilevel degenerative subluxation.        Results for orders placed during the hospital encounter of 10/19/23    MR-LUMBAR SPINE-W/O    Impression  1.  Multilevel degenerative disc disease and facet degeneration. There is moderate central canal narrowing at L4-5, mild central canal narrowing at L3-4 and borderline central canal narrowing at L2-3.    2.  Mild multilevel neural foraminal narrowing as specifically described above.    3.  Mild multilevel degenerative subluxation.               Results for orders placed during the hospital encounter of 10/19/23    MR-PELVIS W/O    Impression  1.  No evidence of marrow edema or arthropathy of the hip articulations.    2.  No evidence of sacroiliitis.    3.  No evidence of muscle or tendon injury.    4.  Osteitis symphysis pubis.                              Results for orders placed in visit on 06/07/21    DX-CLAVICLE LEFT                                            Diagnosis  Visit Diagnoses     ICD-10-CM   1. Chronic left-sided thoracic back pain  M54.6    G89.29   2. Myalgia  M79.10   3. Neuralgia  M79.2   4. Annular tear of lumbar disc  M51.369   5. Chronic bilateral low back pain without sciatica  M54.50    G89.29   6. Lumbar stenosis without neurogenic  claudication  M48.061   7. Left lumbar radiculitis  M54.16   8. Other chronic pain  G89.29   9. Facet arthropathy  M47.819   10. Lumbar spondylosis  M47.816             ASSESSMENT AND PLAN:  Evelyn Mullins (: 1975) is a female who initially presented with bilateral low back pain that appeared to be facetogenic bilaterally, reproduced with positive facet loading maneuver and with facet arthropathy seen on MRI lumbar spine corresponding to her areas of pain, now improved after bilateral lumbar medial branch RFA.      Now with worsening radicular symptoms in left leg in L5 dermatomal distribution likely secondary to left lumbar radiculitis, possibly also from left-sided neuralgia secondary to left piriformis syndrome     Also with left sided thoracic/UE pain after radiation for left breast cancer     Boogie was seen today for injections.    Diagnoses and all orders for this visit:    Chronic left-sided thoracic back pain    Myalgia  -     Trigger Point Injections  -     Consent for all Surgical, Special Diagnostic or Therapeutic Procedures    Neuralgia    Annular tear of lumbar disc    Chronic bilateral low back pain without sciatica    Lumbar stenosis without neurogenic claudication    Left lumbar radiculitis    Other chronic pain    Facet arthropathy    Lumbar spondylosis    Other orders  -     lidocaine (Xylocaine) 1 % injection 5 mL  -     bupivacaine (pf) (Marcaine/Sensorcaine) 0.5 % injection 5 mL  -     lidocaine (Xylocaine) 1 % injection 5 mL              PLAN  Physical Therapy: cont PT. She has done over 3 months of PT     Diagnostic workup: Personally reviewed at today's visit:   Fluoroscopic images from 25 indicating successful needle placement for lumbar Transforaminal Epidural Steroid Injection at the  left  L5-S1 levels    Medications:   - OK to continue ibuprofen 200mg PRN which significantly helps. Patient would like to minimize medication intake.   -agree with discontinuation of  gabapentin given GI side effects with bloating and constipation and cognitive side effects. Had pain relief x 1 weekwith 1200mg QHS.  It had previously improved her postradiation pain of the left torso and upper extremity  - discussed lyrica.  Reduce dose to 50 mg nightly given the similar degree of pain relief and less confusion than with 100mg QHS.  Refill today.  - I have discussed trial of Cymbalta versus Effexor, she would like to defer these at this time due to side effects that could occur with weaning this medication     Interventions:   - radiofrequency ablation of medial branch nerves targeting Bilateral L4-L5 and L5-S1 facet joints with sedation as needed  -Discussed left L5-S1 transforaminal epidural steroid injection under fluoroscopic guidance.  The risks, benefits, and alternatives to this procedure were discussed and the patient wishes to proceed with the procedure. Risks include but are not limited to damage to surrounding structures, infection, bleeding, worsening of pain which can be permanent, and weakness which can be permanent. Benefits include pain relief and improved function. Alternatives include not doing the procedure.    - Discussed trigger point injections under ultrasound guidance including left piriformis as well as left sciatic nerve block today under ultrasound guidance. The risks, benefits, and alternatives to this procedure were discussed and the patient wishes to proceed with the procedure. Risks include but are not limited to damage to surrounding structures, infection, bleeding, worsening of pain which can be permanent, and weakness which can be permanent. Benefits include pain relief and improved function. Alternatives include not doing the procedure.    - Previously discussed consideration of peripheral nerve stimulator for left thoracic pain if medications are inadequate to control her pain and if the severity of her pain warrants     Other  - has done acupuncture with  significant relief of left piriformis pain but no significant relief of low back pain most recently   - of note patient was on tamoxifen for 1 year. She went off of tamoxifen for 6 weeks starting in Nov, and during this time she had significant improvement in back pain for 2 weeks but unfortunately the pain worsened since. She notes hx of left foot pain that was thought to be an achilles injury but denies significant pain in all of her joints.  I have discussed that I would expect for tamoxifen to increase her pain more diffusely then only in her low back.  I shared this information with her oncology team    Follow-up: As scheduled, 3 weeks after epidural.  Assess effect of today's trigger point injections and sciatic nerve block and epidural at that visit    Orders Placed This Encounter    lidocaine (Xylocaine) 1 % injection 5 mL    bupivacaine (pf) (Marcaine/Sensorcaine) 0.5 % injection 5 mL    lidocaine (Xylocaine) 1 % injection 5 mL    Consent for all Surgical, Special Diagnostic or Therapeutic Procedures       Doris Nevarez MD  Interventional Pain and Spine  Physical Medicine and Rehabilitation  [facility]      The above note documents my personal evaluation of this patient. In addition, I have reviewed and confirmed with the patient and MA the supportive information documented in today's Patient Health Questionnaire and Office Note.     Please note that this dictation was created using voice recognition software. I have made every reasonable attempt to correct obvious errors, but I expect that there are errors of grammar and possibly content that I did not discover before finalizing the note.

## 2025-06-17 ENCOUNTER — APPOINTMENT (OUTPATIENT)
Dept: PHYSICAL MEDICINE AND REHAB | Facility: MEDICAL CENTER | Age: 50
End: 2025-06-17
Payer: COMMERCIAL

## 2025-06-17 VITALS
HEIGHT: 63 IN | OXYGEN SATURATION: 100 % | WEIGHT: 141.31 LBS | TEMPERATURE: 97.6 F | BODY MASS INDEX: 25.04 KG/M2 | HEART RATE: 80 BPM | DIASTOLIC BLOOD PRESSURE: 90 MMHG | SYSTOLIC BLOOD PRESSURE: 127 MMHG

## 2025-06-17 DIAGNOSIS — M47.816 LUMBAR SPONDYLOSIS: ICD-10-CM

## 2025-06-17 DIAGNOSIS — M54.50 CHRONIC BILATERAL LOW BACK PAIN WITHOUT SCIATICA: ICD-10-CM

## 2025-06-17 DIAGNOSIS — M54.16 LEFT LUMBAR RADICULITIS: ICD-10-CM

## 2025-06-17 DIAGNOSIS — M79.18 CHRONIC GLUTEAL PAIN: ICD-10-CM

## 2025-06-17 DIAGNOSIS — M53.3 SACROILIAC JOINT DYSFUNCTION OF RIGHT SIDE: ICD-10-CM

## 2025-06-17 DIAGNOSIS — G89.29 CHRONIC BILATERAL LOW BACK PAIN WITHOUT SCIATICA: ICD-10-CM

## 2025-06-17 DIAGNOSIS — M79.2 NEURALGIA: ICD-10-CM

## 2025-06-17 DIAGNOSIS — M51.369 ANNULAR TEAR OF LUMBAR DISC: ICD-10-CM

## 2025-06-17 DIAGNOSIS — G89.29 CHRONIC LEFT-SIDED THORACIC BACK PAIN: ICD-10-CM

## 2025-06-17 DIAGNOSIS — M79.10 MYALGIA: Primary | ICD-10-CM

## 2025-06-17 DIAGNOSIS — G89.29 CHRONIC GLUTEAL PAIN: ICD-10-CM

## 2025-06-17 DIAGNOSIS — M48.061 LUMBAR STENOSIS WITHOUT NEUROGENIC CLAUDICATION: ICD-10-CM

## 2025-06-17 DIAGNOSIS — G89.29 OTHER CHRONIC PAIN: ICD-10-CM

## 2025-06-17 DIAGNOSIS — M47.819 FACET ARTHROPATHY: ICD-10-CM

## 2025-06-17 DIAGNOSIS — M54.6 CHRONIC LEFT-SIDED THORACIC BACK PAIN: ICD-10-CM

## 2025-06-17 PROCEDURE — 99215 OFFICE O/P EST HI 40 MIN: CPT | Performed by: STUDENT IN AN ORGANIZED HEALTH CARE EDUCATION/TRAINING PROGRAM

## 2025-06-17 PROCEDURE — 3074F SYST BP LT 130 MM HG: CPT | Performed by: STUDENT IN AN ORGANIZED HEALTH CARE EDUCATION/TRAINING PROGRAM

## 2025-06-17 PROCEDURE — 3080F DIAST BP >= 90 MM HG: CPT | Performed by: STUDENT IN AN ORGANIZED HEALTH CARE EDUCATION/TRAINING PROGRAM

## 2025-06-17 PROCEDURE — 1125F AMNT PAIN NOTED PAIN PRSNT: CPT | Performed by: STUDENT IN AN ORGANIZED HEALTH CARE EDUCATION/TRAINING PROGRAM

## 2025-06-17 RX ORDER — PREGABALIN 25 MG/1
25-50 CAPSULE ORAL 2 TIMES DAILY
Qty: 120 CAPSULE | Refills: 0 | Status: SHIPPED | OUTPATIENT
Start: 2025-06-17 | End: 2025-07-17

## 2025-06-17 ASSESSMENT — PATIENT HEALTH QUESTIONNAIRE - PHQ9
CLINICAL INTERPRETATION OF PHQ2 SCORE: 1
SUM OF ALL RESPONSES TO PHQ QUESTIONS 1-9: 2
5. POOR APPETITE OR OVEREATING: 0 - NOT AT ALL

## 2025-06-17 ASSESSMENT — FIBROSIS 4 INDEX: FIB4 SCORE: 0.94

## 2025-06-17 ASSESSMENT — PAIN SCALES - GENERAL: PAINLEVEL_OUTOF10: 7=MODERATE-SEVERE PAIN

## 2025-06-17 NOTE — PROGRESS NOTES
Verbal consent was acquired by the patient to use [employer] ambient listening note generation during this visit Yes     Follow-up patient Note    Interventional Pain and Spine  Physiatry (Physical Medicine and Rehabilitation)     Patient Name: Evelyn Mullins  : 1975  Date of service: 2025    Chief Complaint:   Chief Complaint   Patient presents with    Follow-Up     4wk post SP       HISTORY FROM INITIAL VISIT (2025):     History of Present Illness  Evelyn Mullins is a 50-year-old female who presents for a new patient visit.     She reports experiencing back pain, which began in early  as left-sided discomfort, initially attributed to her piriformis muscle. The pain radiated down her leg, prompting her to seek physical therapy. Despite several months of therapy, her condition did not improve, leading her to consult with Dr. Maciel. A pelvic and lumbar MRI was performed, but no specific diagnosis was made. An injection was suggested, but she declined. Instead, she opted for acupuncture, which significantly alleviated her symptoms after three sessions. However, in , she began experiencing generalized back pain. In 2024, she sustained a left Achilles injury, which was managed by her physical therapist. As her Achilles injury improved, her lower back pain worsened, particularly with rotational movements. Her physical therapist identified instabilities in her lower lumbar facet joints or SI joint, and they have been focusing on core stability without significant progress.       Her movement patterns have been distorted due to her back issues. She reports no radiating pain down her right leg but feels discomfort in her hip. She has not been able to correlate her pain with any specific activities. She finds it difficult to rise from a chair and experiences pain upon waking up in the morning. Prolonged sitting followed by standing exacerbates her pain, but walking provides some  relief. She occasionally experiences tingling in her left leg, but it is less frequent and intense than when it first started in 2023. She reports no weakness, numbness, or tingling in her legs. She has tried acupuncture again this fall, focusing on her back, but it did not help.      She has been off tamoxifen for 6 weeks since 2024, during which she experienced a brief period of relief. She resumed tamoxifen at a lower dose in 2025 but discontinued it a week ago. Her oncologist suggested a possible link between tamoxifen and her symptoms, however she denies significant pain all over with resuming tamoxifen.     She reports constant discomfort at her left torso and upper extremity after surgery and radiation for breast cancer, which is present even at rest.     Her physical therapist at [facility] has been dry needling her lower lumbar and SI joint. She finds that consciously engaging her abs and standing up reduces her pain. She takes ibuprofen 200 mg as needed for pain relief. She has not tried gabapentin or duloxetine for her radiation-induced pain. She is considering Cymbalta as suggested by her primary care physician. She is not interested in taking Effexor due to possible side effects. She has a new MRI scheduled at [facility]. She reports no pregnancy, diabetes, pacemaker or wiring, or claustrophobia.     MEDICATIONS  Current: tamoxifen, ibuprofen    HPI  Today's visit   Evelyn Mullins ( 1975) is a female with The primary encounter diagnosis was Myalgia. Diagnoses of Chronic left-sided thoracic back pain, Neuralgia, Annular tear of lumbar disc, Chronic bilateral low back pain without sciatica, Lumbar stenosis without neurogenic claudication, Left lumbar radiculitis, Other chronic pain, Facet arthropathy, Lumbar spondylosis, Chronic gluteal pain, and Sacroiliac joint dysfunction of right side were also pertinent to this visit.    Presents today after  25   Lumbar Transforaminal Epidural Steroid Injection at the  left  L5-S1 levels.     History of Present Illness    The patient presents for evaluation of bilateral gluteal pain.  Mainly located at her bilateral glutes with radiating pain down her left leg and right side of pain mainly at her sacroiliac area and just lateral to her sacroiliac area    Bilateral gluteal Pain  - Persistent pain (7/10) with minimal relief from trigger point injections  - Pain radiates down her leg, compromising mobility  - Prolonged sitting exacerbates the pain, as evidenced by limping after a 45-minute car ride to physical therapy  - Physical therapy sessions are limited due to restricted movement  - Bilateral lower back discomfort occurs during extension exercises  - Epidural injection provided temporary relief on the left side  - Extension activities trigger pain  - Right-sided pain is described as a constant hammering sensation extending into her hip  - Severe pain occurred while doing dishes, causing her to stop and cry until assisted  - Pain is more pronounced on the right side, running horizontally across her lower back to her anterior hips bilaterally, likened to wearing a giant belt  - Sudden or large movements exacerbate the pain, currently radiating into her left calf  - Difficulty standing up from bed and straightening herself is reported    Medication  - She is on pregabalin 50 mg at night, which reduces left-sided pain but does not alleviate back pain  - Daytime use causes drowsiness and dizziness  - Increasing the dose to 100 mg at night was too strong  - Effects wear off after 24 hours.  Has not tried pregabalin 25 mg     Other Treatments  - Stretching does not alleviate pain; deep tissue massage worsened it  - Improvement followed nerve ablation, allowing movement without waking up, but pain returned  - Pain is constant, regardless of exercise or stretching  - Dry needling provides temporary relief  - Core strength built  through physical therapy has not reduced pain    Bowel Habits  - No changes in bowel habits associated with pain  - Gabapentin caused severe gas  - Irregular bowel movements, alternating between constipation and diarrhea, are reported  - No history of IBS    Menstrual Cycle  - No menstrual cycle due to tamoxifen, but spotting occurs  - No lower abdominal pain    Other Information  - No history of hip issues  - She has a race this weekend and another in August 2025    Supplemental information: She has undergone a pelvic MRI. Back pain, described as a hammering sensation on the right side, has been present since last fall.    Pain severity 7/10 currently  Pt denies new numbness, tingling, or weakness.    Procedure history:  - 3/5/25 diagnostic lumbar medial branch blocks targeting the bilateral L4-L5 and L5-S1 facet joints -over 80% relief of index pain  - 3/27/25 diagnostic lumbar medial branch blocks targeting the bilateral L4-L5 and L5-S1 facet joints -over 80% relief of index pain  - 4/17/25 Medial Branch Radiofrequency neurotomy targeting the right and left L4-L5 and L5-S1 facet joint(s) with sedation.  Resolution of index pain on exam and at least 50% improvement in mobility and ability to perform ADLs such as transitioning from sit to standing  - 5/22/25  Lumbar Transforaminal Epidural Steroid Injection at the  left  L5-S1 levels.  Improvement in index pain  - 5/28/25 trigger point injections no imrpovement    ROS:   Red Flags ROS:   Fever, Chills, Sweats: Denies  Involuntary Weight Loss: Denies  Bladder Incontinence: Denies  Bowel Incontinence: denies  Saddle Anesthesia: Denies    All other systems reviewed and negative.     PMHx:   Past Medical History:   Diagnosis Date    Bronchitis     Cancer (HCC) 05/30/23    High cholesterol     Technically high but not being treated    Malignant neoplasm of upper-outer quadrant of left breast in female, estrogen receptor positive (HCC) 6/20/2023    Urinary incontinence      Sneezing, jumping       PSHx:   Past Surgical History:   Procedure Laterality Date    DE NJX AA&/STRD TFRML EPI LUMBAR/SACRAL 1 LEVEL Left 5/22/2025    Procedure: LEFT L5-S1 transforaminal epidural steroid injection with fluoroscopic guidance;  Surgeon: Doris Nevarez M.D.;  [facility];  Service: Pain Management    RADIO FREQUENCY ABLATION ADDITIONAL LEVEL Bilateral 4/17/2025    Procedure: RIGHT and LEFT radiofrequency neurotomies medial branch targeting the L4-5 and L5-S1 facet joints with fluoroscopic guidance and sedation…Note: Plan for 80 °C for 90 seconds for each neurotomy;  Surgeon: Doris Nevarez M.D.;  Location: [facility];  Service: Pain Management    LUMBAR MEDIAL BRANCH BLOCKS Bilateral 3/27/2025    Procedure: Diagnostic medial branch blocks targeting the BILATERAL L4-5 and L5-S1 facet joints with fluoroscopic guidance #2;  Surgeon: Doris Nevarez M.D.;  Location: [facility];  Service: Pain Management    LUMBAR MEDIAL BRANCH BLOCKS Bilateral 3/5/2025    Procedure: Diagnostic medial branch blocks targeting the BILATERAL L4-5 and L5-S1 facet joints with fluoroscopic guidance #1;  Surgeon: Doris Nevarez M.D.;  Location: [facility];  Service: Pain Management    PB MASTECTOMY, PARTIAL Left 6/27/2023    Procedure: WIRE LOCALIZED LEFT PARTIAL MASTECTOMY, MASTOPEXY, LEFT SENTINEL LYMPH INJECTION WITH EXCISION OF AXILLARY NODES;  Surgeon: Antionette Au M.D.;  Location: SURGERY [facility];  Service: General    DE SUSPENSION OF BREAST Left 6/27/2023    Procedure: MASTOPEXY;  Surgeon: Antionette Au M.D.;  Location: SURGERY SAME DAY [facility];  Service: General    NODE BIOPSY SENTINEL Left 6/27/2023    Procedure: BIOPSY, LYMPH NODE, SENTINEL;  Surgeon: Antionette Au M.D.;  Location: SURGERY SAME DAY [facility];  Service: General    NO PERTINENT PAST SURGICAL HISTORY         Family Hx:   History reviewed. No  pertinent family history.    Social Hx:  Social History     Socioeconomic History    Marital status: Single     Spouse name: Not on file    Number of children: Not on file    Years of education: Not on file    Highest education level: Not on file   Occupational History    Not on file   Tobacco Use    Smoking status: Never     Passive exposure: Never    Smokeless tobacco: Never   Vaping Use    Vaping status: Never Used   Substance and Sexual Activity    Alcohol use: Not Currently    Drug use: Not Currently     Types: Inhaled     Comment: Tried marijuana a few times    Sexual activity: Not on file   Other Topics Concern    Not on file   Social History Narrative    Not on file     Social Drivers of Health     Financial Resource Strain: Not on file   Food Insecurity: Not on file   Transportation Needs: Not on file   Physical Activity: Not on file   Stress: Not on file   Social Connections: Not on file   Intimate Partner Violence: Not on file   Housing Stability: Not on file       Allergies:  Allergies   Allergen Reactions    Pcn [Penicillins] Anaphylaxis    Shellfish Allergy Swelling       Medications: reviewed on epic.   Outpatient Medications Marked as Taking for the 6/17/25 encounter (Office Visit) with Doris Nevarez M.D.   Medication Sig Dispense Refill    pregabalin (LYRICA) 25 MG Cap Take 1-2 Capsules by mouth 2 times a day for 30 days. 120 Capsule 0    levothyroxine (SYNTHROID) 200 MCG Tab       tamoxifen (NOLVADEX) 10 MG Tab Take 0.5 Tablets by mouth every day. 90 Tablet 3    Omega-3 Fatty Acids (FISH OIL) 1000 MG Cap capsule Take 1,000 mg by mouth 3 times a day with meals.      EPSOLAY 5 % cream       Melatonin 1 MG/4ML Liquid       Probiotic Product (PROBIOTIC + TURMERIC EXTRACT) 400 MG Cap       glutamine 500 mg/mL oral suspension       fluticasone (FLONASE) 50 MCG/ACT nasal spray Administer 1 Spray into affected nostril(S) every day.      VITAMIN D PO Take 2,000 Int'l Units by mouth every day.       "MAGNESIUM PO Take 400 mg by mouth every day.      multivitamin Tab Take 1 Tablet by mouth every day.      Cetirizine HCl (ZYRTEC ALLERGY PO) Take 10 mg by mouth every day.          Current Outpatient Medications on File Prior to Visit   Medication Sig Dispense Refill    levothyroxine (SYNTHROID) 200 MCG Tab       tamoxifen (NOLVADEX) 10 MG Tab Take 0.5 Tablets by mouth every day. 90 Tablet 3    Omega-3 Fatty Acids (FISH OIL) 1000 MG Cap capsule Take 1,000 mg by mouth 3 times a day with meals.      EPSOLAY 5 % cream       Melatonin 1 MG/4ML Liquid       Probiotic Product (PROBIOTIC + TURMERIC EXTRACT) 400 MG Cap       glutamine 500 mg/mL oral suspension       fluticasone (FLONASE) 50 MCG/ACT nasal spray Administer 1 Spray into affected nostril(S) every day.      VITAMIN D PO Take 2,000 Int'l Units by mouth every day.      MAGNESIUM PO Take 400 mg by mouth every day.      multivitamin Tab Take 1 Tablet by mouth every day.      Cetirizine HCl (ZYRTEC ALLERGY PO) Take 10 mg by mouth every day.       No current facility-administered medications on file prior to visit.         EXAMINATION     Physical Exam:   BP (!) 127/90   Pulse 80   Temp 36.4 °C (97.6 °F) (Temporal)   Ht 1.6 m (5' 3\")   Wt 64.1 kg (141 lb 5 oz)   SpO2 100%     Constitutional:   Body Habitus: Body mass index is 25.03 kg/m².  Cooperation: Fully cooperates with exam  Appearance: Well-groomed, well-nourished.    Eyes: No scleral icterus to suggest severe liver disease, no proptosis to suggest severe hyperthyroidism    ENT -no obvious auditory deficits, no noticeable facial droop     Skin -no rashes or lesions noted     Respiratory-  breathing comfortably on room air, no audible wheezing    Cardiovascular-distal extremities warm and well perfused.  No lower extremity edema is noted.     Gastrointestinal - no obvious abdominal masses, non-distended    Psychiatric- alert and oriented ×3. Normal affect.     Gait - normal gait, no use of ambulatory device, " nonantalgic.     Musculoskeletal and Neuro -      Thoracic/Lumbar Spine/Sacral Spine/Hips   Inspection: No evidence of atrophy in bilateral lower extremities throughout      Full active range of motion with lumbar extension     Facet loading maneuver negative bilaterally     Palpation:   No tenderness to palpation over the paraspinal muscles bilaterally, lumbar facets bilaterally   Tenderness to palpation over bilateral glutes overlying iliac crests, left piriformis overlying left sciatic nerve    Lumbar spine /hip provocative exam maneuvers  SLR negative bilaterally    SI joint tests  NEVA test negative bilaterally  SI joint compression positive on right, negative on left  SI joint distraction negative bilaterally  Sacral thrust test positive on right, negative on left  Thigh thrust test positive on right, negative on left  Gaenslens maneuver positive on right, negative on left      Key points for the international standards for neurological classification of spinal cord injury (ISNCSCI) to light touch.   Dermatome R L   L2 2 2   L3 2 2   L4 2 2   L5 2 2   S1 2 2   S2 2 2         Motor Exam Lower Extremities  ? Myotome R L   Hip flexion L2 5 5   Knee extension L3 5 5   Ankle dorsiflexion L4 5 5   Toe extension L5 5 5   Ankle plantarflexion S1 5 5       previous exam  Reflexes  ?   R L   Patella   2+ 2+   Achilles    2+ 2+      Clonus of the ankle negative bilaterally          MEDICAL DECISION MAKING    Medical records review: see under HPI section.     DATA    Labs: No new labs available for review since last visit.   Lab Results   Component Value Date/Time    SODIUM 141 11/27/2024 05:22 AM    SODIUM 136 06/20/2023 03:40 PM    POTASSIUM 4.4 11/27/2024 05:22 AM    POTASSIUM 3.9 06/20/2023 03:40 PM    CHLORIDE 109 (H) 11/27/2024 05:22 AM    CHLORIDE 102 06/20/2023 03:40 PM    CO2 20 11/27/2024 05:22 AM    CO2 25 06/20/2023 03:40 PM    ANION 9.0 06/20/2023 03:40 PM    GLUCOSE 86 11/27/2024 05:22 AM    GLUCOSE 83  "06/20/2023 03:40 PM    BUN 22 11/27/2024 05:22 AM    BUN 18 06/20/2023 03:40 PM    CREATININE 0.99 11/27/2024 05:22 AM    CREATININE 0.96 06/20/2023 03:40 PM    CALCIUM 9.4 11/27/2024 05:22 AM    CALCIUM 9.7 06/20/2023 03:40 PM    ASTSGOT 22 11/27/2024 05:22 AM    ASTSGOT 20 11/22/2014 08:09 AM    ALTSGPT 15 11/27/2024 05:22 AM    ALTSGPT 15 11/22/2014 08:09 AM    TBILIRUBIN 0.3 11/27/2024 05:22 AM    TBILIRUBIN 0.4 11/22/2014 08:09 AM    ALBUMIN 4.2 11/27/2024 05:22 AM    ALBUMIN 4.3 11/22/2014 08:09 AM    TOTPROTEIN 6.5 11/27/2024 05:22 AM    TOTPROTEIN 7.2 11/22/2014 08:09 AM    GLOBULIN 2.3 11/27/2024 05:22 AM    GLOBULIN 2.9 11/22/2014 08:09 AM    AGRATIO 1.5 11/22/2014 08:09 AM       No results found for: \"PROTHROMBTM\", \"INR\"     Lab Results   Component Value Date/Time    WBC 5.4 12/24/2024 04:15 AM    WBC 9.5 06/20/2023 03:40 PM    RBC 4.19 12/24/2024 04:15 AM    RBC 3.80 (L) 06/20/2023 03:40 PM    HEMOGLOBIN 13.6 12/24/2024 04:15 AM    HEMOGLOBIN 12.8 06/20/2023 03:40 PM    HEMATOCRIT 41.3 12/24/2024 04:15 AM    HEMATOCRIT 38.4 06/20/2023 03:40 PM    MCV 99 (H) 12/24/2024 04:15 AM    .1 (H) 06/20/2023 03:40 PM    MCH 32.5 12/24/2024 04:15 AM    MCH 33.7 (H) 06/20/2023 03:40 PM    MCHC 32.9 12/24/2024 04:15 AM    MCHC 33.3 06/20/2023 03:40 PM    MPV 10.3 06/20/2023 03:40 PM    NEUTSPOLYS 39 12/24/2024 04:15 AM    NEUTSPOLYS 65.30 06/20/2023 03:40 PM    LYMPHOCYTES 45 12/24/2024 04:15 AM    LYMPHOCYTES 25.40 06/20/2023 03:40 PM    MONOCYTES 7 12/24/2024 04:15 AM    MONOCYTES 5.20 06/20/2023 03:40 PM    EOSINOPHILS 8 12/24/2024 04:15 AM    EOSINOPHILS 3.40 06/20/2023 03:40 PM    BASOPHILS 1 12/24/2024 04:15 AM    BASOPHILS 0.50 06/20/2023 03:40 PM        No results found for: \"HBA1C\"     Imaging:   I personally reviewed following images, these are my reads  MRI lumbar spine 10/19/2023  Annular tear at L5-S1.  Moderate central canal stenosis at L4-5.  Mild central canal stenosis at L3-4.  At L2-3 and L3-4 " and L4-5 and L5-S1 there is mild bilateral neuroforaminal stenosis.  Facet joint synovitis at bilateral L4-5.  Facet arthropathy at bilateral L5-S1. See formal radiology report for further details.     MRI lumbar spine 10/19/2023  No evidence of sacroiliitis.    MRI lumbar spine 2/26/2025  At L4-5 there is moderate central canal stenosis and mild bilateral neuroforaminal stenosis worse on the right.  At L5-S1 there is mild bilateral neuroforaminal stenosis.  Annular tear at L5-S1.  Facet arthropathy at multiple levels most severe at bilateral L4-5. See formal radiology report for further details.    MRI pelvis 10/19/2023  No evidence of peritrochanteric bursitis related psoas bursitis.  No evidence of hip arthropathy.See formal radiology report for further details.      IMAGING radiology reads. I reviewed the following radiology reads                      Results for orders placed during the hospital encounter of 10/19/23    MR-LUMBAR SPINE-W/O    Impression  1.  Multilevel degenerative disc disease and facet degeneration. There is moderate central canal narrowing at L4-5, mild central canal narrowing at L3-4 and borderline central canal narrowing at L2-3.    2.  Mild multilevel neural foraminal narrowing as specifically described above.    3.  Mild multilevel degenerative subluxation.        Results for orders placed during the hospital encounter of 10/19/23    MR-LUMBAR SPINE-W/O    Impression  1.  Multilevel degenerative disc disease and facet degeneration. There is moderate central canal narrowing at L4-5, mild central canal narrowing at L3-4 and borderline central canal narrowing at L2-3.    2.  Mild multilevel neural foraminal narrowing as specifically described above.    3.  Mild multilevel degenerative subluxation.               Results for orders placed during the hospital encounter of 10/19/23    MR-PELVIS W/O    Impression  1.  No evidence of marrow edema or arthropathy of the hip articulations.    2.  No  evidence of sacroiliitis.    3.  No evidence of muscle or tendon injury.    4.  Osteitis symphysis pubis.                              Results for orders placed in visit on 21    DX-CLAVICLE LEFT                                            Diagnosis  Visit Diagnoses     ICD-10-CM   1. Myalgia  M79.10   2. Chronic left-sided thoracic back pain  M54.6    G89.29   3. Neuralgia  M79.2   4. Annular tear of lumbar disc  M51.369   5. Chronic bilateral low back pain without sciatica  M54.50    G89.29   6. Lumbar stenosis without neurogenic claudication  M48.061   7. Left lumbar radiculitis  M54.16   8. Other chronic pain  G89.29   9. Facet arthropathy  M47.819   10. Lumbar spondylosis  M47.816   11. Chronic gluteal pain  M79.18    G89.29   12. Sacroiliac joint dysfunction of right side  M53.3               ASSESSMENT AND PLAN:  Evelyn Mullins (: 1975) is a female who initially presented with bilateral low back pain that appeared to be facetogenic bilaterally, reproduced with positive facet loading maneuver and with facet arthropathy seen on MRI lumbar spine corresponding to her areas of pain, now improved after bilateral lumbar medial branch RFA.      Now with worsening radicular symptoms in left leg in L5 dermatomal distribution likely secondary to left lumbar radiculitis, possibly also from left-sided neuralgia secondary to left piriformis syndrome     Also with left sided thoracic/UE pain after radiation for left breast cancer     Boogie was seen today for follow-up.    Diagnoses and all orders for this visit:    Myalgia    Chronic left-sided thoracic back pain    Neuralgia  -     pregabalin (LYRICA) 25 MG Cap; Take 1-2 Capsules by mouth 2 times a day for 30 days.    Annular tear of lumbar disc    Chronic bilateral low back pain without sciatica  -     MR-PELVIS W/O; Future    Lumbar stenosis without neurogenic claudication  -     pregabalin (LYRICA) 25 MG Cap; Take 1-2 Capsules by mouth 2 times a day  for 30 days.    Left lumbar radiculitis  -     pregabalin (LYRICA) 25 MG Cap; Take 1-2 Capsules by mouth 2 times a day for 30 days.    Other chronic pain  -     pregabalin (LYRICA) 25 MG Cap; Take 1-2 Capsules by mouth 2 times a day for 30 days.    Facet arthropathy    Lumbar spondylosis    Chronic gluteal pain  -     MR-PELVIS W/O; Future    Sacroiliac joint dysfunction of right side  -     Pain Hospital Procedure; Future                PLAN  Physical Therapy: cont PT. She has done over 3 months of PT     Diagnostic workup: Personally reviewed at today's visit:   MRI pelvis 10/19/2023  - Order new MRI hip as above    Medications:   - OK to continue ibuprofen 200mg PRN which significantly helps. Patient would like to minimize medication intake.   -agree with discontinuation of gabapentin given GI side effects with bloating and constipation and cognitive side effects. Had pain relief x 1 weekwith 1200mg QHS.  It had previously improved her postradiation pain of the left torso and upper extremity  - Prescribe Lyrica 25 mg increments for her to trial 25-50 mg twice daily as needed.  She has been taking Lyrica 50 mg nightly with a similar degree of pain relief and less confusion than with 100mg QHS  - I have discussed trial of Cymbalta versus Effexor, she would like to defer these at this time due to side effects that could occur with weaning this medication     Interventions:   - radiofrequency ablation of medial branch nerves targeting Bilateral L4-L5 and L5-S1 facet joints with sedation as needed  -left L5-S1 transforaminal epidural steroid injection under fluoroscopic guidance as needed  - S/p trigger point injections under ultrasound guidance including left piriformis as well as left sciatic nerve block with no significant relief  - Previously discussed consideration of peripheral nerve stimulator for left thoracic pain if medications are inadequate to control her pain and if the severity of her pain warrants  -  discussed right sacroiliac joint injection under fluoroscopic guidance. The risks, benefits, and alternatives to this procedure were discussed and the patient wishes to proceed with the procedure. Risks include but are not limited to damage to surrounding structures, infection, bleeding, worsening of pain which can be permanent, and weakness which can be permanent. Benefits include pain relief and improved function. Alternatives include not doing the procedure.      Other  - has done acupuncture with significant relief of left piriformis pain but no significant relief of low back pain most recently   - of note patient was on tamoxifen for 1 year. She went off of tamoxifen for 6 weeks starting in Nov, and during this time she had significant improvement in back pain for 2 weeks but unfortunately the pain worsened since. She notes hx of left foot pain that was thought to be an achilles injury but denies significant pain in all of her joints.  I have discussed that I would expect for tamoxifen to increase her pain more diffusely then only in her low back.  I shared this information with her oncology team    Follow-up: 4 weeks after sacroiliac joint injection or after MRI, whichever 1 is sooner    Orders Placed This Encounter    [facility] Procedure    MR-PELVIS W/O    pregabalin (LYRICA) 25 MG Cap       Doris Nevarez MD  Interventional Pain and Spine  Physical Medicine and Rehabilitation  [facility]    Total time: 42 minutes. I spent greater than 50% of the time for patient care and coordination on this date, including face-to-face time with the patient as per assessment and plan above.       The above note documents my personal evaluation of this patient. In addition, I have reviewed and confirmed with the patient and MA the supportive information documented in today's Patient Health Questionnaire and Office Note.     Please note that this dictation was created using voice recognition software. I have made  every reasonable attempt to correct obvious errors, but I expect that there are errors of grammar and possibly content that I did not discover before finalizing the note.

## 2025-06-24 ENCOUNTER — HOSPITAL ENCOUNTER (OUTPATIENT)
Dept: RADIOLOGY | Facility: MEDICAL CENTER | Age: 50
End: 2025-06-24
Attending: INTERNAL MEDICINE
Payer: COMMERCIAL

## 2025-06-24 DIAGNOSIS — Z12.31 ENCOUNTER FOR SCREENING MAMMOGRAM FOR MALIGNANT NEOPLASM OF BREAST: ICD-10-CM

## 2025-06-24 DIAGNOSIS — Z12.31 SCREENING MAMMOGRAM, ENCOUNTER FOR: ICD-10-CM

## 2025-06-24 PROCEDURE — 77063 BREAST TOMOSYNTHESIS BI: CPT

## 2025-07-01 ENCOUNTER — HOSPITAL ENCOUNTER (OUTPATIENT)
Dept: RADIOLOGY | Facility: MEDICAL CENTER | Age: 50
End: 2025-07-01
Attending: STUDENT IN AN ORGANIZED HEALTH CARE EDUCATION/TRAINING PROGRAM
Payer: COMMERCIAL

## 2025-07-01 DIAGNOSIS — M79.18 CHRONIC GLUTEAL PAIN: ICD-10-CM

## 2025-07-01 DIAGNOSIS — M54.50 CHRONIC BILATERAL LOW BACK PAIN WITHOUT SCIATICA: ICD-10-CM

## 2025-07-01 DIAGNOSIS — G89.29 CHRONIC BILATERAL LOW BACK PAIN WITHOUT SCIATICA: ICD-10-CM

## 2025-07-01 DIAGNOSIS — G89.29 CHRONIC GLUTEAL PAIN: ICD-10-CM

## 2025-07-01 PROCEDURE — 72195 MRI PELVIS W/O DYE: CPT

## 2025-07-08 ENCOUNTER — APPOINTMENT (OUTPATIENT)
Dept: PHYSICAL MEDICINE AND REHAB | Facility: MEDICAL CENTER | Age: 50
End: 2025-07-08
Payer: COMMERCIAL

## 2025-07-08 VITALS
HEART RATE: 73 BPM | DIASTOLIC BLOOD PRESSURE: 78 MMHG | TEMPERATURE: 97.2 F | WEIGHT: 144.18 LBS | HEIGHT: 63 IN | OXYGEN SATURATION: 100 % | BODY MASS INDEX: 25.55 KG/M2 | SYSTOLIC BLOOD PRESSURE: 106 MMHG

## 2025-07-08 DIAGNOSIS — G89.29 CHRONIC LEFT-SIDED THORACIC BACK PAIN: ICD-10-CM

## 2025-07-08 DIAGNOSIS — G89.29 CHRONIC GLUTEAL PAIN: ICD-10-CM

## 2025-07-08 DIAGNOSIS — M79.10 MYALGIA: Primary | ICD-10-CM

## 2025-07-08 DIAGNOSIS — M53.3 SACROILIAC JOINT DYSFUNCTION OF RIGHT SIDE: ICD-10-CM

## 2025-07-08 DIAGNOSIS — M47.816 LUMBAR SPONDYLOSIS: ICD-10-CM

## 2025-07-08 DIAGNOSIS — G89.29 OTHER CHRONIC PAIN: ICD-10-CM

## 2025-07-08 DIAGNOSIS — M51.369 ANNULAR TEAR OF LUMBAR DISC: ICD-10-CM

## 2025-07-08 DIAGNOSIS — G89.29 CHRONIC BILATERAL LOW BACK PAIN WITHOUT SCIATICA: ICD-10-CM

## 2025-07-08 DIAGNOSIS — M79.18 CHRONIC GLUTEAL PAIN: ICD-10-CM

## 2025-07-08 DIAGNOSIS — M54.16 LEFT LUMBAR RADICULITIS: ICD-10-CM

## 2025-07-08 DIAGNOSIS — M47.819 FACET ARTHROPATHY: ICD-10-CM

## 2025-07-08 DIAGNOSIS — M48.061 LUMBAR STENOSIS WITHOUT NEUROGENIC CLAUDICATION: ICD-10-CM

## 2025-07-08 DIAGNOSIS — M79.2 NEURALGIA: ICD-10-CM

## 2025-07-08 DIAGNOSIS — M54.50 CHRONIC BILATERAL LOW BACK PAIN WITHOUT SCIATICA: ICD-10-CM

## 2025-07-08 DIAGNOSIS — M54.6 CHRONIC LEFT-SIDED THORACIC BACK PAIN: ICD-10-CM

## 2025-07-08 PROCEDURE — 3078F DIAST BP <80 MM HG: CPT | Performed by: STUDENT IN AN ORGANIZED HEALTH CARE EDUCATION/TRAINING PROGRAM

## 2025-07-08 PROCEDURE — 99214 OFFICE O/P EST MOD 30 MIN: CPT | Performed by: STUDENT IN AN ORGANIZED HEALTH CARE EDUCATION/TRAINING PROGRAM

## 2025-07-08 PROCEDURE — 3074F SYST BP LT 130 MM HG: CPT | Performed by: STUDENT IN AN ORGANIZED HEALTH CARE EDUCATION/TRAINING PROGRAM

## 2025-07-08 PROCEDURE — 1125F AMNT PAIN NOTED PAIN PRSNT: CPT | Performed by: STUDENT IN AN ORGANIZED HEALTH CARE EDUCATION/TRAINING PROGRAM

## 2025-07-08 RX ORDER — PREGABALIN 25 MG/1
75 CAPSULE ORAL
Qty: 90 CAPSULE | Refills: 2 | Status: SHIPPED | OUTPATIENT
Start: 2025-07-08 | End: 2025-08-07

## 2025-07-08 ASSESSMENT — PATIENT HEALTH QUESTIONNAIRE - PHQ9
CLINICAL INTERPRETATION OF PHQ2 SCORE: 1
SUM OF ALL RESPONSES TO PHQ QUESTIONS 1-9: 4
5. POOR APPETITE OR OVEREATING: 0 - NOT AT ALL

## 2025-07-08 ASSESSMENT — FIBROSIS 4 INDEX: FIB4 SCORE: 0.94

## 2025-07-08 ASSESSMENT — PAIN SCALES - GENERAL: PAINLEVEL_OUTOF10: 8=MODERATE-SEVERE PAIN

## 2025-07-08 NOTE — H&P (VIEW-ONLY)
Verbal consent was acquired by the patient to use OVGuide ambient listening note generation during this visit Yes     Follow-up patient Note    Interventional Pain and Spine  Physiatry (Physical Medicine and Rehabilitation)     Patient Name: Evelyn Mullins  : 1975  Date of service: 2025    Chief Complaint:   Chief Complaint   Patient presents with    Follow-Up     Back pain, MRI Review       HISTORY FROM INITIAL VISIT (2025):     History of Present Illness  Evelyn Mullins is a 50-year-old female who presents for a new patient visit.     She reports experiencing back pain, which began in early  as left-sided discomfort, initially attributed to her piriformis muscle. The pain radiated down her leg, prompting her to seek physical therapy. Despite several months of therapy, her condition did not improve, leading her to consult with Dr. Maciel. A pelvic and lumbar MRI was performed, but no specific diagnosis was made. An injection was suggested, but she declined. Instead, she opted for acupuncture, which significantly alleviated her symptoms after three sessions. However, in , she began experiencing generalized back pain. In 2024, she sustained a left Achilles injury, which was managed by her physical therapist. As her Achilles injury improved, her lower back pain worsened, particularly with rotational movements. Her physical therapist identified instabilities in her lower lumbar facet joints or SI joint, and they have been focusing on core stability without significant progress.       Her movement patterns have been distorted due to her back issues. She reports no radiating pain down her right leg but feels discomfort in her hip. She has not been able to correlate her pain with any specific activities. She finds it difficult to rise from a chair and experiences pain upon waking up in the morning. Prolonged sitting followed by standing exacerbates her pain, but walking  provides some relief. She occasionally experiences tingling in her left leg, but it is less frequent and intense than when it first started in 2023. She reports no weakness, numbness, or tingling in her legs. She has tried acupuncture again this fall, focusing on her back, but it did not help.      She has been off tamoxifen for 6 weeks since 2024, during which she experienced a brief period of relief. She resumed tamoxifen at a lower dose in 2025 but discontinued it a week ago. Her oncologist suggested a possible link between tamoxifen and her symptoms, however she denies significant pain all over with resuming tamoxifen.     She reports constant discomfort at her left torso and upper extremity after surgery and radiation for breast cancer, which is present even at rest.     Her physical therapist at [facility] has been dry needling her lower lumbar and SI joint. She finds that consciously engaging her abs and standing up reduces her pain. She takes ibuprofen 200 mg as needed for pain relief. She has not tried gabapentin or duloxetine for her radiation-induced pain. She is considering Cymbalta as suggested by her primary care physician. She is not interested in taking Effexor due to possible side effects. She has a new MRI scheduled at [facility]. She reports no pregnancy, diabetes, pacemaker or wiring, or claustrophobia.     MEDICATIONS  Current: tamoxifen, ibuprofen    HPI  Today's visit   Evelyn Mullins ( 1975) is a female with The primary encounter diagnosis was Myalgia. Diagnoses of Neuralgia, Lumbar stenosis without neurogenic claudication, Left lumbar radiculitis, Other chronic pain, Chronic left-sided thoracic back pain, Annular tear of lumbar disc, Chronic bilateral low back pain without sciatica, Facet arthropathy, Lumbar spondylosis, Chronic gluteal pain, and Sacroiliac joint dysfunction of right side were also pertinent to this visit.    Presents today  after  5/22/25  Lumbar Transforaminal Epidural Steroid Injection at the  left  L5-S1 levels.     History of Present Illness  The patient presents for right-sided gluteal pain.    Right-sided pain  - Increased right-sided pain originating from her spine and extending to her hip and gluteal region  - Intensifying over the past week, making it difficult to sit and stand  - No numbness or tingling radiating down her leg.  No groin pain.  - Left side is functioning well  - Believes the nerve ablation and injection on her left side have been beneficial    Scheduled sacroiliac joint injection  - Scheduled for 07/23/2025  - Training for a 100K run and plans to taper training around the injection    Medication  - Taking Lyrica 50 mg at night.  Unable to fill previous prescription  - Finds relief with Tylenol 500 mg  - Not taking ibuprofen    Physical therapy at [facility]  - Attending physical therapy since last fall, initially for Achilles injury, now focused on back pain  - Therapy includes strengthening exercises for glutes and core, performed independently for 30 to 45 minutes  - Receives dry needling, providing significant relief  - Continues strength training once a week  - Noticed improvement in core and glute muscles compared to a year ago  - Running and cycling  - Her current physical therapist is on leave and she is contemplating trying a different physical therapist    Imaging results  - Lumbar MRI revealed arthritis in lumbar facet joints  - Pelvic MRI showed moderate arthritis in hips and tendinitis in the muscle on the side of the hip    Supplemental information: None    Pain severity 8/10 currently  Pt denies new numbness, tingling, or weakness.    Procedure history:  - 3/5/25 diagnostic lumbar medial branch blocks targeting the bilateral L4-L5 and L5-S1 facet joints -over 80% relief of index pain  - 3/27/25 diagnostic lumbar medial branch blocks targeting the bilateral L4-L5 and L5-S1 facet joints -over 80% relief  of index pain  - 4/17/25 Medial Branch Radiofrequency neurotomy targeting the right and left L4-L5 and L5-S1 facet joint(s) with sedation.  Resolution of index pain on exam and at least 50% improvement in mobility and ability to perform ADLs such as transitioning from sit to standing  - 5/22/25  Lumbar Transforaminal Epidural Steroid Injection at the  left  L5-S1 levels.  Improvement in index pain  - 5/28/25 trigger point injections no imrpovement    ROS:   Red Flags ROS:   Fever, Chills, Sweats: Denies  Involuntary Weight Loss: Denies  Bladder Incontinence: Denies  Bowel Incontinence: denies  Saddle Anesthesia: Denies    All other systems reviewed and negative.     PMHx:   Past Medical History:   Diagnosis Date    Bronchitis     Cancer (HCC) 05/30/23    High cholesterol     Technically high but not being treated    Malignant neoplasm of upper-outer quadrant of left breast in female, estrogen receptor positive (HCC) 6/20/2023    Urinary incontinence     Sneezing, jumping       PSHx:   Past Surgical History:   Procedure Laterality Date    MT NJX AA&/STRD TFRML EPI LUMBAR/SACRAL 1 LEVEL Left 5/22/2025    Procedure: LEFT L5-S1 transforaminal epidural steroid injection with fluoroscopic guidance;  Surgeon: Doris Nevarez M.D.;  [facility];  Service: Pain Management    RADIO FREQUENCY ABLATION ADDITIONAL LEVEL Bilateral 4/17/2025    Procedure: RIGHT and LEFT radiofrequency neurotomies medial branch targeting the L4-5 and L5-S1 facet joints with fluoroscopic guidance and sedation…Note: Plan for 80 °C for 90 seconds for each neurotomy;  Surgeon: Doris Nevarez M.D.;  Location: [facility];  Service: Pain Management    LUMBAR MEDIAL BRANCH BLOCKS Bilateral 3/27/2025    Procedure: Diagnostic medial branch blocks targeting the BILATERAL L4-5 and L5-S1 facet joints with fluoroscopic guidance #2;  Surgeon: Doris Nevarez M.D.;  Location: [facility];  Service:  Pain Management    LUMBAR MEDIAL BRANCH BLOCKS Bilateral 3/5/2025    Procedure: Diagnostic medial branch blocks targeting the BILATERAL L4-5 and L5-S1 facet joints with fluoroscopic guidance #1;  Surgeon: Doris Nevarez M.D.;  Location: [facility];  Service: Pain Management    PB MASTECTOMY, PARTIAL Left 6/27/2023    Procedure: WIRE LOCALIZED LEFT PARTIAL MASTECTOMY, MASTOPEXY, LEFT SENTINEL LYMPH INJECTION WITH EXCISION OF AXILLARY NODES;  Surgeon: Antionette Au M.D.;  Location: [facility];  Service: General    NV SUSPENSION OF BREAST Left 6/27/2023    Procedure: MASTOPEXY;  Surgeon: Antionette Au M.D.;  Location: [facility];  Service: General    NODE BIOPSY SENTINEL Left 6/27/2023    Procedure: BIOPSY, LYMPH NODE, SENTINEL;  Surgeon: Antionette Au M.D.;  Location: [facility];  Service: General    NO PERTINENT PAST SURGICAL HISTORY         Family Hx:   History reviewed. No pertinent family history.    Social Hx:  Social History     Socioeconomic History    Marital status: Single     Spouse name: Not on file    Number of children: Not on file    Years of education: Not on file    Highest education level: Not on file   Occupational History    Not on file   Tobacco Use    Smoking status: Never     Passive exposure: Never    Smokeless tobacco: Never   Vaping Use    Vaping status: Never Used   Substance and Sexual Activity    Alcohol use: Not Currently    Drug use: Not Currently     Types: Inhaled     Comment: Tried marijuana a few times    Sexual activity: Not on file   Other Topics Concern    Not on file   Social History Narrative    Not on file     Social Drivers of Health     Financial Resource Strain: Not on file   Food Insecurity: Not on file   Transportation Needs: Not on file   Physical Activity: Not on file   Stress: Not on file   Social Connections: Not on file   Intimate Partner Violence: Not on file   Housing Stability: Not on file        Allergies:  Allergies   Allergen Reactions    Pcn [Penicillins] Anaphylaxis    Shellfish Allergy Swelling       Medications: reviewed on epic.   Outpatient Medications Marked as Taking for the 7/8/25 encounter (Office Visit) with Doris Nevarez M.D.   Medication Sig Dispense Refill    pregabalin (LYRICA) 25 MG Cap Take 3 Capsules by mouth at bedtime for 30 days. 90 Capsule 2    levothyroxine (SYNTHROID) 200 MCG Tab       tamoxifen (NOLVADEX) 10 MG Tab Take 0.5 Tablets by mouth every day. 90 Tablet 3    Omega-3 Fatty Acids (FISH OIL) 1000 MG Cap capsule Take 1,000 mg by mouth 3 times a day with meals.      EPSOLAY 5 % cream       Melatonin 1 MG/4ML Liquid       Probiotic Product (PROBIOTIC + TURMERIC EXTRACT) 400 MG Cap       glutamine 500 mg/mL oral suspension       fluticasone (FLONASE) 50 MCG/ACT nasal spray Administer 1 Spray into affected nostril(S) every day.      VITAMIN D PO Take 2,000 Int'l Units by mouth every day.      MAGNESIUM PO Take 400 mg by mouth every day.      multivitamin Tab Take 1 Tablet by mouth every day.      Cetirizine HCl (ZYRTEC ALLERGY PO) Take 10 mg by mouth every day.          Current Outpatient Medications on File Prior to Visit   Medication Sig Dispense Refill    levothyroxine (SYNTHROID) 200 MCG Tab       tamoxifen (NOLVADEX) 10 MG Tab Take 0.5 Tablets by mouth every day. 90 Tablet 3    Omega-3 Fatty Acids (FISH OIL) 1000 MG Cap capsule Take 1,000 mg by mouth 3 times a day with meals.      EPSOLAY 5 % cream       Melatonin 1 MG/4ML Liquid       Probiotic Product (PROBIOTIC + TURMERIC EXTRACT) 400 MG Cap       glutamine 500 mg/mL oral suspension       fluticasone (FLONASE) 50 MCG/ACT nasal spray Administer 1 Spray into affected nostril(S) every day.      VITAMIN D PO Take 2,000 Int'l Units by mouth every day.      MAGNESIUM PO Take 400 mg by mouth every day.      multivitamin Tab Take 1 Tablet by mouth every day.      Cetirizine HCl (ZYRTEC ALLERGY PO) Take 10 mg by mouth every  "day.       No current facility-administered medications on file prior to visit.         EXAMINATION     Physical Exam:   /78   Pulse 73   Temp 36.2 °C (97.2 °F) (Temporal)   Ht 1.6 m (5' 3\")   Wt 65.4 kg (144 lb 2.9 oz)   SpO2 100%     Constitutional:   Body Habitus: Body mass index is 25.54 kg/m².  Cooperation: Fully cooperates with exam  Appearance: Well-groomed, well-nourished.    Eyes: No scleral icterus to suggest severe liver disease, no proptosis to suggest severe hyperthyroidism    ENT -no obvious auditory deficits, no noticeable facial droop     Skin -no rashes or lesions noted     Respiratory-  breathing comfortably on room air, no audible wheezing    Cardiovascular-distal extremities warm and well perfused.  No lower extremity edema is noted.     Gastrointestinal - no obvious abdominal masses, non-distended    Psychiatric- alert and oriented ×3. Normal affect.     Gait - normal gait, no use of ambulatory device, nonantalgic.     Musculoskeletal and Neuro -      Thoracic/Lumbar Spine/Sacral Spine/Hips   Inspection: No evidence of atrophy in bilateral lower extremities throughout      Full active range of motion with lumbar extension     Facet loading maneuver positive on the right, negative on the left    Tenderness palpation over the right sacroiliac joint and right glute      Key points for the international standards for neurological classification of spinal cord injury (ISNCSCI) to light touch.   Dermatome R L   L2 2 2   L3 2 2   L4 2 2   L5 2 2   S1 2 2   S2 2 2         Motor Exam Lower Extremities  ? Myotome R L   Hip flexion L2 5 5   Knee extension L3 5 5   Ankle dorsiflexion L4 5 5   Toe extension L5 5 5   Ankle plantarflexion S1 5 5       previous exam     Palpation:   No tenderness to palpation over the paraspinal muscles bilaterally, lumbar facets bilaterally   Tenderness to palpation over bilateral glutes overlying iliac crests, left piriformis overlying left sciatic nerve    Lumbar " "spine /hip provocative exam maneuvers  SLR negative bilaterally    SI joint tests  NEVA test negative bilaterally  SI joint compression positive on right, negative on left  SI joint distraction negative bilaterally  Sacral thrust test positive on right, negative on left  Thigh thrust test positive on right, negative on left  Gaenslens maneuver positive on right, negative on left    Reflexes  ?   R L   Patella   2+ 2+   Achilles    2+ 2+      Clonus of the ankle negative bilaterally          MEDICAL DECISION MAKING    Medical records review: see under HPI section.     DATA    Labs: No new labs available for review since last visit.   Lab Results   Component Value Date/Time    SODIUM 141 11/27/2024 05:22 AM    SODIUM 136 06/20/2023 03:40 PM    POTASSIUM 4.4 11/27/2024 05:22 AM    POTASSIUM 3.9 06/20/2023 03:40 PM    CHLORIDE 109 (H) 11/27/2024 05:22 AM    CHLORIDE 102 06/20/2023 03:40 PM    CO2 20 11/27/2024 05:22 AM    CO2 25 06/20/2023 03:40 PM    ANION 9.0 06/20/2023 03:40 PM    GLUCOSE 86 11/27/2024 05:22 AM    GLUCOSE 83 06/20/2023 03:40 PM    BUN 22 11/27/2024 05:22 AM    BUN 18 06/20/2023 03:40 PM    CREATININE 0.99 11/27/2024 05:22 AM    CREATININE 0.96 06/20/2023 03:40 PM    CALCIUM 9.4 11/27/2024 05:22 AM    CALCIUM 9.7 06/20/2023 03:40 PM    ASTSGOT 22 11/27/2024 05:22 AM    ASTSGOT 20 11/22/2014 08:09 AM    ALTSGPT 15 11/27/2024 05:22 AM    ALTSGPT 15 11/22/2014 08:09 AM    TBILIRUBIN 0.3 11/27/2024 05:22 AM    TBILIRUBIN 0.4 11/22/2014 08:09 AM    ALBUMIN 4.2 11/27/2024 05:22 AM    ALBUMIN 4.3 11/22/2014 08:09 AM    TOTPROTEIN 6.5 11/27/2024 05:22 AM    TOTPROTEIN 7.2 11/22/2014 08:09 AM    GLOBULIN 2.3 11/27/2024 05:22 AM    GLOBULIN 2.9 11/22/2014 08:09 AM    AGRATIO 1.5 11/22/2014 08:09 AM       No results found for: \"PROTHROMBTM\", \"INR\"     Lab Results   Component Value Date/Time    WBC 5.4 12/24/2024 04:15 AM    WBC 9.5 06/20/2023 03:40 PM    RBC 4.19 12/24/2024 04:15 AM    RBC 3.80 (L) 06/20/2023 " "03:40 PM    HEMOGLOBIN 13.6 12/24/2024 04:15 AM    HEMOGLOBIN 12.8 06/20/2023 03:40 PM    HEMATOCRIT 41.3 12/24/2024 04:15 AM    HEMATOCRIT 38.4 06/20/2023 03:40 PM    MCV 99 (H) 12/24/2024 04:15 AM    .1 (H) 06/20/2023 03:40 PM    MCH 32.5 12/24/2024 04:15 AM    MCH 33.7 (H) 06/20/2023 03:40 PM    MCHC 32.9 12/24/2024 04:15 AM    MCHC 33.3 06/20/2023 03:40 PM    MPV 10.3 06/20/2023 03:40 PM    NEUTSPOLYS 39 12/24/2024 04:15 AM    NEUTSPOLYS 65.30 06/20/2023 03:40 PM    LYMPHOCYTES 45 12/24/2024 04:15 AM    LYMPHOCYTES 25.40 06/20/2023 03:40 PM    MONOCYTES 7 12/24/2024 04:15 AM    MONOCYTES 5.20 06/20/2023 03:40 PM    EOSINOPHILS 8 12/24/2024 04:15 AM    EOSINOPHILS 3.40 06/20/2023 03:40 PM    BASOPHILS 1 12/24/2024 04:15 AM    BASOPHILS 0.50 06/20/2023 03:40 PM        No results found for: \"HBA1C\"     Imaging:   I personally reviewed following images, these are my reads  MRI lumbar spine 10/19/2023  Annular tear at L5-S1.  Moderate central canal stenosis at L4-5.  Mild central canal stenosis at L3-4.  At L2-3 and L3-4 and L4-5 and L5-S1 there is mild bilateral neuroforaminal stenosis.  Facet joint synovitis at bilateral L4-5.  Facet arthropathy at bilateral L5-S1. See formal radiology report for further details.     MRI lumbar spine 10/19/2023  No evidence of sacroiliitis.    MRI lumbar spine 2/26/2025  At L4-5 there is moderate central canal stenosis and mild bilateral neuroforaminal stenosis worse on the right.  At L5-S1 there is mild bilateral neuroforaminal stenosis.  Annular tear at L5-S1.  Facet arthropathy at multiple levels most severe at bilateral L4-5. See formal radiology report for further details.    MRI pelvis 10/19/2023  No evidence of peritrochanteric bursitis related psoas bursitis.  No evidence of hip arthropathy.See formal radiology report for further details.    MRI pelvis 7/1/2025  Moderate OA in bilateral hips.  Mild tendinosis of right gluteus medius and gluteus minimus tendons. See " formal radiology report for further details.      IMAGING radiology reads. I reviewed the following radiology reads                      Results for orders placed during the hospital encounter of 10/19/23    MR-LUMBAR SPINE-W/O    Impression  1.  Multilevel degenerative disc disease and facet degeneration. There is moderate central canal narrowing at L4-5, mild central canal narrowing at L3-4 and borderline central canal narrowing at L2-3.    2.  Mild multilevel neural foraminal narrowing as specifically described above.    3.  Mild multilevel degenerative subluxation.        Results for orders placed during the hospital encounter of 10/19/23    MR-LUMBAR SPINE-W/O    Impression  1.  Multilevel degenerative disc disease and facet degeneration. There is moderate central canal narrowing at L4-5, mild central canal narrowing at L3-4 and borderline central canal narrowing at L2-3.    2.  Mild multilevel neural foraminal narrowing as specifically described above.    3.  Mild multilevel degenerative subluxation.               Results for orders placed during the hospital encounter of 10/19/23    MR-PELVIS W/O    Impression  1.  No evidence of marrow edema or arthropathy of the hip articulations.    2.  No evidence of sacroiliitis.    3.  No evidence of muscle or tendon injury.    4.  Osteitis symphysis pubis.                              Results for orders placed in visit on 06/07/21    DX-CLAVICLE LEFT                                            Diagnosis  Visit Diagnoses     ICD-10-CM   1. Myalgia  M79.10   2. Neuralgia  M79.2   3. Lumbar stenosis without neurogenic claudication  M48.061   4. Left lumbar radiculitis  M54.16   5. Other chronic pain  G89.29   6. Chronic left-sided thoracic back pain  M54.6    G89.29   7. Annular tear of lumbar disc  M51.369   8. Chronic bilateral low back pain without sciatica  M54.50    G89.29   9. Facet arthropathy  M47.819   10. Lumbar spondylosis  M47.816   11. Chronic gluteal pain   M79.18    G89.29   12. Sacroiliac joint dysfunction of right side  M53.3               ASSESSMENT AND PLAN:  Evelyn Mullins (: 1975) is a female who initially presented with bilateral low back pain that appeared to be facetogenic bilaterally, reproduced with positive facet loading maneuver and with facet arthropathy seen on MRI lumbar spine corresponding to her areas of pain, now improved after bilateral lumbar medial branch RFA.      Radicular symptoms in left leg in L5 dermatomal distribution improved after lumbar epidural     Also with left sided thoracic/UE pain after radiation for left breast cancer     Boogie was seen today for follow-up.    Diagnoses and all orders for this visit:    Myalgia  -     Referral to Physical Therapy    Neuralgia  -     pregabalin (LYRICA) 25 MG Cap; Take 3 Capsules by mouth at bedtime for 30 days.    Lumbar stenosis without neurogenic claudication  -     pregabalin (LYRICA) 25 MG Cap; Take 3 Capsules by mouth at bedtime for 30 days.  -     Referral to Physical Therapy    Left lumbar radiculitis  -     pregabalin (LYRICA) 25 MG Cap; Take 3 Capsules by mouth at bedtime for 30 days.  -     Referral to Physical Therapy    Other chronic pain  -     pregabalin (LYRICA) 25 MG Cap; Take 3 Capsules by mouth at bedtime for 30 days.    Chronic left-sided thoracic back pain  -     Referral to Physical Therapy    Annular tear of lumbar disc  -     Referral to Physical Therapy    Chronic bilateral low back pain without sciatica  -     Referral to Physical Therapy    Facet arthropathy  -     Referral to Physical Therapy    Lumbar spondylosis  -     Referral to Physical Therapy    Chronic gluteal pain  -     Referral to Physical Therapy    Sacroiliac joint dysfunction of right side  -     Referral to Physical Therapy                PLAN  Physical Therapy: Referral to new physical therapy location today.  She has done over 3 months of PT     Diagnostic workup: Personally reviewed at  today's visit:   MRI pelvis 7/1/2025    Medications:   - OK to continue Tylenol as needed which helps  -agree with discontinuation of gabapentin given GI side effects with bloating and constipation and cognitive side effects. Had pain relief x 1 weekwith 1200mg QHS.  It had previously improved her postradiation pain of the left torso and upper extremity  - Prescribe Lyrica 25 mg increments for her to trial 75 mg nightly.  She has been taking Lyrica 50 mg nightly with a similar degree of pain relief and less confusion than with 100mg QHS  - I have discussed trial of Cymbalta versus Effexor, she would like to defer these at this time due to side effects that could occur with weaning this medication     Interventions:   - radiofrequency ablation of medial branch nerves targeting Bilateral L4-L5 and L5-S1 facet joints with sedation as needed  -left L5-S1 transforaminal epidural steroid injection under fluoroscopic guidance as needed  - S/p trigger point injections under ultrasound guidance including left piriformis as well as left sciatic nerve block with no significant relief  - Previously discussed consideration of peripheral nerve stimulator for left thoracic pain if medications are inadequate to control her pain and if the severity of her pain warrants  - discussed right sacroiliac joint injection under fluoroscopic guidance. The risks, benefits, and alternatives to this procedure were discussed and the patient wishes to proceed with the procedure. Risks include but are not limited to damage to surrounding structures, infection, bleeding, worsening of pain which can be permanent, and weakness which can be permanent. Benefits include pain relief and improved function. Alternatives include not doing the procedure.      Other  - has done acupuncture with significant relief of left piriformis pain but no significant relief of low back pain most recently   - of note patient was on tamoxifen for 1 year. She went off of  tamoxifen for 6 weeks starting in Nov, and during this time she had significant improvement in back pain for 2 weeks but unfortunately the pain worsened since. She notes hx of left foot pain that was thought to be an achilles injury but denies significant pain in all of her joints.  I have discussed that I would expect for tamoxifen to increase her pain more diffusely then only in her low back.  I shared this information with her oncology team    Follow-up: 4 weeks after sacroiliac joint injection    Orders Placed This Encounter    Referral to Physical Therapy    pregabalin (LYRICA) 25 MG Cap       Doris Nevarez MD  Interventional Pain and Spine  Physical Medicine and Rehabilitation  [facility]    Total time: 42 minutes. I spent greater than 50% of the time for patient care and coordination on this date, including face-to-face time with the patient as per assessment and plan above.       The above note documents my personal evaluation of this patient. In addition, I have reviewed and confirmed with the patient and MA the supportive information documented in today's Patient Health Questionnaire and Office Note.     Please note that this dictation was created using voice recognition software. I have made every reasonable attempt to correct obvious errors, but I expect that there are errors of grammar and possibly content that I did not discover before finalizing the note.

## 2025-07-08 NOTE — PROGRESS NOTES
Verbal consent was acquired by the patient to use [employer] ambient listening note generation during this visit Yes     Follow-up patient Note    Interventional Pain and Spine  Physiatry (Physical Medicine and Rehabilitation)     Patient Name: Evelyn Mullins  : 1975  Date of service: 2025    Chief Complaint:   Chief Complaint   Patient presents with    Follow-Up     Back pain, MRI Review       HISTORY FROM INITIAL VISIT (2025):     History of Present Illness  Evelyn Mullins is a 50-year-old female who presents for a new patient visit.     She reports experiencing back pain, which began in early  as left-sided discomfort, initially attributed to her piriformis muscle. The pain radiated down her leg, prompting her to seek physical therapy. Despite several months of therapy, her condition did not improve, leading her to consult with Dr. Maciel. A pelvic and lumbar MRI was performed, but no specific diagnosis was made. An injection was suggested, but she declined. Instead, she opted for acupuncture, which significantly alleviated her symptoms after three sessions. However, in , she began experiencing generalized back pain. In 2024, she sustained a left Achilles injury, which was managed by her physical therapist. As her Achilles injury improved, her lower back pain worsened, particularly with rotational movements. Her physical therapist identified instabilities in her lower lumbar facet joints or SI joint, and they have been focusing on core stability without significant progress.       Her movement patterns have been distorted due to her back issues. She reports no radiating pain down her right leg but feels discomfort in her hip. She has not been able to correlate her pain with any specific activities. She finds it difficult to rise from a chair and experiences pain upon waking up in the morning. Prolonged sitting followed by standing exacerbates her pain, but walking  provides some relief. She occasionally experiences tingling in her left leg, but it is less frequent and intense than when it first started in 2023. She reports no weakness, numbness, or tingling in her legs. She has tried acupuncture again this fall, focusing on her back, but it did not help.      She has been off tamoxifen for 6 weeks since 2024, during which she experienced a brief period of relief. She resumed tamoxifen at a lower dose in 2025 but discontinued it a week ago. Her oncologist suggested a possible link between tamoxifen and her symptoms, however she denies significant pain all over with resuming tamoxifen.     She reports constant discomfort at her left torso and upper extremity after surgery and radiation for breast cancer, which is present even at rest.     Her physical therapist at [facility] has been dry needling her lower lumbar and SI joint. She finds that consciously engaging her abs and standing up reduces her pain. She takes ibuprofen 200 mg as needed for pain relief. She has not tried gabapentin or duloxetine for her radiation-induced pain. She is considering Cymbalta as suggested by her primary care physician. She is not interested in taking Effexor due to possible side effects. She has a new MRI scheduled at [facility]. She reports no pregnancy, diabetes, pacemaker or wiring, or claustrophobia.     MEDICATIONS  Current: tamoxifen, ibuprofen    HPI  Today's visit   Evelyn Mullins ( 1975) is a female with The primary encounter diagnosis was Myalgia. Diagnoses of Neuralgia, Lumbar stenosis without neurogenic claudication, Left lumbar radiculitis, Other chronic pain, Chronic left-sided thoracic back pain, Annular tear of lumbar disc, Chronic bilateral low back pain without sciatica, Facet arthropathy, Lumbar spondylosis, Chronic gluteal pain, and Sacroiliac joint dysfunction of right side were also pertinent to this visit.    Presents today  after  5/22/25  Lumbar Transforaminal Epidural Steroid Injection at the  left  L5-S1 levels.     History of Present Illness  The patient presents for right-sided gluteal pain.    Right-sided pain  - Increased right-sided pain originating from her spine and extending to her hip and gluteal region  - Intensifying over the past week, making it difficult to sit and stand  - No numbness or tingling radiating down her leg.  No groin pain.  - Left side is functioning well  - Believes the nerve ablation and injection on her left side have been beneficial    Scheduled sacroiliac joint injection  - Scheduled for 07/23/2025  - Training for a 100K run and plans to taper training around the injection    Medication  - Taking Lyrica 50 mg at night.  Unable to fill previous prescription  - Finds relief with Tylenol 500 mg  - Not taking ibuprofen    Physical therapy at [facility]  - Attending physical therapy since last fall, initially for Achilles injury, now focused on back pain  - Therapy includes strengthening exercises for glutes and core, performed independently for 30 to 45 minutes  - Receives dry needling, providing significant relief  - Continues strength training once a week  - Noticed improvement in core and glute muscles compared to a year ago  - Running and cycling  - Her current physical therapist is on leave and she is contemplating trying a different physical therapist    Imaging results  - Lumbar MRI revealed arthritis in lumbar facet joints  - Pelvic MRI showed moderate arthritis in hips and tendinitis in the muscle on the side of the hip    Supplemental information: None    Pain severity 8/10 currently  Pt denies new numbness, tingling, or weakness.    Procedure history:  - 3/5/25 diagnostic lumbar medial branch blocks targeting the bilateral L4-L5 and L5-S1 facet joints -over 80% relief of index pain  - 3/27/25 diagnostic lumbar medial branch blocks targeting the bilateral L4-L5 and L5-S1 facet joints -over 80% relief  of index pain  - 4/17/25 Medial Branch Radiofrequency neurotomy targeting the right and left L4-L5 and L5-S1 facet joint(s) with sedation.  Resolution of index pain on exam and at least 50% improvement in mobility and ability to perform ADLs such as transitioning from sit to standing  - 5/22/25  Lumbar Transforaminal Epidural Steroid Injection at the  left  L5-S1 levels.  Improvement in index pain  - 5/28/25 trigger point injections no imrpovement    ROS:   Red Flags ROS:   Fever, Chills, Sweats: Denies  Involuntary Weight Loss: Denies  Bladder Incontinence: Denies  Bowel Incontinence: denies  Saddle Anesthesia: Denies    All other systems reviewed and negative.     PMHx:   Past Medical History:   Diagnosis Date    Bronchitis     Cancer (HCC) 05/30/23    High cholesterol     Technically high but not being treated    Malignant neoplasm of upper-outer quadrant of left breast in female, estrogen receptor positive (HCC) 6/20/2023    Urinary incontinence     Sneezing, jumping       PSHx:   Past Surgical History:   Procedure Laterality Date    DC NJX AA&/STRD TFRML EPI LUMBAR/SACRAL 1 LEVEL Left 5/22/2025    Procedure: LEFT L5-S1 transforaminal epidural steroid injection with fluoroscopic guidance;  Surgeon: Doris Nevarez M.D.;  [facility];  Service: Pain Management    RADIO FREQUENCY ABLATION ADDITIONAL LEVEL Bilateral 4/17/2025    Procedure: RIGHT and LEFT radiofrequency neurotomies medial branch targeting the L4-5 and L5-S1 facet joints with fluoroscopic guidance and sedation…Note: Plan for 80 °C for 90 seconds for each neurotomy;  Surgeon: Doris Nevarez M.D.;  Location: [facility];  Service: Pain Management    LUMBAR MEDIAL BRANCH BLOCKS Bilateral 3/27/2025    Procedure: Diagnostic medial branch blocks targeting the BILATERAL L4-5 and L5-S1 facet joints with fluoroscopic guidance #2;  Surgeon: Doris Nevarez M.D.;  Location: [facility];  Service:  Pain Management    LUMBAR MEDIAL BRANCH BLOCKS Bilateral 3/5/2025    Procedure: Diagnostic medial branch blocks targeting the BILATERAL L4-5 and L5-S1 facet joints with fluoroscopic guidance #1;  Surgeon: Doris Nevarez M.D.;  Location: [facility];  Service: Pain Management    PB MASTECTOMY, PARTIAL Left 6/27/2023    Procedure: WIRE LOCALIZED LEFT PARTIAL MASTECTOMY, MASTOPEXY, LEFT SENTINEL LYMPH INJECTION WITH EXCISION OF AXILLARY NODES;  Surgeon: Antionette Au M.D.;  Location: SURGERY SAME DAY [facility];  Service: General    MI SUSPENSION OF BREAST Left 6/27/2023    Procedure: MASTOPEXY;  Surgeon: Antionette Au M.D.;  Location: SURGERY SAME DAY [facility];  Service: General    NODE BIOPSY SENTINEL Left 6/27/2023    Procedure: BIOPSY, LYMPH NODE, SENTINEL;  Surgeon: Antionette Au M.D.;  Location: SURGERY SAME DAY [facility];  Service: General    NO PERTINENT PAST SURGICAL HISTORY         Family Hx:   History reviewed. No pertinent family history.    Social Hx:  Social History     Socioeconomic History    Marital status: Single     Spouse name: Not on file    Number of children: Not on file    Years of education: Not on file    Highest education level: Not on file   Occupational History    Not on file   Tobacco Use    Smoking status: Never     Passive exposure: Never    Smokeless tobacco: Never   Vaping Use    Vaping status: Never Used   Substance and Sexual Activity    Alcohol use: Not Currently    Drug use: Not Currently     Types: Inhaled     Comment: Tried marijuana a few times    Sexual activity: Not on file   Other Topics Concern    Not on file   Social History Narrative    Not on file     Social Drivers of Health     Financial Resource Strain: Not on file   Food Insecurity: Not on file   Transportation Needs: Not on file   Physical Activity: Not on file   Stress: Not on file   Social Connections: Not on file   Intimate Partner Violence: Not on file   Housing Stability: Not on file        Allergies:  Allergies   Allergen Reactions    Pcn [Penicillins] Anaphylaxis    Shellfish Allergy Swelling       Medications: reviewed on epic.   Outpatient Medications Marked as Taking for the 7/8/25 encounter (Office Visit) with Doris Nevarez M.D.   Medication Sig Dispense Refill    pregabalin (LYRICA) 25 MG Cap Take 3 Capsules by mouth at bedtime for 30 days. 90 Capsule 2    levothyroxine (SYNTHROID) 200 MCG Tab       tamoxifen (NOLVADEX) 10 MG Tab Take 0.5 Tablets by mouth every day. 90 Tablet 3    Omega-3 Fatty Acids (FISH OIL) 1000 MG Cap capsule Take 1,000 mg by mouth 3 times a day with meals.      EPSOLAY 5 % cream       Melatonin 1 MG/4ML Liquid       Probiotic Product (PROBIOTIC + TURMERIC EXTRACT) 400 MG Cap       glutamine 500 mg/mL oral suspension       fluticasone (FLONASE) 50 MCG/ACT nasal spray Administer 1 Spray into affected nostril(S) every day.      VITAMIN D PO Take 2,000 Int'l Units by mouth every day.      MAGNESIUM PO Take 400 mg by mouth every day.      multivitamin Tab Take 1 Tablet by mouth every day.      Cetirizine HCl (ZYRTEC ALLERGY PO) Take 10 mg by mouth every day.          Current Outpatient Medications on File Prior to Visit   Medication Sig Dispense Refill    levothyroxine (SYNTHROID) 200 MCG Tab       tamoxifen (NOLVADEX) 10 MG Tab Take 0.5 Tablets by mouth every day. 90 Tablet 3    Omega-3 Fatty Acids (FISH OIL) 1000 MG Cap capsule Take 1,000 mg by mouth 3 times a day with meals.      EPSOLAY 5 % cream       Melatonin 1 MG/4ML Liquid       Probiotic Product (PROBIOTIC + TURMERIC EXTRACT) 400 MG Cap       glutamine 500 mg/mL oral suspension       fluticasone (FLONASE) 50 MCG/ACT nasal spray Administer 1 Spray into affected nostril(S) every day.      VITAMIN D PO Take 2,000 Int'l Units by mouth every day.      MAGNESIUM PO Take 400 mg by mouth every day.      multivitamin Tab Take 1 Tablet by mouth every day.      Cetirizine HCl (ZYRTEC ALLERGY PO) Take 10 mg by mouth every  "day.       No current facility-administered medications on file prior to visit.         EXAMINATION     Physical Exam:   /78   Pulse 73   Temp 36.2 °C (97.2 °F) (Temporal)   Ht 1.6 m (5' 3\")   Wt 65.4 kg (144 lb 2.9 oz)   SpO2 100%     Constitutional:   Body Habitus: Body mass index is 25.54 kg/m².  Cooperation: Fully cooperates with exam  Appearance: Well-groomed, well-nourished.    Eyes: No scleral icterus to suggest severe liver disease, no proptosis to suggest severe hyperthyroidism    ENT -no obvious auditory deficits, no noticeable facial droop     Skin -no rashes or lesions noted     Respiratory-  breathing comfortably on room air, no audible wheezing    Cardiovascular-distal extremities warm and well perfused.  No lower extremity edema is noted.     Gastrointestinal - no obvious abdominal masses, non-distended    Psychiatric- alert and oriented ×3. Normal affect.     Gait - normal gait, no use of ambulatory device, nonantalgic.     Musculoskeletal and Neuro -      Thoracic/Lumbar Spine/Sacral Spine/Hips   Inspection: No evidence of atrophy in bilateral lower extremities throughout      Full active range of motion with lumbar extension     Facet loading maneuver positive on the right, negative on the left    Tenderness palpation over the right sacroiliac joint and right glute      Key points for the international standards for neurological classification of spinal cord injury (ISNCSCI) to light touch.   Dermatome R L   L2 2 2   L3 2 2   L4 2 2   L5 2 2   S1 2 2   S2 2 2         Motor Exam Lower Extremities  ? Myotome R L   Hip flexion L2 5 5   Knee extension L3 5 5   Ankle dorsiflexion L4 5 5   Toe extension L5 5 5   Ankle plantarflexion S1 5 5       previous exam     Palpation:   No tenderness to palpation over the paraspinal muscles bilaterally, lumbar facets bilaterally   Tenderness to palpation over bilateral glutes overlying iliac crests, left piriformis overlying left sciatic nerve    Lumbar " "spine /hip provocative exam maneuvers  SLR negative bilaterally    SI joint tests  NEVA test negative bilaterally  SI joint compression positive on right, negative on left  SI joint distraction negative bilaterally  Sacral thrust test positive on right, negative on left  Thigh thrust test positive on right, negative on left  Gaenslens maneuver positive on right, negative on left    Reflexes  ?   R L   Patella   2+ 2+   Achilles    2+ 2+      Clonus of the ankle negative bilaterally          MEDICAL DECISION MAKING    Medical records review: see under HPI section.     DATA    Labs: No new labs available for review since last visit.   Lab Results   Component Value Date/Time    SODIUM 141 11/27/2024 05:22 AM    SODIUM 136 06/20/2023 03:40 PM    POTASSIUM 4.4 11/27/2024 05:22 AM    POTASSIUM 3.9 06/20/2023 03:40 PM    CHLORIDE 109 (H) 11/27/2024 05:22 AM    CHLORIDE 102 06/20/2023 03:40 PM    CO2 20 11/27/2024 05:22 AM    CO2 25 06/20/2023 03:40 PM    ANION 9.0 06/20/2023 03:40 PM    GLUCOSE 86 11/27/2024 05:22 AM    GLUCOSE 83 06/20/2023 03:40 PM    BUN 22 11/27/2024 05:22 AM    BUN 18 06/20/2023 03:40 PM    CREATININE 0.99 11/27/2024 05:22 AM    CREATININE 0.96 06/20/2023 03:40 PM    CALCIUM 9.4 11/27/2024 05:22 AM    CALCIUM 9.7 06/20/2023 03:40 PM    ASTSGOT 22 11/27/2024 05:22 AM    ASTSGOT 20 11/22/2014 08:09 AM    ALTSGPT 15 11/27/2024 05:22 AM    ALTSGPT 15 11/22/2014 08:09 AM    TBILIRUBIN 0.3 11/27/2024 05:22 AM    TBILIRUBIN 0.4 11/22/2014 08:09 AM    ALBUMIN 4.2 11/27/2024 05:22 AM    ALBUMIN 4.3 11/22/2014 08:09 AM    TOTPROTEIN 6.5 11/27/2024 05:22 AM    TOTPROTEIN 7.2 11/22/2014 08:09 AM    GLOBULIN 2.3 11/27/2024 05:22 AM    GLOBULIN 2.9 11/22/2014 08:09 AM    AGRATIO 1.5 11/22/2014 08:09 AM       No results found for: \"PROTHROMBTM\", \"INR\"     Lab Results   Component Value Date/Time    WBC 5.4 12/24/2024 04:15 AM    WBC 9.5 06/20/2023 03:40 PM    RBC 4.19 12/24/2024 04:15 AM    RBC 3.80 (L) 06/20/2023 " "03:40 PM    HEMOGLOBIN 13.6 12/24/2024 04:15 AM    HEMOGLOBIN 12.8 06/20/2023 03:40 PM    HEMATOCRIT 41.3 12/24/2024 04:15 AM    HEMATOCRIT 38.4 06/20/2023 03:40 PM    MCV 99 (H) 12/24/2024 04:15 AM    .1 (H) 06/20/2023 03:40 PM    MCH 32.5 12/24/2024 04:15 AM    MCH 33.7 (H) 06/20/2023 03:40 PM    MCHC 32.9 12/24/2024 04:15 AM    MCHC 33.3 06/20/2023 03:40 PM    MPV 10.3 06/20/2023 03:40 PM    NEUTSPOLYS 39 12/24/2024 04:15 AM    NEUTSPOLYS 65.30 06/20/2023 03:40 PM    LYMPHOCYTES 45 12/24/2024 04:15 AM    LYMPHOCYTES 25.40 06/20/2023 03:40 PM    MONOCYTES 7 12/24/2024 04:15 AM    MONOCYTES 5.20 06/20/2023 03:40 PM    EOSINOPHILS 8 12/24/2024 04:15 AM    EOSINOPHILS 3.40 06/20/2023 03:40 PM    BASOPHILS 1 12/24/2024 04:15 AM    BASOPHILS 0.50 06/20/2023 03:40 PM        No results found for: \"HBA1C\"     Imaging:   I personally reviewed following images, these are my reads  MRI lumbar spine 10/19/2023  Annular tear at L5-S1.  Moderate central canal stenosis at L4-5.  Mild central canal stenosis at L3-4.  At L2-3 and L3-4 and L4-5 and L5-S1 there is mild bilateral neuroforaminal stenosis.  Facet joint synovitis at bilateral L4-5.  Facet arthropathy at bilateral L5-S1. See formal radiology report for further details.     MRI lumbar spine 10/19/2023  No evidence of sacroiliitis.    MRI lumbar spine 2/26/2025  At L4-5 there is moderate central canal stenosis and mild bilateral neuroforaminal stenosis worse on the right.  At L5-S1 there is mild bilateral neuroforaminal stenosis.  Annular tear at L5-S1.  Facet arthropathy at multiple levels most severe at bilateral L4-5. See formal radiology report for further details.    MRI pelvis 10/19/2023  No evidence of peritrochanteric bursitis related psoas bursitis.  No evidence of hip arthropathy.See formal radiology report for further details.    MRI pelvis 7/1/2025  Moderate OA in bilateral hips.  Mild tendinosis of right gluteus medius and gluteus minimus tendons. See " formal radiology report for further details.      IMAGING radiology reads. I reviewed the following radiology reads                      Results for orders placed during the hospital encounter of 10/19/23    MR-LUMBAR SPINE-W/O    Impression  1.  Multilevel degenerative disc disease and facet degeneration. There is moderate central canal narrowing at L4-5, mild central canal narrowing at L3-4 and borderline central canal narrowing at L2-3.    2.  Mild multilevel neural foraminal narrowing as specifically described above.    3.  Mild multilevel degenerative subluxation.        Results for orders placed during the hospital encounter of 10/19/23    MR-LUMBAR SPINE-W/O    Impression  1.  Multilevel degenerative disc disease and facet degeneration. There is moderate central canal narrowing at L4-5, mild central canal narrowing at L3-4 and borderline central canal narrowing at L2-3.    2.  Mild multilevel neural foraminal narrowing as specifically described above.    3.  Mild multilevel degenerative subluxation.               Results for orders placed during the hospital encounter of 10/19/23    MR-PELVIS W/O    Impression  1.  No evidence of marrow edema or arthropathy of the hip articulations.    2.  No evidence of sacroiliitis.    3.  No evidence of muscle or tendon injury.    4.  Osteitis symphysis pubis.                              Results for orders placed in visit on 06/07/21    DX-CLAVICLE LEFT                                            Diagnosis  Visit Diagnoses     ICD-10-CM   1. Myalgia  M79.10   2. Neuralgia  M79.2   3. Lumbar stenosis without neurogenic claudication  M48.061   4. Left lumbar radiculitis  M54.16   5. Other chronic pain  G89.29   6. Chronic left-sided thoracic back pain  M54.6    G89.29   7. Annular tear of lumbar disc  M51.369   8. Chronic bilateral low back pain without sciatica  M54.50    G89.29   9. Facet arthropathy  M47.819   10. Lumbar spondylosis  M47.816   11. Chronic gluteal pain   M79.18    G89.29   12. Sacroiliac joint dysfunction of right side  M53.3               ASSESSMENT AND PLAN:  Evelyn Mullins (: 1975) is a female who initially presented with bilateral low back pain that appeared to be facetogenic bilaterally, reproduced with positive facet loading maneuver and with facet arthropathy seen on MRI lumbar spine corresponding to her areas of pain, now improved after bilateral lumbar medial branch RFA.      Radicular symptoms in left leg in L5 dermatomal distribution improved after lumbar epidural     Also with left sided thoracic/UE pain after radiation for left breast cancer     Boogie was seen today for follow-up.    Diagnoses and all orders for this visit:    Myalgia  -     Referral to Physical Therapy    Neuralgia  -     pregabalin (LYRICA) 25 MG Cap; Take 3 Capsules by mouth at bedtime for 30 days.    Lumbar stenosis without neurogenic claudication  -     pregabalin (LYRICA) 25 MG Cap; Take 3 Capsules by mouth at bedtime for 30 days.  -     Referral to Physical Therapy    Left lumbar radiculitis  -     pregabalin (LYRICA) 25 MG Cap; Take 3 Capsules by mouth at bedtime for 30 days.  -     Referral to Physical Therapy    Other chronic pain  -     pregabalin (LYRICA) 25 MG Cap; Take 3 Capsules by mouth at bedtime for 30 days.    Chronic left-sided thoracic back pain  -     Referral to Physical Therapy    Annular tear of lumbar disc  -     Referral to Physical Therapy    Chronic bilateral low back pain without sciatica  -     Referral to Physical Therapy    Facet arthropathy  -     Referral to Physical Therapy    Lumbar spondylosis  -     Referral to Physical Therapy    Chronic gluteal pain  -     Referral to Physical Therapy    Sacroiliac joint dysfunction of right side  -     Referral to Physical Therapy                PLAN  Physical Therapy: Referral to new physical therapy location today.  She has done over 3 months of PT     Diagnostic workup: Personally reviewed at  today's visit:   MRI pelvis 7/1/2025    Medications:   - OK to continue Tylenol as needed which helps  -agree with discontinuation of gabapentin given GI side effects with bloating and constipation and cognitive side effects. Had pain relief x 1 weekwith 1200mg QHS.  It had previously improved her postradiation pain of the left torso and upper extremity  - Prescribe Lyrica 25 mg increments for her to trial 75 mg nightly.  She has been taking Lyrica 50 mg nightly with a similar degree of pain relief and less confusion than with 100mg QHS  - I have discussed trial of Cymbalta versus Effexor, she would like to defer these at this time due to side effects that could occur with weaning this medication     Interventions:   - radiofrequency ablation of medial branch nerves targeting Bilateral L4-L5 and L5-S1 facet joints with sedation as needed  -left L5-S1 transforaminal epidural steroid injection under fluoroscopic guidance as needed  - S/p trigger point injections under ultrasound guidance including left piriformis as well as left sciatic nerve block with no significant relief  - Previously discussed consideration of peripheral nerve stimulator for left thoracic pain if medications are inadequate to control her pain and if the severity of her pain warrants  - discussed right sacroiliac joint injection under fluoroscopic guidance. The risks, benefits, and alternatives to this procedure were discussed and the patient wishes to proceed with the procedure. Risks include but are not limited to damage to surrounding structures, infection, bleeding, worsening of pain which can be permanent, and weakness which can be permanent. Benefits include pain relief and improved function. Alternatives include not doing the procedure.      Other  - has done acupuncture with significant relief of left piriformis pain but no significant relief of low back pain most recently   - of note patient was on tamoxifen for 1 year. She went off of  tamoxifen for 6 weeks starting in Nov, and during this time she had significant improvement in back pain for 2 weeks but unfortunately the pain worsened since. She notes hx of left foot pain that was thought to be an achilles injury but denies significant pain in all of her joints.  I have discussed that I would expect for tamoxifen to increase her pain more diffusely then only in her low back.  I shared this information with her oncology team    Follow-up: 4 weeks after sacroiliac joint injection    Orders Placed This Encounter    Referral to Physical Therapy    pregabalin (LYRICA) 25 MG Cap       Doris Nevarez MD  Interventional Pain and Spine  Physical Medicine and Rehabilitation  [facility]    Total time: 42 minutes. I spent greater than 50% of the time for patient care and coordination on this date, including face-to-face time with the patient as per assessment and plan above.       The above note documents my personal evaluation of this patient. In addition, I have reviewed and confirmed with the patient and MA the supportive information documented in today's Patient Health Questionnaire and Office Note.     Please note that this dictation was created using voice recognition software. I have made every reasonable attempt to correct obvious errors, but I expect that there are errors of grammar and possibly content that I did not discover before finalizing the note.

## 2025-07-09 NOTE — Clinical Note
REFERRAL APPROVAL NOTICE         Sent on July 9, 2025                   Boogie Mullins  [address] [place] [address] [place] [address]                   Dear MsNguyen Susanna,    After a careful review of the medical information and benefit coverage, [facility] has processed your referral. See below for additional details.    If applicable, you must be actively enrolled with your insurance for coverage of the authorized service. If you have any questions regarding your coverage, please contact your insurance directly.    REFERRAL INFORMATION   Referral #:  [id number]  Referred-To Department    Referred-By Provider:  Pain Management    Doris Nevarez M.D.   Pain Management       [address]  [address]  [place] [address]  [phone] [address] [place] [address]  [place] [id number] [phone]    Referral Start Date:  07/09/2025  Referral End Date:   10/09/2025             SCHEDULING  If you do not already have an appointment, please call [phone] to make an appointment.     MORE INFORMATION  If you do not already have a [employer] account, sign up at: [email or web address]  You can access your medical information, make appointments, see lab results, billing information, and more.  If you have questions regarding this referral, please contact  the [facility] Referrals department at:             [phone]. Monday - Friday 8:00AM - 5:00PM.     Sincerely,    [facility]

## 2025-07-10 NOTE — Clinical Note
REFERRAL APPROVAL NOTICE         Sent on July 10, 2025                   Boogie Mullins  [address] [place] [address] [place] [address]                   Dear MsNguyen Susanna,    After a careful review of the medical information and benefit coverage, [facility] has processed your referral. See below for additional details.    If applicable, you must be actively enrolled with your insurance for coverage of the authorized service. If you have any questions regarding your coverage, please contact your insurance directly.    REFERRAL INFORMATION   Referral #:  [id number]  Referred-To Provider    Referred-By Provider:  Physical Therapy    Doris Nevarez M.D.   [facility]      [address]  [address]  [place] [address]  [phone] [address] [place] [address]  [phone]    Referral Start Date:  07/08/2025  Referral End Date:   07/08/2026             SCHEDULING  If you do not already have an appointment, please call [phone] to make an appointment.     MORE INFORMATION  If you do not already have a [employer] account, sign up at: [email or web address]  You can access your medical information, make appointments, see lab results, billing information, and more.  If you have questions regarding this referral, please contact  the [facility] Referrals department at:             [phone]. Monday - Friday 8:00AM - 5:00PM.     Sincerely,    [facility]

## 2025-07-17 ENCOUNTER — HOSPITAL ENCOUNTER (OUTPATIENT)
Dept: HEMATOLOGY ONCOLOGY | Facility: MEDICAL CENTER | Age: 50
End: 2025-07-17
Attending: NURSE PRACTITIONER
Payer: COMMERCIAL

## 2025-07-17 VITALS
HEART RATE: 79 BPM | RESPIRATION RATE: 16 BRPM | HEIGHT: 63 IN | TEMPERATURE: 98.4 F | SYSTOLIC BLOOD PRESSURE: 116 MMHG | WEIGHT: 145.83 LBS | OXYGEN SATURATION: 96 % | DIASTOLIC BLOOD PRESSURE: 78 MMHG | BODY MASS INDEX: 25.84 KG/M2

## 2025-07-17 DIAGNOSIS — C50.412 MALIGNANT NEOPLASM OF UPPER-OUTER QUADRANT OF LEFT BREAST IN FEMALE, ESTROGEN RECEPTOR POSITIVE (HCC): Primary | ICD-10-CM

## 2025-07-17 DIAGNOSIS — Z79.810 USE OF TAMOXIFEN (NOLVADEX): ICD-10-CM

## 2025-07-17 DIAGNOSIS — Z17.0 MALIGNANT NEOPLASM OF UPPER-OUTER QUADRANT OF LEFT BREAST IN FEMALE, ESTROGEN RECEPTOR POSITIVE (HCC): Primary | ICD-10-CM

## 2025-07-17 PROCEDURE — 99214 OFFICE O/P EST MOD 30 MIN: CPT | Performed by: NURSE PRACTITIONER

## 2025-07-17 PROCEDURE — 99212 OFFICE O/P EST SF 10 MIN: CPT | Performed by: NURSE PRACTITIONER

## 2025-07-17 ASSESSMENT — ENCOUNTER SYMPTOMS
SHORTNESS OF BREATH: 0
COUGH: 0
CONSTIPATION: 0
VOMITING: 0
BACK PAIN: 1
WEIGHT LOSS: 0
CHILLS: 0
PALPITATIONS: 0
NAUSEA: 0
DIARRHEA: 1
TINGLING: 1
DIAPHORESIS: 0
MYALGIAS: 1
FEVER: 0

## 2025-07-17 ASSESSMENT — PAIN SCALES - GENERAL: PAINLEVEL_OUTOF10: 7=MODERATE-SEVERE PAIN

## 2025-07-17 ASSESSMENT — FIBROSIS 4 INDEX: FIB4 SCORE: 0.94

## 2025-07-17 NOTE — LETTER
[address],   [address]  [place] [address]  Phone: [phone]  Fax: [phone]              Evelyn Mullins  1975    Encounter Date: 7/17/2025    RAY Contreras          PROGRESS NOTE:  Subjective     Boogie Mullins is a 50 y.o. female who presents with Breast Cancer (Schwartzberg/ 6 month FV)          HPI    Referring Physician: Cynthia Sanders M.D.   Primary Care:  Cynthia Sanders M.D.      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    Oncology history of presenting illness:  Evelyn Mullins is a 48 y.o. premenopausal white female who had a routine mammogram on 2/23/2023 that showed indeterminate calcifications in the left breast BI-RADS 4A. On 5/26/2023 she underwent a stereotactic biopsy of this area which showed DCIS with at least microinvasive ductal carcinoma, grade 2, ER positive greater than 90%, GA positive greater than 90%, Ki-67 less than 10%, HER2 indeterminate as there was not enough tissue to make a diagnosis. She has seen Dr. Au in consultation and will undergo partial mastectomy and sentinel lymph node biopsy next week. She is nulliparous with a somewhat irregular. But no hot flashes. No family history of breast cancer. Genetic testing is pending. She is extremely healthy other than hypothyroidism well controlled and runs ultramarathons.     Interval histories:  Interval history 7/12/2023.  She underwent partial mastectomy and sentinel lymph node biopsy on 6/27/2023.  Pathology showed invasive ductal carcinoma, grade 1, with extensive intermediate to  high-grade DCIS.  The invasive tumor measured 1.6 cm in greatest dimension.  Final margins were clear both invasive and noninvasive tumor.  1 lymph node was positive for isolated tumor cells in 1 lymph node was benign.  Postoperative course was unremarkable and she was back doing full exercise within a week after surgery.  HER2 is still pending.  She is seeing radiation and wishes to defer this till beginning of September due to social events that are planned.     Interval history 10/5/2023: HER2 came back 1+ negative.  Oncotype DX recurrence score was 18.  Based on this chemotherapy was not recommended and she proceeded with radiation therapy which she tolerated very well.  She has been extremely physically active and ran [place] before radiation therapy.  Her periods have been regular and shorter than previously and she missed her most recent cycle.  She denies any significant hot flashes or night sweats however.     Interval history 12/14/2023: She started tamoxifen in October 2023.  She has a feeling of being very hot at night which requires her to remove all her covers and then gets cold.  This happens most nights and is interrupting her sleep.  She does not have hot flashes during the day.  She had a vaginal discharge in the first few weeks and this has abated.  Otherwise she has no symptoms referable to tamoxifen.  Of note she has not had a period in several months now.     Interval history 5/30/2024: She is still having trouble with lymphedema and pain in the left axilla.  She is seeing Ela for treatments intermittently.  She has gained 10 pounds in the last 6 months and does not feel as comfortable in her body.  She did have spotting which is gone on intermittently this month with cramping.  These were the first menstrual related symptoms in several months.  Otherwise she is tolerating tamoxifen relatively well.  No severe hot flashes or night sweats.  She tried oxybutynin but briefly but  discontinued.  Diagnostic mammogram on 5/14/2024 was negative.     Interval history 11/26/2024 (BJ Durbin): Patient returns to clinic today for follow-up appointment. Patient denies any changes in her breast, including any new lumps or masses, skin dimpling, puckering, or erythema, nipple retraction or discharge, or any breast tenderness/discomfort.  She has followed up with her gynecologist Dr. Odonnell who recommends continuing on the tamoxifen at this time, and repeating her pelvic ultrasound in 6 months.  Patient states the ultrasound is scheduled for April 2025.  She denies any additional episodes of vaginal bleeding.  Patient reports that she does continue to have a left serious joint aches and pains while on the tamoxifen.  She is now seen physical therapy for help with the pain in her ankle, lower back, and shoulders.  She states this pain is so bad that it makes it very, very hard for her to exercise, and sometimes even to move.  She feels the pain may be related to the tamoxifen, and is unsure she can continue with this medication.  She also reports some ongoing tenderness at the incision site, as well as along the left side of her breast.  She was seeing physical therapy for her lymphedema in her left arm, but states that is now resolved.  No other complaints or concerns provided.     Interval history 1/7/2025 (BJ Durbin): Patient returns to clinic today for follow-up appointment.  Patient discontinued her tamoxifen in November 2024.  Patient states her lower back pain did improve for 1 to 2 weeks, but has now since returned.  Patient states that the pain might even be a little worse than before stopping the tamoxifen.  Patient states that she did notice crusting along her left nipple which she thinks may possibly be nipple discharge.  She did experience nipple discharge/flaking of the left nipple prior to her breast cancer diagnosis.  Patient is also very concerned as she is a  long-distance runner, and has had trouble completing her run since starting the tamoxifen.  She states she saw a , who recommended she be given iron to help with her endurance.  She continues to have ongoing tenderness along the left breast incision site, as well as along the left side of her breast.  She has seen physical therapy in the past for this discomfort, which has not really helped.  She is looking forward to her upcoming month-long trip to [facility] which is scheduled for next week.  No other complaints or concerns provided.     Interval history 2/7/2025 (Mary Grissom, AOCNP): Patient returns to clinic today for follow-up appointment via virtual visit.  Left breast diagnostic mammogram and ultrasound done 1/31/2025 was BI-RADS Category 2 benign.  DEXA scan done 1/31/2025 was normal.  Patient did return from a month-long trip to [facility], which she said was a wonderful vacation.  Patient restarted her tamoxifen at 5 mg daily on 1/10/2025.  Patient has now noted the return of night sweats, hot flashes, and significantly worsening lower back pain.  She states that when she stopped the full-strength tamoxifen the back pain improved, but did not completely dissipate.  Upon resumption of the low-dose tamoxifen, the back pain has again worsened.  She is again unable to run, do yoga, and participate in sports which she enjoys.  She continues to follow with her physical therapist, and has undergone reimaging of her lower back.  No source of her lower back pain has been identified.  Patient is extremely frustrated, as she does wish to remain on the tamoxifen to help lower her chance of cancer recurrence, however she states she is unable to continue on the medication for the next several years if her back pain continues as it is, and the pain continues to impact her life, by limiting her activities.    Interval history 07/17/25 (Joseph, APRN):  Since patient was seen in February 2025, she has  continued to take tamoxifen.  According to last note it was assumed that she was going to discontinue.  However patient did confirm that she has been taking the tamoxifen.  She is unable to cut the 10 mg tablets in half and therefore she has been doing 10 mg every other day.  She is continued to have severe pain in her pelvis and hips.  Left side is quite severe that she is having sciatic pain traveled down the back of her leg.  She was seen by pain specialist and has been on Lyrica.  However Lyrica does make her quite sedated.  She has attempted gabapentin in the past but that has upset her stomach.  With pain management she did undergo a lumbar spine nerve ablation with minimal relief.  She did get a steroid injection on the left side of the hip which did help somewhat but she does still continue to have the right sided pain.  She is due to have another steroid injection next week.  When off the tamoxifen for 6-week timeframe she did begin her menstrual cycle but since restarting back on tamoxifen that has stopped.  During conversation with patient she did have apparent shortness of breath what appears to be due to her pain.  She is unable to sit for more than 5 minutes and required standing for most of the appointment today.    Treatment history:  06/27/23: Left breast partial mastectomy and sentinel lymph node biopsy  09/05/23: Left breast radiation started  10/2023: Tamoxifen started -discontinued 11/26/2024 due to severe side effects  01/10/25: Tamoxifen resumed at 5 mg daily, stopped to 2/7/2025 due to recurrent, worsening back pain  07/17/25: Discontinue tamoxifen trial x 3 months    Allergies[1]    Medications Ordered Prior to Encounter[2]    Review of Systems   Constitutional:  Negative for chills, diaphoresis, fever, malaise/fatigue and weight loss.   Respiratory:  Negative for cough and shortness of breath.    Cardiovascular:  Negative for chest pain and palpitations.   Gastrointestinal:  Positive for  "diarrhea. Negative for constipation, nausea and vomiting.   Genitourinary:  Negative for dysuria.   Musculoskeletal:  Positive for back pain, joint pain and myalgias.   Neurological:  Positive for tingling.              Objective     /78   Pulse 79   Temp 36.9 °C (98.4 °F) (Temporal)   Resp 16   Ht 1.6 m (5' 2.99\")   Wt 66.2 kg (145 lb 13.4 oz)   SpO2 96%   BMI 25.84 kg/m²      Physical Exam  Vitals reviewed.   Constitutional:       General: She is not in acute distress.     Appearance: Normal appearance. She is not diaphoretic.   HENT:      Head: Normocephalic and atraumatic.   Cardiovascular:      Rate and Rhythm: Normal rate and regular rhythm.      Heart sounds: Normal heart sounds. No murmur heard.     No friction rub. No gallop.   Pulmonary:      Effort: Pulmonary effort is normal. No respiratory distress.      Breath sounds: Normal breath sounds. No wheezing.   Neurological:      Mental Status: She is alert.            MA-SCREENING MAMMO BILAT W/TOMOSYNTHESIS W/CAD  Result Date: 6/24/2025 6/24/2025 7:44 AM HISTORY/REASON FOR EXAM:  Routine Mammographic Screening. TECHNIQUE/EXAM DESCRIPTION: Bilateral tomosynthesis screening mammography was performed with standard mammographic images generated from the data set. Images were reviewed and interpreted with CAD. COMPARISON:   January 31, 2025, May 14, 2024, June 27, 2023, May 26, 2023, February 6, 2023 FINDINGS: The breasts are heterogeneously dense, which may obscure small masses. There is no dominant mass, suspicious calcification, or any secondary malignant sign. There is post surgical change in the left breast and left axilla. Surgical clips identified. A few scattered benign-appearing calcifications again noted.     1. No mammographic evidence of malignancy. 2. Screening mammogram in one year is recommended. R2 - CATEGORY 2: BENIGN FINDING(S) Ten to twenty percent of all cancers can be categorized as hereditary and the clinical and financial " value of identifying patients and families at risk is well documented. If you have a personal or family history of breast, ovarian, fallopian tube, peritoneal or other cancer, please consult your physician regarding genetic counseling and testing. [facility] is offering no cost genetic screening for hereditary breast and ovarian cancer. For more information, discuss with your provider, sign-up through [employer], or visit [email or web address] to learn more.           Assessment & Plan     1. Malignant neoplasm of upper-outer quadrant of left breast in female, estrogen receptor positive (HCC)        2. Use of tamoxifen (Nolvadex)                Assessment & Plan:     1.  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  Had partial mastectomy she had stage I cancer (pT1c, pN0i+), grade 1.  HER2 1+ negative.  Oncotype DX recurrence score 18.  On tamoxifen since October 2023 with significant musculoskeletal discomfort.  Tamoxifen discontinued November 2024, without complete resolution of musculoskeletal discomfort.  Patient is agreeable to restarting tamoxifen at a low dose, 5 mg daily, to see if this has any effect on her musculoskeletal pain.  After restarting tamoxifen 5 mg daily, patient again experienced significant lower back pain which limits her ability to run, do yoga, and engage in sports which she enjoys.  According to medical records it was decided that she would discontinue back in February 2025 however patient did not stop taking tamoxifen.  She was doing 10 mg every other day due to inability to cut the 10 mg tablets.  Pain is quite severe at this time in patient's quality of life is quite low.  Requested patient discontinue tamoxifen altogether at this time, 7/17/2025.  2.  Last mammogram done 1/31/2025 BI-RADS Category  2.  Next due at the end of January 2026.  3.  Uterine cramping and pelvic discharge.  Ultrasound done 10/24/2024 recommended follow-up in 3 to 6 months.  Patient to continue to follow with gynecology as recommended.  Pelvic ultrasound in April 2025 showed small nabothian cysts but otherwise normal-appearing uterus and ovaries.  Patient to continue to follow-up with gynecology.  4.  Normal CBC and iron studies.  5.  DEXA scan done 1/31/2025 normal.  Patient to follow-up with PCP for continued monitoring of bone health as risk for osteopenia/osteoporosis is not increased with the use of tamoxifen.      Plan:  Long discussion with patient today with regards to her pain.  It is uncertain if this is related to the tamoxifen but likely may be.  She is following with the pain specialist and due to undergo a steroid injection next week.  However based on the severity of pain she is experiencing that was quite apparent during the visit today patient's quality life is quite low.  I discussed discontinuing the tamoxifen at this time and have requested that she follow-up back in the clinic in 3 months to see how she is doing overall.    Prior to restarting in February 2025 while off the tamoxifen for 6 weeks patient did restart her menstrual cycle.  Will evaluate whether she restarts having a menstrual cycle or not.  Can potentially consider an AI in the future but will have to discuss this more in detail at her follow-up visit.    Patient did verbalize understanding's and agreed with the plan.      Please note that this dictation was created using voice recognition software. I have made every reasonable attempt to correct obvious errors, but I expect that there are errors of grammar and possibly content that I did not discover before finalizing the note.             [1]   Allergies  Allergen Reactions   • Pcn [Penicillins] Anaphylaxis   • Shellfish Allergy Swelling   [2]   Current Outpatient Medications on File Prior to  Encounter   Medication Sig Dispense Refill   • Calcium Citrate-Vitamin D (CALCIUM CITRATE + PO) Take 750 mg by mouth every day.     • Cholecalciferol (VITAMIN D-3 PO) Take 3,000 Units by mouth every day.     • CREATINE PO Take 5 g by mouth every day.     • pregabalin (LYRICA) 25 MG Cap Take 3 Capsules by mouth at bedtime for 30 days. 90 Capsule 2   • levothyroxine (SYNTHROID) 200 MCG Tab      • tamoxifen (NOLVADEX) 10 MG Tab Take 0.5 Tablets by mouth every day. 90 Tablet 3   • Omega-3 Fatty Acids (FISH OIL) 1000 MG Cap capsule Take 1,000 mg by mouth 3 times a day with meals.     • EPSOLAY 5 % cream      • Melatonin 1 MG/4ML Liquid      • Probiotic Product (PROBIOTIC + TURMERIC EXTRACT) 400 MG Cap      • glutamine 500 mg/mL oral suspension      • fluticasone (FLONASE) 50 MCG/ACT nasal spray Administer 1 Spray into affected nostril(S) every day.     • VITAMIN D PO Take 2,000 Int'l Units by mouth every day.     • MAGNESIUM PO Take 400 mg by mouth every day.     • multivitamin Tab Take 1 Tablet by mouth every day.     • Cetirizine HCl (ZYRTEC ALLERGY PO) Take 10 mg by mouth every day.       No current facility-administered medications on file prior to encounter.         Cynthia Sanders M.D.  [address] [address]  Via Fax: [phone]

## 2025-07-17 NOTE — LETTER
[address],   [address]  [place] [address]  Phone: [phone]  Fax: [phone]              Evelyn Mullins  1975    Encounter Date: 7/17/2025    RAY Contreras          PROGRESS NOTE:  Subjective     Boogie Mullins is a 50 y.o. female who presents with Breast Cancer ([facility]/ 6 month FV)          HPI    Referring Physician: Cynthia Sanders M.D.   Primary Care:  Cynthia Sanders M.D.      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    Oncology history of presenting illness:  Evelyn Mullins is a 48 y.o. premenopausal white female who had a routine mammogram on 2/23/2023 that showed indeterminate calcifications in the left breast BI-RADS 4A. On 5/26/2023 she underwent a stereotactic biopsy of this area which showed DCIS with at least microinvasive ductal carcinoma, grade 2, ER positive greater than 90%, MS positive greater than 90%, Ki-67 less than 10%, HER2 indeterminate as there was not enough tissue to make a diagnosis. She has seen Dr. Au in consultation and will undergo partial mastectomy and sentinel lymph node biopsy next week. She is nulliparous with a somewhat irregular. But no hot flashes. No family history of breast cancer. Genetic testing is pending. She is extremely healthy other than hypothyroidism well controlled and runs ultramarathons.     Interval histories:  Interval history 7/12/2023.  She underwent partial mastectomy and sentinel lymph node biopsy on 6/27/2023.  Pathology showed invasive ductal carcinoma, grade 1, with extensive intermediate to  high-grade DCIS.  The invasive tumor measured 1.6 cm in greatest dimension.  Final margins were clear both invasive and noninvasive tumor.  1 lymph node was positive for isolated tumor cells in 1 lymph node was benign.  Postoperative course was unremarkable and she was back doing full exercise within a week after surgery.  HER2 is still pending.  She is seeing radiation and wishes to defer this till beginning of September due to social events that are planned.     Interval history 10/5/2023: HER2 came back 1+ negative.  Oncotype DX recurrence score was 18.  Based on this chemotherapy was not recommended and she proceeded with radiation therapy which she tolerated very well.  She has been extremely physically active and ran [employer] before radiation therapy.  Her periods have been regular and shorter than previously and she missed her most recent cycle.  She denies any significant hot flashes or night sweats however.     Interval history 12/14/2023: She started tamoxifen in October 2023.  She has a feeling of being very hot at night which requires her to remove all her covers and then gets cold.  This happens most nights and is interrupting her sleep.  She does not have hot flashes during the day.  She had a vaginal discharge in the first few weeks and this has abated.  Otherwise she has no symptoms referable to tamoxifen.  Of note she has not had a period in several months now.     Interval history 5/30/2024: She is still having trouble with lymphedema and pain in the left axilla.  She is seeing Ela for treatments intermittently.  She has gained 10 pounds in the last 6 months and does not feel as comfortable in her body.  She did have spotting which is gone on intermittently this month with cramping.  These were the first menstrual related symptoms in several months.  Otherwise she is tolerating tamoxifen relatively well.  No severe hot flashes or night sweats.  She tried oxybutynin but briefly but  discontinued.  Diagnostic mammogram on 5/14/2024 was negative.     Interval history 11/26/2024 (BJ Durbin): Patient returns to clinic today for follow-up appointment. Patient denies any changes in her breast, including any new lumps or masses, skin dimpling, puckering, or erythema, nipple retraction or discharge, or any breast tenderness/discomfort.  She has followed up with her gynecologist Dr. Odonnell who recommends continuing on the tamoxifen at this time, and repeating her pelvic ultrasound in 6 months.  Patient states the ultrasound is scheduled for April 2025.  She denies any additional episodes of vaginal bleeding.  Patient reports that she does continue to have a left serious joint aches and pains while on the tamoxifen.  She is now seen physical therapy for help with the pain in her ankle, lower back, and shoulders.  She states this pain is so bad that it makes it very, very hard for her to exercise, and sometimes even to move.  She feels the pain may be related to the tamoxifen, and is unsure she can continue with this medication.  She also reports some ongoing tenderness at the incision site, as well as along the left side of her breast.  She was seeing physical therapy for her lymphedema in her left arm, but states that is now resolved.  No other complaints or concerns provided.     Interval history 1/7/2025 (BJ Durbin): Patient returns to clinic today for follow-up appointment.  Patient discontinued her tamoxifen in November 2024.  Patient states her lower back pain did improve for 1 to 2 weeks, but has now since returned.  Patient states that the pain might even be a little worse than before stopping the tamoxifen.  Patient states that she did notice crusting along her left nipple which she thinks may possibly be nipple discharge.  She did experience nipple discharge/flaking of the left nipple prior to her breast cancer diagnosis.  Patient is also very concerned as she is a  long-distance runner, and has had trouble completing her run since starting the tamoxifen.  She states she saw a , who recommended she be given iron to help with her endurance.  She continues to have ongoing tenderness along the left breast incision site, as well as along the left side of her breast.  She has seen physical therapy in the past for this discomfort, which has not really helped.  She is looking forward to her upcoming month-long trip to [facility] which is scheduled for next week.  No other complaints or concerns provided.     Interval history 2/7/2025 (Mary Grissom, AOCNP): Patient returns to clinic today for follow-up appointment via virtual visit.  Left breast diagnostic mammogram and ultrasound done 1/31/2025 was BI-RADS Category 2 benign.  DEXA scan done 1/31/2025 was normal.  Patient did return from a month-long trip to [facility], which she said was a wonderful vacation.  Patient restarted her tamoxifen at 5 mg daily on 1/10/2025.  Patient has now noted the return of night sweats, hot flashes, and significantly worsening lower back pain.  She states that when she stopped the full-strength tamoxifen the back pain improved, but did not completely dissipate.  Upon resumption of the low-dose tamoxifen, the back pain has again worsened.  She is again unable to run, do yoga, and participate in sports which she enjoys.  She continues to follow with her physical therapist, and has undergone reimaging of her lower back.  No source of her lower back pain has been identified.  Patient is extremely frustrated, as she does wish to remain on the tamoxifen to help lower her chance of cancer recurrence, however she states she is unable to continue on the medication for the next several years if her back pain continues as it is, and the pain continues to impact her life, by limiting her activities.    Interval history 07/17/25 (Joseph, APRN):  Since patient was seen in February 2025, she has  continued to take tamoxifen.  According to last note it was assumed that she was going to discontinue.  However patient did confirm that she has been taking the tamoxifen.  She is unable to cut the 10 mg tablets in half and therefore she has been doing 10 mg every other day.  She is continued to have severe pain in her pelvis and hips.  Left side is quite severe that she is having sciatic pain traveled down the back of her leg.  She was seen by pain specialist and has been on Lyrica.  However Lyrica does make her quite sedated.  She has attempted gabapentin in the past but that has upset her stomach.  With pain management she did undergo a lumbar spine nerve ablation with minimal relief.  She did get a steroid injection on the left side of the hip which did help somewhat but she does still continue to have the right sided pain.  She is due to have another steroid injection next week.  When off the tamoxifen for 6-week timeframe she did begin her menstrual cycle but since restarting back on tamoxifen that has stopped.  During conversation with patient she did have apparent shortness of breath what appears to be due to her pain.  She is unable to sit for more than 5 minutes and required standing for most of the appointment today.    Treatment history:  06/27/23: Left breast partial mastectomy and sentinel lymph node biopsy  09/05/23: Left breast radiation started  10/2023: Tamoxifen started -discontinued 11/26/2024 due to severe side effects  01/10/25: Tamoxifen resumed at 5 mg daily, stopped to 2/7/2025 due to recurrent, worsening back pain  07/17/25: Discontinue tamoxifen trial x 3 months    Allergies[1]    Medications Ordered Prior to Encounter[2]    Review of Systems   Constitutional:  Negative for chills, diaphoresis, fever, malaise/fatigue and weight loss.   Respiratory:  Negative for cough and shortness of breath.    Cardiovascular:  Negative for chest pain and palpitations.   Gastrointestinal:  Positive for  "diarrhea. Negative for constipation, nausea and vomiting.   Genitourinary:  Negative for dysuria.   Musculoskeletal:  Positive for back pain, joint pain and myalgias.   Neurological:  Positive for tingling.              Objective     /78   Pulse 79   Temp 36.9 °C (98.4 °F) (Temporal)   Resp 16   Ht 1.6 m (5' 2.99\")   Wt 66.2 kg (145 lb 13.4 oz)   SpO2 96%   BMI 25.84 kg/m²      Physical Exam  Vitals reviewed.   Constitutional:       General: She is not in acute distress.     Appearance: Normal appearance. She is not diaphoretic.   HENT:      Head: Normocephalic and atraumatic.   Cardiovascular:      Rate and Rhythm: Normal rate and regular rhythm.      Heart sounds: Normal heart sounds. No murmur heard.     No friction rub. No gallop.   Pulmonary:      Effort: Pulmonary effort is normal. No respiratory distress.      Breath sounds: Normal breath sounds. No wheezing.   Neurological:      Mental Status: She is alert.            MA-SCREENING MAMMO BILAT W/TOMOSYNTHESIS W/CAD  Result Date: 6/24/2025 6/24/2025 7:44 AM HISTORY/REASON FOR EXAM:  Routine Mammographic Screening. TECHNIQUE/EXAM DESCRIPTION: Bilateral tomosynthesis screening mammography was performed with standard mammographic images generated from the data set. Images were reviewed and interpreted with CAD. COMPARISON:   January 31, 2025, May 14, 2024, June 27, 2023, May 26, 2023, February 6, 2023 FINDINGS: The breasts are heterogeneously dense, which may obscure small masses. There is no dominant mass, suspicious calcification, or any secondary malignant sign. There is post surgical change in the left breast and left axilla. Surgical clips identified. A few scattered benign-appearing calcifications again noted.     1. No mammographic evidence of malignancy. 2. Screening mammogram in one year is recommended. R2 - CATEGORY 2: BENIGN FINDING(S) Ten to twenty percent of all cancers can be categorized as hereditary and the clinical and financial " value of identifying patients and families at risk is well documented. If you have a personal or family history of breast, ovarian, fallopian tube, peritoneal or other cancer, please consult your physician regarding genetic counseling and testing. [facility] [employer] is offering no cost genetic screening for hereditary breast and ovarian cancer. For more information, discuss with your provider, sign-up through [facility], or visit [email or web address] to learn more.           Assessment & Plan     1. Malignant neoplasm of upper-outer quadrant of left breast in female, estrogen receptor positive (HCC)        2. Use of tamoxifen (Nolvadex)                Assessment & Plan:     1.  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  Had partial mastectomy she had stage I cancer (pT1c, pN0i+), grade 1.  HER2 1+ negative.  Oncotype DX recurrence score 18.  On tamoxifen since October 2023 with significant musculoskeletal discomfort.  Tamoxifen discontinued November 2024, without complete resolution of musculoskeletal discomfort.  Patient is agreeable to restarting tamoxifen at a low dose, 5 mg daily, to see if this has any effect on her musculoskeletal pain.  After restarting tamoxifen 5 mg daily, patient again experienced significant lower back pain which limits her ability to run, do yoga, and engage in sports which she enjoys.  According to medical records it was decided that she would discontinue back in February 2025 however patient did not stop taking tamoxifen.  She was doing 10 mg every other day due to inability to cut the 10 mg tablets.  Pain is quite severe at this time in patient's quality of life is quite low.  Requested patient discontinue tamoxifen altogether at this time, 7/17/2025.  2.  Last mammogram done 1/31/2025 BI-RADS Category  2.  Next due at the end of January 2026.  3.  Uterine cramping and pelvic discharge.  Ultrasound done 10/24/2024 recommended follow-up in 3 to 6 months.  Patient to continue to follow with gynecology as recommended.  Pelvic ultrasound in April 2025 showed small nabothian cysts but otherwise normal-appearing uterus and ovaries.  Patient to continue to follow-up with gynecology.  4.  Normal CBC and iron studies.  5.  DEXA scan done 1/31/2025 normal.  Patient to follow-up with PCP for continued monitoring of bone health.      Plan:  Long discussion with patient today with regards to her pain.  It is uncertain if this is related to the tamoxifen but likely may be.  She is following with the pain specialist and due to undergo a steroid injection next week.  However based on the severity of pain she is experiencing that was quite apparent during the visit today patient's quality life is quite low.  I discussed discontinuing the tamoxifen at this time and have requested that she follow-up back in the clinic in 3 months to see how she is doing overall.    Prior to restarting in February 2025 while off the tamoxifen for 6 weeks patient did restart her menstrual cycle.  Will evaluate whether she restarts having a menstrual cycle or not.  Can potentially consider an AI in the future but will have to discuss this more in detail at her follow-up visit.    Patient did verbalize understanding's and agreed with the plan.      Please note that this dictation was created using voice recognition software. I have made every reasonable attempt to correct obvious errors, but I expect that there are errors of grammar and possibly content that I did not discover before finalizing the note.                Cynthia Sanders M.D.  [address] [address]  Via Fax: [phone]                   [1]  Allergies  Allergen Reactions    Pcn [Penicillins] Anaphylaxis    Shellfish Allergy Swelling   [2]  Current Outpatient  Medications on File Prior to Encounter   Medication Sig Dispense Refill    Calcium Citrate-Vitamin D (CALCIUM CITRATE + PO) Take 750 mg by mouth every day.      Cholecalciferol (VITAMIN D-3 PO) Take 3,000 Units by mouth every day.      CREATINE PO Take 5 g by mouth every day.      pregabalin (LYRICA) 25 MG Cap Take 3 Capsules by mouth at bedtime for 30 days. 90 Capsule 2    levothyroxine (SYNTHROID) 200 MCG Tab       tamoxifen (NOLVADEX) 10 MG Tab Take 0.5 Tablets by mouth every day. 90 Tablet 3    Omega-3 Fatty Acids (FISH OIL) 1000 MG Cap capsule Take 1,000 mg by mouth 3 times a day with meals.      EPSOLAY 5 % cream       Melatonin 1 MG/4ML Liquid       Probiotic Product (PROBIOTIC + TURMERIC EXTRACT) 400 MG Cap       glutamine 500 mg/mL oral suspension       fluticasone (FLONASE) 50 MCG/ACT nasal spray Administer 1 Spray into affected nostril(S) every day.      VITAMIN D PO Take 2,000 Int'l Units by mouth every day.      MAGNESIUM PO Take 400 mg by mouth every day.      multivitamin Tab Take 1 Tablet by mouth every day.      Cetirizine HCl (ZYRTEC ALLERGY PO) Take 10 mg by mouth every day.       No current facility-administered medications on file prior to encounter.

## 2025-07-17 NOTE — PROGRESS NOTES
Subjective     Boogie Mullins is a 50 y.o. female who presents with Breast Cancer ([place]/ 6 month FV)          HPI    Referring Physician: Cynthia Sanders M.D.   Primary Care:  Cynthia Sanders M.D.      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    Oncology history of presenting illness:  Evelyn Mullins is a 48 y.o. premenopausal white female who had a routine mammogram on 2/23/2023 that showed indeterminate calcifications in the left breast BI-RADS 4A. On 5/26/2023 she underwent a stereotactic biopsy of this area which showed DCIS with at least microinvasive ductal carcinoma, grade 2, ER positive greater than 90%, HI positive greater than 90%, Ki-67 less than 10%, HER2 indeterminate as there was not enough tissue to make a diagnosis. She has seen Dr. Au in consultation and will undergo partial mastectomy and sentinel lymph node biopsy next week. She is nulliparous with a somewhat irregular. But no hot flashes. No family history of breast cancer. Genetic testing is pending. She is extremely healthy other than hypothyroidism well controlled and runs ultramarathons.     Interval histories:  Interval history 7/12/2023.  She underwent partial mastectomy and sentinel lymph node biopsy on 6/27/2023.  Pathology showed invasive ductal carcinoma, grade 1, with extensive intermediate to high-grade DCIS.  The invasive tumor measured 1.6 cm in greatest dimension.  Final margins were clear both invasive and noninvasive tumor.  1 lymph node was positive for isolated tumor cells in 1 lymph node was benign.  Postoperative course  was unremarkable and she was back doing full exercise within a week after surgery.  HER2 is still pending.  She is seeing radiation and wishes to defer this till beginning of September due to social events that are planned.     Interval history 10/5/2023: HER2 came back 1+ negative.  Oncotype DX recurrence score was 18.  Based on this chemotherapy was not recommended and she proceeded with radiation therapy which she tolerated very well.  She has been extremely physically active and ran CDEL before radiation therapy.  Her periods have been regular and shorter than previously and she missed her most recent cycle.  She denies any significant hot flashes or night sweats however.     Interval history 12/14/2023: She started tamoxifen in October 2023.  She has a feeling of being very hot at night which requires her to remove all her covers and then gets cold.  This happens most nights and is interrupting her sleep.  She does not have hot flashes during the day.  She had a vaginal discharge in the first few weeks and this has abated.  Otherwise she has no symptoms referable to tamoxifen.  Of note she has not had a period in several months now.     Interval history 5/30/2024: She is still having trouble with lymphedema and pain in the left axilla.  She is seeing Ela for treatments intermittently.  She has gained 10 pounds in the last 6 months and does not feel as comfortable in her body.  She did have spotting which is gone on intermittently this month with cramping.  These were the first menstrual related symptoms in several months.  Otherwise she is tolerating tamoxifen relatively well.  No severe hot flashes or night sweats.  She tried oxybutynin but briefly but discontinued.  Diagnostic mammogram on 5/14/2024 was negative.     Interval history 11/26/2024 (Mary Grissom, BJ): Patient returns to clinic today for follow-up appointment. Patient denies any changes in her breast, including any new lumps or  masses, skin dimpling, puckering, or erythema, nipple retraction or discharge, or any breast tenderness/discomfort.  She has followed up with her gynecologist Dr. Odonnell who recommends continuing on the tamoxifen at this time, and repeating her pelvic ultrasound in 6 months.  Patient states the ultrasound is scheduled for April 2025.  She denies any additional episodes of vaginal bleeding.  Patient reports that she does continue to have a left serious joint aches and pains while on the tamoxifen.  She is now seen physical therapy for help with the pain in her ankle, lower back, and shoulders.  She states this pain is so bad that it makes it very, very hard for her to exercise, and sometimes even to move.  She feels the pain may be related to the tamoxifen, and is unsure she can continue with this medication.  She also reports some ongoing tenderness at the incision site, as well as along the left side of her breast.  She was seeing physical therapy for her lymphedema in her left arm, but states that is now resolved.  No other complaints or concerns provided.     Interval history 1/7/2025 (Mary Grissom, BJ): Patient returns to clinic today for follow-up appointment.  Patient discontinued her tamoxifen in November 2024.  Patient states her lower back pain did improve for 1 to 2 weeks, but has now since returned.  Patient states that the pain might even be a little worse than before stopping the tamoxifen.  Patient states that she did notice crusting along her left nipple which she thinks may possibly be nipple discharge.  She did experience nipple discharge/flaking of the left nipple prior to her breast cancer diagnosis.  Patient is also very concerned as she is a long-distance runner, and has had trouble completing her run since starting the tamoxifen.  She states she saw a , who recommended she be given iron to help with her endurance.  She continues to have ongoing tenderness along the left  breast incision site, as well as along the left side of her breast.  She has seen physical therapy in the past for this discomfort, which has not really helped.  She is looking forward to her upcoming month-long trip to [place] which is scheduled for next week.  No other complaints or concerns provided.     Interval history 2/7/2025 (Mary Grissom, BJ): Patient returns to clinic today for follow-up appointment via virtual visit.  Left breast diagnostic mammogram and ultrasound done 1/31/2025 was BI-RADS Category 2 benign.  DEXA scan done 1/31/2025 was normal.  Patient did return from a month-long trip to [place], which she said was a wonderful vacation.  Patient restarted her tamoxifen at 5 mg daily on 1/10/2025.  Patient has now noted the return of night sweats, hot flashes, and significantly worsening lower back pain.  She states that when she stopped the full-strength tamoxifen the back pain improved, but did not completely dissipate.  Upon resumption of the low-dose tamoxifen, the back pain has again worsened.  She is again unable to run, do yoga, and participate in sports which she enjoys.  She continues to follow with her physical therapist, and has undergone reimaging of her lower back.  No source of her lower back pain has been identified.  Patient is extremely frustrated, as she does wish to remain on the tamoxifen to help lower her chance of cancer recurrence, however she states she is unable to continue on the medication for the next several years if her back pain continues as it is, and the pain continues to impact her life, by limiting her activities.    Interval history 07/17/25 (Joseph, APRN):  Since patient was seen in February 2025, she has continued to take tamoxifen.  According to last note it was assumed that she was going to discontinue.  However patient did confirm that she has been taking the tamoxifen.  She is unable to cut the 10 mg tablets in half and therefore she has been doing  10 mg every other day.  She is continued to have severe pain in her pelvis and hips.  Left side is quite severe that she is having sciatic pain traveled down the back of her leg.  She was seen by pain specialist and has been on Lyrica.  However Lyrica does make her quite sedated.  She has attempted gabapentin in the past but that has upset her stomach.  With pain management she did undergo a lumbar spine nerve ablation with minimal relief.  She did get a steroid injection on the left side of the hip which did help somewhat but she does still continue to have the right sided pain.  She is due to have another steroid injection next week.  When off the tamoxifen for 6-week timeframe she did begin her menstrual cycle but since restarting back on tamoxifen that has stopped.  During conversation with patient she did have apparent shortness of breath what appears to be due to her pain.  She is unable to sit for more than 5 minutes and required standing for most of the appointment today.    Treatment history:  06/27/23: Left breast partial mastectomy and sentinel lymph node biopsy  09/05/23: Left breast radiation started  10/2023: Tamoxifen started -discontinued 11/26/2024 due to severe side effects  01/10/25: Tamoxifen resumed at 5 mg daily, stopped to 2/7/2025 due to recurrent, worsening back pain  07/17/25: Discontinue tamoxifen trial x 3 months    Allergies[1]    Medications Ordered Prior to Encounter[2]    Review of Systems   Constitutional:  Negative for chills, diaphoresis, fever, malaise/fatigue and weight loss.   Respiratory:  Negative for cough and shortness of breath.    Cardiovascular:  Negative for chest pain and palpitations.   Gastrointestinal:  Positive for diarrhea. Negative for constipation, nausea and vomiting.   Genitourinary:  Negative for dysuria.   Musculoskeletal:  Positive for back pain, joint pain and myalgias.   Neurological:  Positive for tingling.              Objective     /78   Pulse 79  "  Temp 36.9 °C (98.4 °F) (Temporal)   Resp 16   Ht 1.6 m (5' 2.99\")   Wt 66.2 kg (145 lb 13.4 oz)   SpO2 96%   BMI 25.84 kg/m²      Physical Exam  Vitals reviewed.   Constitutional:       General: She is not in acute distress.     Appearance: Normal appearance. She is not diaphoretic.   HENT:      Head: Normocephalic and atraumatic.   Cardiovascular:      Rate and Rhythm: Normal rate and regular rhythm.      Heart sounds: Normal heart sounds. No murmur heard.     No friction rub. No gallop.   Pulmonary:      Effort: Pulmonary effort is normal. No respiratory distress.      Breath sounds: Normal breath sounds. No wheezing.   Neurological:      Mental Status: She is alert.            MA-SCREENING MAMMO BILAT W/TOMOSYNTHESIS W/CAD  Result Date: 6/24/2025 6/24/2025 7:44 AM HISTORY/REASON FOR EXAM:  Routine Mammographic Screening. TECHNIQUE/EXAM DESCRIPTION: Bilateral tomosynthesis screening mammography was performed with standard mammographic images generated from the data set. Images were reviewed and interpreted with CAD. COMPARISON:   January 31, 2025, May 14, 2024, June 27, 2023, May 26, 2023, February 6, 2023 FINDINGS: The breasts are heterogeneously dense, which may obscure small masses. There is no dominant mass, suspicious calcification, or any secondary malignant sign. There is post surgical change in the left breast and left axilla. Surgical clips identified. A few scattered benign-appearing calcifications again noted.     1. No mammographic evidence of malignancy. 2. Screening mammogram in one year is recommended. R2 - CATEGORY 2: BENIGN FINDING(S) Ten to twenty percent of all cancers can be categorized as hereditary and the clinical and financial value of identifying patients and families at risk is well documented. If you have a personal or family history of breast, ovarian, fallopian tube, peritoneal or other cancer, please consult your physician regarding genetic counseling and testing. [facility] " [employer] [place] [facility] is offering no cost genetic screening for hereditary breast and ovarian cancer. For more information, discuss with your provider, sign-up through [employer] [facility], or visit [email or web address] to learn more.           Assessment & Plan     1. Malignant neoplasm of upper-outer quadrant of left breast in female, estrogen receptor positive (HCC)        2. Use of tamoxifen (Nolvadex)                Assessment & Plan:     1.  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  Had partial mastectomy she had stage I cancer (pT1c, pN0i+), grade 1.  HER2 1+ negative.  Oncotype DX recurrence score 18.  On tamoxifen since October 2023 with significant musculoskeletal discomfort.  Tamoxifen discontinued November 2024, without complete resolution of musculoskeletal discomfort.  Patient is agreeable to restarting tamoxifen at a low dose, 5 mg daily, to see if this has any effect on her musculoskeletal pain.  After restarting tamoxifen 5 mg daily, patient again experienced significant lower back pain which limits her ability to run, do yoga, and engage in sports which she enjoys.  According to medical records it was decided that she would discontinue back in February 2025 however patient did not stop taking tamoxifen.  She was doing 10 mg every other day due to inability to cut the 10 mg tablets.  Pain is quite severe at this time in patient's quality of life is quite low.  Requested patient discontinue tamoxifen altogether at this time, 7/17/2025.  2.  Last mammogram done 1/31/2025 BI-RADS Category 2.  Next due at the end of January 2026.  3.  Uterine cramping and pelvic discharge.  Ultrasound done 10/24/2024 recommended follow-up in 3 to 6 months.  Patient to continue to follow with gynecology as recommended.  Pelvic ultrasound in April 2025 showed  small nabothian cysts but otherwise normal-appearing uterus and ovaries.  Patient to continue to follow-up with gynecology.  4.  Normal CBC and iron studies.  5.  DEXA scan done 1/31/2025 normal.  Patient to follow-up with PCP for continued monitoring of bone health as risk for osteopenia/osteoporosis is not increased with the use of tamoxifen.      Plan:  Long discussion with patient today with regards to her pain.  It is uncertain if this is related to the tamoxifen but likely may be.  She is following with the pain specialist and due to undergo a steroid injection next week.  However based on the severity of pain she is experiencing that was quite apparent during the visit today patient's quality life is quite low.  I discussed discontinuing the tamoxifen at this time and have requested that she follow-up back in the clinic in 3 months to see how she is doing overall.    Prior to restarting in February 2025 while off the tamoxifen for 6 weeks patient did restart her menstrual cycle.  Will evaluate whether she restarts having a menstrual cycle or not.  Can potentially consider an AI in the future but will have to discuss this more in detail at her follow-up visit.    Patient did verbalize understanding's and agreed with the plan.      Please note that this dictation was created using voice recognition software. I have made every reasonable attempt to correct obvious errors, but I expect that there are errors of grammar and possibly content that I did not discover before finalizing the note.             [1]   Allergies  Allergen Reactions    Pcn [Penicillins] Anaphylaxis    Shellfish Allergy Swelling   [2]   Current Outpatient Medications on File Prior to Encounter   Medication Sig Dispense Refill    Calcium Citrate-Vitamin D (CALCIUM CITRATE + PO) Take 750 mg by mouth every day.      Cholecalciferol (VITAMIN D-3 PO) Take 3,000 Units by mouth every day.      CREATINE PO Take 5 g by mouth every day.      pregabalin  (LYRICA) 25 MG Cap Take 3 Capsules by mouth at bedtime for 30 days. 90 Capsule 2    levothyroxine (SYNTHROID) 200 MCG Tab       tamoxifen (NOLVADEX) 10 MG Tab Take 0.5 Tablets by mouth every day. 90 Tablet 3    Omega-3 Fatty Acids (FISH OIL) 1000 MG Cap capsule Take 1,000 mg by mouth 3 times a day with meals.      EPSOLAY 5 % cream       Melatonin 1 MG/4ML Liquid       Probiotic Product (PROBIOTIC + TURMERIC EXTRACT) 400 MG Cap       glutamine 500 mg/mL oral suspension       fluticasone (FLONASE) 50 MCG/ACT nasal spray Administer 1 Spray into affected nostril(S) every day.      VITAMIN D PO Take 2,000 Int'l Units by mouth every day.      MAGNESIUM PO Take 400 mg by mouth every day.      multivitamin Tab Take 1 Tablet by mouth every day.      Cetirizine HCl (ZYRTEC ALLERGY PO) Take 10 mg by mouth every day.       No current facility-administered medications on file prior to encounter.

## 2025-07-17 NOTE — Clinical Note
July 17, 2025              Boogie Mullins is currently being cared for at [facility].  Her hours/activities need to be modified.  She should not lift over 20 pounds repetitively.          Thank you,          SADIQ Contreras.[email or web address].    Electronically Signed

## 2025-07-18 NOTE — ADDENDUM NOTE
Encounter addended by: Dang Dillon, Med Ass't on: 7/17/2025 5:09 PM   Actions taken: Charge Capture section accepted

## 2025-07-23 ENCOUNTER — HOSPITAL ENCOUNTER (OUTPATIENT)
Facility: REHABILITATION | Age: 50
End: 2025-07-23
Attending: STUDENT IN AN ORGANIZED HEALTH CARE EDUCATION/TRAINING PROGRAM | Admitting: STUDENT IN AN ORGANIZED HEALTH CARE EDUCATION/TRAINING PROGRAM
Payer: COMMERCIAL

## 2025-07-23 ENCOUNTER — APPOINTMENT (OUTPATIENT)
Dept: RADIOLOGY | Facility: REHABILITATION | Age: 50
End: 2025-07-23
Attending: STUDENT IN AN ORGANIZED HEALTH CARE EDUCATION/TRAINING PROGRAM
Payer: COMMERCIAL

## 2025-07-23 VITALS
DIASTOLIC BLOOD PRESSURE: 87 MMHG | OXYGEN SATURATION: 99 % | RESPIRATION RATE: 16 BRPM | BODY MASS INDEX: 25.39 KG/M2 | WEIGHT: 143.3 LBS | SYSTOLIC BLOOD PRESSURE: 121 MMHG | TEMPERATURE: 97.7 F | HEART RATE: 67 BPM | HEIGHT: 63 IN

## 2025-07-23 PROCEDURE — 700111 HCHG RX REV CODE 636 W/ 250 OVERRIDE (IP): Mod: JZ

## 2025-07-23 PROCEDURE — A9579 GAD-BASE MR CONTRAST NOS,1ML: HCPCS | Mod: JZ

## 2025-07-23 PROCEDURE — 700117 HCHG RX CONTRAST REV CODE 255: Mod: JZ

## 2025-07-23 PROCEDURE — 77002 NEEDLE LOCALIZATION BY XRAY: CPT

## 2025-07-23 PROCEDURE — G0260 INJ FOR SACROILIAC JT ANESTH: HCPCS

## 2025-07-23 PROCEDURE — 27096 INJECT SACROILIAC JOINT: CPT | Mod: PO,RT

## 2025-07-23 RX ORDER — GADOTERIDOL 279.3 MG/ML
INJECTION INTRAVENOUS
Status: COMPLETED
Start: 2025-07-23 | End: 2025-07-23

## 2025-07-23 RX ORDER — DEXAMETHASONE SODIUM PHOSPHATE 10 MG/ML
INJECTION, SOLUTION INTRAMUSCULAR; INTRAVENOUS
Status: COMPLETED
Start: 2025-07-23 | End: 2025-07-23

## 2025-07-23 RX ORDER — LIDOCAINE HYDROCHLORIDE 10 MG/ML
INJECTION, SOLUTION EPIDURAL; INFILTRATION; INTRACAUDAL; PERINEURAL
Status: COMPLETED
Start: 2025-07-23 | End: 2025-07-23

## 2025-07-23 RX ADMIN — LIDOCAINE HYDROCHLORIDE 10 ML: 10 INJECTION, SOLUTION EPIDURAL; INFILTRATION; INTRACAUDAL; PERINEURAL at 15:34

## 2025-07-23 RX ADMIN — GADOTERIDOL 5 ML: 279.3 INJECTION, SOLUTION INTRAVENOUS at 15:34

## 2025-07-23 RX ADMIN — DEXAMETHASONE SODIUM PHOSPHATE 10 MG: 10 INJECTION, SOLUTION INTRAMUSCULAR; INTRAVENOUS at 15:34

## 2025-07-23 ASSESSMENT — PAIN DESCRIPTION - PAIN TYPE: TYPE: CHRONIC PAIN

## 2025-07-23 ASSESSMENT — FIBROSIS 4 INDEX: FIB4 SCORE: 0.94

## 2025-07-23 NOTE — INTERVAL H&P NOTE
Consented Procedure: RIGHT sacroiliac joint injection with fluoroscopic guidance  I have examined the patient, provided the risks, benefits, and alternatives to the procedure(s) indicated on the signed consent form, and the patient wishes to proceed.    H&P reviewed. The patient was examined and there are no changes to the H&P      Doris Nevarez M.D.  07/23/25 2:53 PM

## 2025-07-23 NOTE — PROGRESS NOTES
1435 Pt arrived to pre-procedure area. Procedure & plan for recovery reviewed, site confirmed, & consent signed. Allergies & current medications verified. Appointed  waiting in lobby. Printed discharge instructions discussed & signed. MD to bedside prior to procedure.     1543 Pt to recovery area s/p SI joint injection & updates received from procedure RN. Dr. Nevarez to bedside for post-procedure evaluation.      1554 Pt ambulates without difficulty & meets DC criteria. RN assisted pt off unit to appointed .

## 2025-07-23 NOTE — OP REPORT
Date of Service: 7/23/2025    Patient: Evelyn Mullins 50 y.o. female     MRN: [id number]     Physician/s: Doris Nevarez MD    Pre-operative Diagnosis: right Sacroiliac dysfunction    Post-operative Diagnosis: right Sacroiliac dysfunction    Procedure: right Sacroiliac joint injection    Description of procedure:    The risks, benefits, and alternatives of the procedure were reviewed and discussed with the patient.  Written informed consent was freely obtained. A pre-procedural time-out was conducted by the physician verifying patient’s identity, procedure to be performed, procedure site and side, and allergy verification. Appropriate equipment was determined to be in place for the procedure.     In the fluoroscopy suite the patient was placed in a prone position and the skin was prepped and draped in the usual sterile fashion. The fluoroscope was placed over the right sacroiliac joint at the appropriate angle for joint entrance, and the target for injection was marked. A 27g needle was placed into each of the marked level(s), and approx 2cc of 1% Lidocaine was injected subcutaneously into the epidermal and dermal layers. The needle was removed. A 25g 3.5 inch needle was then placed down to the level of bone at the inferior/distal aspect of the joint. The needle was then retracted and carefully advanced into the joint capsule. The needle tip was then verified by a lateral view. In the AP view, contrast dye was used to highlight the joint line while the fluoroscope was running live. Following negative aspiration, approx 1mL of 1% lidocaine with 1mL of 10 mg/mL dexamethasone was then injected. The needle was removed intact after being restyleted. The patient's low back was covered with a 4x4 gauze, the area was cleansed with sterile normal saline, and a dressing was applied. There were no complications noted.     Follow-up as scheduled    Pain score prior to injection: 7/10 on NRS    Doris Nevarez MD  Interventional  Pain Management  Physical Medicine and Rehabilitation  [facility]  7/23/2025        CPT  sacroiliac joint with fluoroscopy [id number]

## 2025-08-19 ENCOUNTER — OFFICE VISIT (OUTPATIENT)
Dept: PHYSICAL MEDICINE AND REHAB | Facility: MEDICAL CENTER | Age: 50
End: 2025-08-19
Payer: COMMERCIAL

## 2025-08-19 VITALS
SYSTOLIC BLOOD PRESSURE: 120 MMHG | HEART RATE: 74 BPM | BODY MASS INDEX: 25.39 KG/M2 | TEMPERATURE: 97.6 F | DIASTOLIC BLOOD PRESSURE: 85 MMHG | HEIGHT: 63 IN | WEIGHT: 143.3 LBS | OXYGEN SATURATION: 100 %

## 2025-08-19 DIAGNOSIS — G89.29 CHRONIC BILATERAL LOW BACK PAIN WITHOUT SCIATICA: ICD-10-CM

## 2025-08-19 DIAGNOSIS — G89.29 CHRONIC GLUTEAL PAIN: ICD-10-CM

## 2025-08-19 DIAGNOSIS — M54.16 LEFT LUMBAR RADICULITIS: ICD-10-CM

## 2025-08-19 DIAGNOSIS — M47.819 FACET ARTHROPATHY: ICD-10-CM

## 2025-08-19 DIAGNOSIS — M53.3 SACROILIAC JOINT DYSFUNCTION OF RIGHT SIDE: ICD-10-CM

## 2025-08-19 DIAGNOSIS — M79.2 NEURALGIA: Primary | ICD-10-CM

## 2025-08-19 DIAGNOSIS — M48.061 LUMBAR STENOSIS WITHOUT NEUROGENIC CLAUDICATION: ICD-10-CM

## 2025-08-19 DIAGNOSIS — M54.50 CHRONIC BILATERAL LOW BACK PAIN WITHOUT SCIATICA: ICD-10-CM

## 2025-08-19 DIAGNOSIS — G89.29 OTHER CHRONIC PAIN: ICD-10-CM

## 2025-08-19 DIAGNOSIS — M54.6 CHRONIC LEFT-SIDED THORACIC BACK PAIN: ICD-10-CM

## 2025-08-19 DIAGNOSIS — M79.18 CHRONIC GLUTEAL PAIN: ICD-10-CM

## 2025-08-19 DIAGNOSIS — G89.29 CHRONIC LEFT-SIDED THORACIC BACK PAIN: ICD-10-CM

## 2025-08-19 DIAGNOSIS — M79.10 MYALGIA: ICD-10-CM

## 2025-08-19 DIAGNOSIS — M51.369 ANNULAR TEAR OF LUMBAR DISC: ICD-10-CM

## 2025-08-19 DIAGNOSIS — M47.816 LUMBAR SPONDYLOSIS: ICD-10-CM

## 2025-08-19 PROCEDURE — 99214 OFFICE O/P EST MOD 30 MIN: CPT | Performed by: STUDENT IN AN ORGANIZED HEALTH CARE EDUCATION/TRAINING PROGRAM

## 2025-08-19 PROCEDURE — 1125F AMNT PAIN NOTED PAIN PRSNT: CPT | Performed by: STUDENT IN AN ORGANIZED HEALTH CARE EDUCATION/TRAINING PROGRAM

## 2025-08-19 PROCEDURE — 3074F SYST BP LT 130 MM HG: CPT | Performed by: STUDENT IN AN ORGANIZED HEALTH CARE EDUCATION/TRAINING PROGRAM

## 2025-08-19 PROCEDURE — 3079F DIAST BP 80-89 MM HG: CPT | Performed by: STUDENT IN AN ORGANIZED HEALTH CARE EDUCATION/TRAINING PROGRAM

## 2025-08-19 ASSESSMENT — PATIENT HEALTH QUESTIONNAIRE - PHQ9
CLINICAL INTERPRETATION OF PHQ2 SCORE: 1
5. POOR APPETITE OR OVEREATING: 0 - NOT AT ALL
SUM OF ALL RESPONSES TO PHQ QUESTIONS 1-9: 2

## 2025-08-19 ASSESSMENT — PAIN SCALES - GENERAL: PAINLEVEL_OUTOF10: 5=MODERATE PAIN

## 2025-08-19 ASSESSMENT — FIBROSIS 4 INDEX: FIB4 SCORE: 0.94

## 2025-08-20 ENCOUNTER — HOSPITAL ENCOUNTER (OUTPATIENT)
Facility: REHABILITATION | Age: 50
End: 2025-08-20
Attending: STUDENT IN AN ORGANIZED HEALTH CARE EDUCATION/TRAINING PROGRAM | Admitting: STUDENT IN AN ORGANIZED HEALTH CARE EDUCATION/TRAINING PROGRAM
Payer: COMMERCIAL

## 2025-08-28 ENCOUNTER — OFFICE VISIT (OUTPATIENT)
Dept: URGENT CARE | Facility: CLINIC | Age: 50
End: 2025-08-28
Payer: COMMERCIAL

## 2025-08-28 VITALS
DIASTOLIC BLOOD PRESSURE: 70 MMHG | BODY MASS INDEX: 25.69 KG/M2 | OXYGEN SATURATION: 100 % | TEMPERATURE: 97.4 F | WEIGHT: 145 LBS | HEART RATE: 73 BPM | HEIGHT: 63 IN | RESPIRATION RATE: 16 BRPM | SYSTOLIC BLOOD PRESSURE: 112 MMHG

## 2025-08-28 DIAGNOSIS — H11.31 SUBCONJUNCTIVAL HEMORRHAGE OF RIGHT EYE: Primary | ICD-10-CM

## 2025-08-28 PROCEDURE — 99213 OFFICE O/P EST LOW 20 MIN: CPT | Performed by: FAMILY MEDICINE

## 2025-08-28 PROCEDURE — 3078F DIAST BP <80 MM HG: CPT | Performed by: FAMILY MEDICINE

## 2025-08-28 PROCEDURE — 3074F SYST BP LT 130 MM HG: CPT | Performed by: FAMILY MEDICINE

## 2025-08-28 ASSESSMENT — FIBROSIS 4 INDEX: FIB4 SCORE: 0.94

## (undated) DEVICE — SET LEADWIRE 5 LEAD BEDSIDE DISPOSABLE ECG (1SET OF 5/EA)

## (undated) DEVICE — COVER CIV-FLEX TRANSDUCER - (24/BX)

## (undated) DEVICE — SOD CHL INJ 20 CC - (25/BX 4BX/CS)

## (undated) DEVICE — CANISTER SUCTION 3000ML MECHANICAL FILTER AUTO SHUTOFF MEDI-VAC NONSTERILE LF DISP  (40EA/CA)

## (undated) DEVICE — SUTURE 2-0 PDS II CT-2 - (36/BX)

## (undated) DEVICE — MASK AIRWAY SIZE 4 UNIQUE SILICON (10EA/BX)

## (undated) DEVICE — GOWN WARMING STANDARD FLEX - (30/CA)

## (undated) DEVICE — CHLORAPREP 26 ML APPLICATOR - ORANGE TINT(25/CA)

## (undated) DEVICE — KIT  I.V. START (100EA/CA)

## (undated) DEVICE — GLOVE BIOGEL SZ 6 PF LATEX - (50EA/BX 4BX/CA)

## (undated) DEVICE — TUBE CONNECTING SUCTION - CLEAR PLASTIC STERILE 72 IN (50EA/CA)

## (undated) DEVICE — SHEET TRANSVERSE LAP - (12EA/CA)

## (undated) DEVICE — GLOVE BIOGEL PI INDICATOR SZ 7.5 SURGICAL PF LF -(50/BX 4BX/CA)

## (undated) DEVICE — BINDER BREAST FLORAL PINK LARGE 36-40 (1EA)"

## (undated) DEVICE — BLADE ELECTRODE COATED EDGE (50EA/PK)

## (undated) DEVICE — SENSOR OXIMETER ADULT SPO2 RD SET (20EA/BX)

## (undated) DEVICE — SUTURE GENERAL

## (undated) DEVICE — SODIUM CHL IRRIGATION 0.9% 1000ML (12EA/CA)

## (undated) DEVICE — CHLORAPREP 3 ML APPLICATOR - (25/BX 4BX/CS 100/CS)

## (undated) DEVICE — Device

## (undated) DEVICE — TUBING CLEARLINK DUO-VENT - C-FLO (48EA/CA)

## (undated) DEVICE — PAD MAGNETIC INSTRUMENT FOAM HOLDER (200/CA)

## (undated) DEVICE — SUCTION INSTRUMENT YANKAUER BULBOUS TIP W/O VENT (50EA/CA)

## (undated) DEVICE — GLOVE BIOGEL INDICATOR SZ 6.5 SURGICAL PF LTX - (50PR/BX 4BX/CA)

## (undated) DEVICE — SUTURE 3-0 VICRYL PLUS SH - 8X 18 INCH (12/BX)

## (undated) DEVICE — LACTATED RINGERS INJ 1000 ML - (14EA/CA 60CA/PF)

## (undated) DEVICE — CLOSURE SKIN STRIP 1/2 X 4 IN - (STERI STRIP) (50/BX 4BX/CA)

## (undated) DEVICE — SHEAR HS FOCUS 9CM CVD - (6/BX)

## (undated) DEVICE — CANISTER SUCTION RIGID RED 1500CC (40EA/CA)

## (undated) DEVICE — CANNULA O2 COMFORT SOFT EAR ADULT 7 FT TUBING (50/CA)

## (undated) DEVICE — PLUMEPEN ULTRA 3/8 IN X 10 FT HOSE (20EA/CA)

## (undated) DEVICE — FIBERWIRE 4-0 - (12/BX)

## (undated) DEVICE — MASK OXYGEN VNYL ADLT MED CONC WITH 7 FOOT TUBING  - (50EA/CA)

## (undated) DEVICE — SUTURE 4-0 30CM STRATAFIX SPIRAL PS-2 (12EA/BX)

## (undated) DEVICE — SUTURE 4-0 MONOCRYL PLUS PS-2 - 27 INCH (36/BX)

## (undated) DEVICE — TOWEL STOP TIMEOUT SAFETY FLAG (40EA/CA)

## (undated) DEVICE — SPONGE GAUZESTER. 2X2 4-PL - (2/PK 50PK/BX 30BX/CS)

## (undated) DEVICE — SLEEVE VASO CALF MED - (10PR/CA)